# Patient Record
Sex: MALE | Race: BLACK OR AFRICAN AMERICAN | NOT HISPANIC OR LATINO | URBAN - METROPOLITAN AREA
[De-identification: names, ages, dates, MRNs, and addresses within clinical notes are randomized per-mention and may not be internally consistent; named-entity substitution may affect disease eponyms.]

---

## 2023-08-26 ENCOUNTER — EMERGENCY (EMERGENCY)
Facility: HOSPITAL | Age: 73
LOS: 0 days | Discharge: HOME | End: 2023-08-27
Attending: STUDENT IN AN ORGANIZED HEALTH CARE EDUCATION/TRAINING PROGRAM
Payer: MEDICARE

## 2023-08-26 VITALS
WEIGHT: 179.9 LBS | TEMPERATURE: 98 F | HEIGHT: 66 IN | OXYGEN SATURATION: 98 % | DIASTOLIC BLOOD PRESSURE: 89 MMHG | HEART RATE: 84 BPM | RESPIRATION RATE: 18 BRPM | SYSTOLIC BLOOD PRESSURE: 158 MMHG

## 2023-08-26 DIAGNOSIS — H53.8 OTHER VISUAL DISTURBANCES: ICD-10-CM

## 2023-08-26 DIAGNOSIS — R39.198 OTHER DIFFICULTIES WITH MICTURITION: ICD-10-CM

## 2023-08-26 DIAGNOSIS — N39.0 URINARY TRACT INFECTION, SITE NOT SPECIFIED: ICD-10-CM

## 2023-08-26 DIAGNOSIS — R10.9 UNSPECIFIED ABDOMINAL PAIN: ICD-10-CM

## 2023-08-26 LAB
ALBUMIN SERPL ELPH-MCNC: 3.3 G/DL — SIGNIFICANT CHANGE UP (ref 3.3–5)
ALP SERPL-CCNC: 60 U/L — SIGNIFICANT CHANGE UP (ref 40–120)
ALT FLD-CCNC: 34 U/L — SIGNIFICANT CHANGE UP (ref 12–78)
ANION GAP SERPL CALC-SCNC: 6 MMOL/L — SIGNIFICANT CHANGE UP (ref 5–17)
APPEARANCE UR: ABNORMAL
AST SERPL-CCNC: 39 U/L — HIGH (ref 15–37)
BACTERIA # UR AUTO: ABNORMAL
BASOPHILS # BLD AUTO: 0.06 K/UL — SIGNIFICANT CHANGE UP (ref 0–0.2)
BASOPHILS NFR BLD AUTO: 0.8 % — SIGNIFICANT CHANGE UP (ref 0–2)
BILIRUB SERPL-MCNC: 0.4 MG/DL — SIGNIFICANT CHANGE UP (ref 0.2–1.2)
BILIRUB UR-MCNC: NEGATIVE — SIGNIFICANT CHANGE UP
BUN SERPL-MCNC: 38 MG/DL — HIGH (ref 7–23)
CALCIUM SERPL-MCNC: 8.6 MG/DL — SIGNIFICANT CHANGE UP (ref 8.5–10.1)
CHLORIDE SERPL-SCNC: 105 MMOL/L — SIGNIFICANT CHANGE UP (ref 96–108)
CO2 SERPL-SCNC: 26 MMOL/L — SIGNIFICANT CHANGE UP (ref 22–31)
COLOR SPEC: YELLOW — SIGNIFICANT CHANGE UP
CREAT SERPL-MCNC: 1.88 MG/DL — HIGH (ref 0.5–1.3)
DIFF PNL FLD: ABNORMAL
EGFR: 37 ML/MIN/1.73M2 — LOW
EOSINOPHIL # BLD AUTO: 0.14 K/UL — SIGNIFICANT CHANGE UP (ref 0–0.5)
EOSINOPHIL NFR BLD AUTO: 1.8 % — SIGNIFICANT CHANGE UP (ref 0–6)
GLUCOSE SERPL-MCNC: 126 MG/DL — HIGH (ref 70–99)
GLUCOSE UR QL: NEGATIVE MG/DL — SIGNIFICANT CHANGE UP
HCT VFR BLD CALC: 42 % — SIGNIFICANT CHANGE UP (ref 39–50)
HGB BLD-MCNC: 14.5 G/DL — SIGNIFICANT CHANGE UP (ref 13–17)
IMM GRANULOCYTES NFR BLD AUTO: 0.4 % — SIGNIFICANT CHANGE UP (ref 0–0.9)
KETONES UR-MCNC: NEGATIVE — SIGNIFICANT CHANGE UP
LEUKOCYTE ESTERASE UR-ACNC: ABNORMAL
LYMPHOCYTES # BLD AUTO: 2.96 K/UL — SIGNIFICANT CHANGE UP (ref 1–3.3)
LYMPHOCYTES # BLD AUTO: 37.2 % — SIGNIFICANT CHANGE UP (ref 13–44)
MCHC RBC-ENTMCNC: 28.9 PG — SIGNIFICANT CHANGE UP (ref 27–34)
MCHC RBC-ENTMCNC: 34.5 G/DL — SIGNIFICANT CHANGE UP (ref 32–36)
MCV RBC AUTO: 83.7 FL — SIGNIFICANT CHANGE UP (ref 80–100)
MONOCYTES # BLD AUTO: 0.78 K/UL — SIGNIFICANT CHANGE UP (ref 0–0.9)
MONOCYTES NFR BLD AUTO: 9.8 % — SIGNIFICANT CHANGE UP (ref 2–14)
NEUTROPHILS # BLD AUTO: 3.99 K/UL — SIGNIFICANT CHANGE UP (ref 1.8–7.4)
NEUTROPHILS NFR BLD AUTO: 50 % — SIGNIFICANT CHANGE UP (ref 43–77)
NITRITE UR-MCNC: NEGATIVE — SIGNIFICANT CHANGE UP
NRBC # BLD: 0 /100 WBCS — SIGNIFICANT CHANGE UP (ref 0–0)
PH UR: 6 — SIGNIFICANT CHANGE UP (ref 5–8)
PLATELET # BLD AUTO: 292 K/UL — SIGNIFICANT CHANGE UP (ref 150–400)
POTASSIUM SERPL-MCNC: 5 MMOL/L — SIGNIFICANT CHANGE UP (ref 3.5–5.3)
POTASSIUM SERPL-SCNC: 5 MMOL/L — SIGNIFICANT CHANGE UP (ref 3.5–5.3)
PROT SERPL-MCNC: 8 GM/DL — SIGNIFICANT CHANGE UP (ref 6–8.3)
PROT UR-MCNC: 500 MG/DL
RBC # BLD: 5.02 M/UL — SIGNIFICANT CHANGE UP (ref 4.2–5.8)
RBC # FLD: 14.9 % — HIGH (ref 10.3–14.5)
RBC CASTS # UR COMP ASSIST: ABNORMAL /HPF (ref 0–4)
SODIUM SERPL-SCNC: 137 MMOL/L — SIGNIFICANT CHANGE UP (ref 135–145)
SP GR SPEC: 1.01 — SIGNIFICANT CHANGE UP (ref 1.01–1.02)
UROBILINOGEN FLD QL: NEGATIVE MG/DL — SIGNIFICANT CHANGE UP
WBC # BLD: 7.96 K/UL — SIGNIFICANT CHANGE UP (ref 3.8–10.5)
WBC # FLD AUTO: 7.96 K/UL — SIGNIFICANT CHANGE UP (ref 3.8–10.5)
WBC UR QL: ABNORMAL

## 2023-08-26 PROCEDURE — 99285 EMERGENCY DEPT VISIT HI MDM: CPT

## 2023-08-26 PROCEDURE — 99283 EMERGENCY DEPT VISIT LOW MDM: CPT | Mod: FS

## 2023-08-26 PROCEDURE — 74176 CT ABD & PELVIS W/O CONTRAST: CPT | Mod: 26,MA

## 2023-08-26 NOTE — ED ADULT NURSE NOTE - OBJECTIVE STATEMENT
Pt A&Ox4 presents to ED c/o L flank pain urinary frequency and dribbling x 3 months. As per pt he wants to have his prostate checked due to urinary symptoms. Pt denies hematuria, CP, SOB, numbness, tingling, N/V/D, f/c. PMH HTN. NKA.

## 2023-08-26 NOTE — ED PROVIDER NOTE - PROGRESS NOTE DETAILS
Shailesh: Labs and imaging reviewed with patient and family at bedside.  Patient wants to go home.  No recent bladder instrumentation.  Discussed with urology regarding air in bladder, not in bladder wall, patient very well appearing, would recommend abx and follow up outpatient monday in clinic.  Discussed this with patient and answered questions.  Will dc.  [id number]

## 2023-08-26 NOTE — ED PROVIDER NOTE - PATIENT PORTAL LINK FT
You can access the FollowMyHealth Patient Portal offered by [facility] by registering at the following website: [email or web address]. By joining [employer]’s FollowMyHealth portal, you will also be able to view your health information using other applications (apps) compatible with our system.

## 2023-08-26 NOTE — ED PROVIDER NOTE - PHYSICAL EXAMINATION
General appearance: Nontoxic appearing, conversant, afebrile    Eyes: anicteric sclerae, ELISA, EOMI   HENT: Atraumatic; oropharynx clear, MMM and no ulcerations, no pharyngeal erythema or exudate   Neck: Trachea midline; Full range of motion, supple   Pulm: CTA bl, normal respiratory effort and no intercostal retractions, normal work of breathing   CV: RRR, No murmurs, rubs, or gallops   Abdomen: Soft, non-tender, non-distended; no guarding or rebound   Back: No midline ttp, step offs, deformities, no cvat    Extremities: No peripheral edema, no gross deformities, FROM x4   Skin: Dry, normal temperature, turgor and texture; no rash   Psych: Appropriate affect, cooperative

## 2023-08-26 NOTE — ED PROVIDER NOTE - CARE PROVIDER_API CALL
Tom Patterson  Urology  [address], [address]  [place] [address]  Phone: [phone]  Fax: [phone]  Follow Up Time:

## 2023-08-26 NOTE — ED ADULT TRIAGE NOTE - CHIEF COMPLAINT QUOTE
pt presents to the ED c/o Left flank pain w/ dysuria and urinary frequency and dribbling x 3 months. pt states also wants to check prostate due to urinary symptoms

## 2023-08-26 NOTE — ED PROVIDER NOTE - TEMPLATE, MLM
Answers submitted by the patient for this visit:  Review of Systems Questionnaire (Submitted on 5/29/2023)  activity change: No  unexpected weight change: No  neck pain: Yes  hearing loss: No  rhinorrhea: No  trouble swallowing: No  eye discharge: No  visual disturbance: No  chest tightness: No  wheezing: No  chest pain: No  palpitations: No  blood in stool: No  constipation: No  vomiting: No  diarrhea: No  polydipsia: No  polyuria: No  difficulty urinating: No  hematuria: No  menstrual problem: Yes  dysuria: No  joint swelling: No  arthralgias: No  headaches: Yes  weakness: No  confusion: No  dysphoric mood: Yes     General

## 2023-08-26 NOTE — ED PROVIDER NOTE - CLINICAL SUMMARY MEDICAL DECISION MAKING FREE TEXT BOX
74yo male with no pmh presenting with L flank pain and difficulty urinating.  Progressive over months, recently more difficulty to urinate with pain so came to ED.  Concerned about prostate issues.  No fevers.  Has suprapubic pain with urination.  Has not taken anything for pain.  No pshx.  Also endorsing progressive blurry vision over months, no sudden change.  Wears glasses, no visual loss.  Will eval for retention, stone, uti.  Doubt gi, diverticulitis, colitis.  Vision does not seem acute.  Labs, urine, ct, anticipate dc with uro and ophthalmology follow up.

## 2023-08-26 NOTE — ED ADULT NURSE NOTE - NSFALLUNIVINTERV_ED_ALL_ED
Bed/Stretcher in lowest position, wheels locked, appropriate side rails in place/Call bell, personal items and telephone in reach/Instruct patient to call for assistance before getting out of bed/chair/stretcher/Non-slip footwear applied when patient is off stretcher/Cope to call system/Physically safe environment - no spills, clutter or unnecessary equipment/Purposeful proactive rounding/Room/bathroom lighting operational, light cord in reach

## 2023-08-27 VITALS
DIASTOLIC BLOOD PRESSURE: 90 MMHG | HEART RATE: 82 BPM | RESPIRATION RATE: 17 BRPM | TEMPERATURE: 98 F | OXYGEN SATURATION: 98 % | SYSTOLIC BLOOD PRESSURE: 160 MMHG

## 2023-08-27 RX ORDER — CEFPODOXIME PROXETIL 100 MG
1 TABLET ORAL
Qty: 20 | Refills: 0
Start: 2023-08-27 | End: 2023-09-05

## 2023-08-27 RX ORDER — CEFPODOXIME PROXETIL 100 MG
200 TABLET ORAL ONCE
Refills: 0 | Status: COMPLETED | OUTPATIENT
Start: 2023-08-27 | End: 2023-08-27

## 2023-08-27 RX ADMIN — Medication 200 MILLIGRAM(S): at 00:43

## 2023-08-27 NOTE — CONSULT NOTE ADULT - SUBJECTIVE AND OBJECTIVE BOX
Chief Complaint:  Patient is a 73y old  Male who presents with a chief complaint of urinary retention    HPI: Pt denies pertinent pmh is here for worsening urinary retention over the last 2 months as well as flank pain.  Denies any hematuria or dysuria.   Denies fevers, chills      PMH/PSH:PAST MEDICAL & SURGICAL HISTORY:      Allergies:  No Known Allergies      Medications:      REVIEW OF SYSTEMS:  All other review of systems is negative unless indicated above.    Relevant Family History:   FAMILY HISTORY:      Relevant Social History:  Denies ETOH or tobacco history    Physical Exam:    Vital Signs:  Vital Signs Last 24 Hrs  T(C): 36.4 (27 Aug 2023 01:55), Max: 36.9 (26 Aug 2023 18:43)  T(F): 97.5 (27 Aug 2023 01:55), Max: 98.5 (26 Aug 2023 18:43)  HR: 82 (27 Aug 2023 01:55) (82 - 84)  BP: 160/90 (27 Aug 2023 01:55) (158/89 - 172/100)  BP(mean): --  RR: 17 (27 Aug 2023 01:55) (17 - 18)  SpO2: 98% (27 Aug 2023 01:55) (98% - 99%)    Parameters below as of 27 Aug 2023 01:55  Patient On (Oxygen Delivery Method): room air      Daily Height in cm: 167.64 (26 Aug 2023 18:43)    Daily     Constitutional: WDWN resting comfortably in bed; NAD  Respiratory: Normal breathing  Cardiac: RRR  Gastrointestinal: soft, NT/ND; no rebound or guarding;   : mild suprapubic TTP  Extremities: , no clubbing or cyanosis; no peripheral edema  Musculoskeletal:  no joint swelling, tenderness or erythema  Skin: warm, dry and intact;  Neurologic: AAOx3;          Labratory                    14.5   7.96  )-----------( 292      ( 26 Aug 2023 21:12 )             42.0     08-    137  |  105  |  38<H>  ----------------------------<  126<H>  5.0   |  26  |  1.88<H>    Ca    8.6      26 Aug 2023 21:12    TPro  8.0  /  Alb  3.3  /  TBili  0.4  /  DBili  x   /  AST  39<H>  /  ALT  34  /  AlkPhos  60      LIVER FUNCTIONS - ( 26 Aug 2023 21:12 )  Alb: 3.3 g/dL / Pro: 8.0 gm/dL / ALK PHOS: 60 U/L / ALT: 34 U/L / AST: 39 U/L / GGT: x             Urinalysis Basic - ( 26 Aug 2023 21:12 )    Color: Yellow / Appearance: Slightly Turbid / S.010 / pH: x  Gluc: 126 mg/dL / Ketone: Negative  / Bili: Negative / Urobili: Negative mg/dL   Blood: x / Protein: 500 mg/dL / Nitrite: Negative   Leuk Esterase: Moderate / RBC: 3-5 /HPF / WBC 26-50   Sq Epi: x / Non Sq Epi: x / Bacteria: Many          Intake and Output      Imaging:      < from: CT Abdomen and Pelvis No Cont (23 @ 23:18) >    INTERPRETATION:  CLINICAL INFORMATION: Left flank pain. Difficulty   urinating.    COMPARISON: None.    CONTRAST/COMPLICATIONS:  IV Contrast: None  Oral Contrast: None  Complications: None    PROCEDURE:  CT of the Abdomen and Pelvis was performed.  Sagittal and coronal reformats were performed.    FINDINGS:  LOWER CHEST: Right gynecomastia.    LIVER: Within normal limits.  BILE DUCTS:Normal caliber.  GALLBLADDER: Contracted.  SPLEEN: Within normal limits.  PANCREAS: Within normal limits.  ADRENALS: Within normal limits.  KIDNEYS/URETERS: No hydronephrosis. Symmetric-appearing nonspecific   perinephric stranding. Staghorn type calculus lower pole left kidney with   additional nonobstructing left intrarenal calculi. No ureteral calculi.    BLADDER: Mildly distended and slightly thick-walled. Mild perivesicular   fat stranding suggested. Air within the bladder.  REPRODUCTIVE ORGANS: Enlarged prostate measuring up to 6 cm.    BOWEL: No bowel obstruction. Appendix is normal. Extensive colonic   diverticulosis. No evidence of diverticulitis.  PERITONEUM: No ascites. No free air or intraperitoneal collection.  VESSELS: Within normal limits.  RETROPERITONEUM/LYMPH NODES: No lymphadenopathy.  ABDOMINAL WALL: Tiny fat-containing umbilical hernia.  BONES: Mild degenerative changes.    IMPRESSION:  Air within the bladder; please correlate clinically for recent   instrumentation. Mild wall thickening and perivesicular fat stranding,   infectious process not excluded. No hydronephrosis. Nonobstructing left   intrarenal calculi. Please correlate clinically with urinalysis and urine   culture.  Enlarged prostate.    Diverticulosis without evidence of diverticulitis.    --- End of Report ---        < end of copied text >

## 2023-08-27 NOTE — CONSULT NOTE ADULT - ASSESSMENT
Pt is here with progressively worsening urinary retention.  UA shows UTI.  CT shows non obstructing intrarenal calculi, air in bladder.  Mild tenderness suprapubic exam.  VS wnl    recommend abx for UTI  pt should f/u outpatient with Dr Patterson this week  discussed with Dr Patterson

## 2023-08-29 ENCOUNTER — EMERGENCY (EMERGENCY)
Facility: HOSPITAL | Age: 73
LOS: 0 days | Discharge: HOME | End: 2023-08-30
Attending: EMERGENCY MEDICINE
Payer: MEDICARE

## 2023-08-29 VITALS
RESPIRATION RATE: 19 BRPM | SYSTOLIC BLOOD PRESSURE: 171 MMHG | HEIGHT: 66 IN | HEART RATE: 88 BPM | TEMPERATURE: 98 F | WEIGHT: 214.95 LBS | OXYGEN SATURATION: 98 % | DIASTOLIC BLOOD PRESSURE: 112 MMHG

## 2023-08-29 DIAGNOSIS — R79.9 ABNORMAL FINDING OF BLOOD CHEMISTRY, UNSPECIFIED: ICD-10-CM

## 2023-08-29 DIAGNOSIS — I10 ESSENTIAL (PRIMARY) HYPERTENSION: ICD-10-CM

## 2023-08-29 DIAGNOSIS — N39.0 URINARY TRACT INFECTION, SITE NOT SPECIFIED: ICD-10-CM

## 2023-08-29 LAB
-  AMIKACIN: SIGNIFICANT CHANGE UP
-  AMIKACIN: SIGNIFICANT CHANGE UP
-  AMOXICILLIN/CLAVULANIC ACID: SIGNIFICANT CHANGE UP
-  AMOXICILLIN/CLAVULANIC ACID: SIGNIFICANT CHANGE UP
-  AMPICILLIN/SULBACTAM: SIGNIFICANT CHANGE UP
-  AMPICILLIN/SULBACTAM: SIGNIFICANT CHANGE UP
-  AMPICILLIN: SIGNIFICANT CHANGE UP
-  AMPICILLIN: SIGNIFICANT CHANGE UP
-  AZTREONAM: SIGNIFICANT CHANGE UP
-  AZTREONAM: SIGNIFICANT CHANGE UP
-  CEFAZOLIN: SIGNIFICANT CHANGE UP
-  CEFAZOLIN: SIGNIFICANT CHANGE UP
-  CEFEPIME: SIGNIFICANT CHANGE UP
-  CEFEPIME: SIGNIFICANT CHANGE UP
-  CEFOXITIN: SIGNIFICANT CHANGE UP
-  CEFTRIAXONE: SIGNIFICANT CHANGE UP
-  CEFTRIAXONE: SIGNIFICANT CHANGE UP
-  CEFUROXIME: SIGNIFICANT CHANGE UP
-  CIPROFLOXACIN: SIGNIFICANT CHANGE UP
-  CIPROFLOXACIN: SIGNIFICANT CHANGE UP
-  ERTAPENEM: SIGNIFICANT CHANGE UP
-  ERTAPENEM: SIGNIFICANT CHANGE UP
-  GENTAMICIN: SIGNIFICANT CHANGE UP
-  GENTAMICIN: SIGNIFICANT CHANGE UP
-  IMIPENEM: SIGNIFICANT CHANGE UP
-  IMIPENEM: SIGNIFICANT CHANGE UP
-  LEVOFLOXACIN: SIGNIFICANT CHANGE UP
-  LEVOFLOXACIN: SIGNIFICANT CHANGE UP
-  MEROPENEM: SIGNIFICANT CHANGE UP
-  MEROPENEM: SIGNIFICANT CHANGE UP
-  NITROFURANTOIN: SIGNIFICANT CHANGE UP
-  NITROFURANTOIN: SIGNIFICANT CHANGE UP
-  PIPERACILLIN/TAZOBACTAM: SIGNIFICANT CHANGE UP
-  PIPERACILLIN/TAZOBACTAM: SIGNIFICANT CHANGE UP
-  TOBRAMYCIN: SIGNIFICANT CHANGE UP
-  TOBRAMYCIN: SIGNIFICANT CHANGE UP
-  TRIMETHOPRIM/SULFAMETHOXAZOLE: SIGNIFICANT CHANGE UP
-  TRIMETHOPRIM/SULFAMETHOXAZOLE: SIGNIFICANT CHANGE UP
CULTURE RESULTS: SIGNIFICANT CHANGE UP
METHOD TYPE: SIGNIFICANT CHANGE UP
METHOD TYPE: SIGNIFICANT CHANGE UP
ORGANISM # SPEC MICROSCOPIC CNT: SIGNIFICANT CHANGE UP
SPECIMEN SOURCE: SIGNIFICANT CHANGE UP

## 2023-08-29 PROCEDURE — 99283 EMERGENCY DEPT VISIT LOW MDM: CPT

## 2023-08-29 NOTE — ED ADULT TRIAGE NOTE - CHIEF COMPLAINT QUOTE
Called back for "something in urine"  seen at [facility] for urinary retention 8/26

## 2023-08-30 VITALS
RESPIRATION RATE: 18 BRPM | SYSTOLIC BLOOD PRESSURE: 183 MMHG | DIASTOLIC BLOOD PRESSURE: 124 MMHG | HEART RATE: 81 BPM | OXYGEN SATURATION: 98 % | TEMPERATURE: 98 F

## 2023-08-30 RX ADMIN — Medication 1 TABLET(S): at 02:45

## 2023-08-30 NOTE — ED ADULT NURSE REASSESSMENT NOTE - NS ED NURSE REASSESS COMMENT FT1
no s/s of distress noted in WR. Vitals completed. Explained wait time and ER triage. Verbalized understanding. Awaiting to be seen. no acute distress noted. Respirations even and unlabored. pt ambulatory with cane. pt accompanied by family. no s/s of distress noted and no changes in symptoms reported by pt  in WR. Vitals completed. Explained wait time and ER triage. Verbalized understanding. Awaiting to be seen. pt ambulatory with steady gait with use of cane. no acute distress noted. respirations even and unlabored.   pt accompanied by family.

## 2023-08-30 NOTE — ED PROVIDER NOTE - OBJECTIVE STATEMENT
72 yo M presents to ER after callback for positive urine culture.  Pt. denies complaints at this time.  States "something is wrong with the urine."   ROS: negative for fever, cough, headache, chest pain, shortness of breath, abd pain, nausea, vomiting, diarrhea, rash, paresthesia, and weakness--all other systems reviewed are negative.   PMH: negative; Meds: Denies; SH: Denies smoking/drinking/drug use

## 2023-08-30 NOTE — ED ADULT NURSE NOTE - OBJECTIVE STATEMENT
pt seen @ [facility] ED for urinary retention 8/26 and Called back today for "something in his urine." PM UTI

## 2023-08-30 NOTE — ED PROVIDER NOTE - PHYSICAL EXAMINATION
Vitals: HTN at 145/89, otherwise WNL  Gen: AAOx3, NAD, sitting comfortably in stretcher  Head: ncat, perrla, eomi b/l  Neck: supple, no lymphadenopathy, no midline deviation  Heart: rrr, no m/r/g  Lungs: CTA b/l, no rales/ronchi/wheezes  Abd: soft, nontender, non-distended, no rebound or guarding  Ext: no clubbing/cyanosis/edema  Neuro: sensation and muscle strength intact b/l, steady gait

## 2023-08-30 NOTE — ED ADULT NURSE NOTE - NURSING ED SKIN COLOR
Visit Information    Have you changed or started any medications since your last visit including any over-the-counter medicines, vitamins, or herbal medicines? no   Are you having any side effects from any of your medications? -  no  Have you stopped taking any of your medications? Is so, why? -  no    Have you seen any other physician or provider since your last visit? No  Have you had any other diagnostic tests since your last visit? No  Have you been seen in the emergency room and/or had an admission to a hospital since we last saw you? No  Have you had your routine dental cleaning in the past 6 months? no    Have you activated your [employer] account? If not, what are your barriers?  No:      Patient Care Team:  Ever Marina MD as PCP - General (Family Medicine)  Ever Marina MD as [facility] - [facility]  Marixa Bill MD as Consulting Physician (Internal Medicine)    Medical History Review  Past Medical, Family, and Social History reviewed and does contribute to the patient presenting condition    Health Maintenance   Topic Date Due    Hepatitis C screen  1958    HIV screen  05/01/1972    Shingles Vaccine (1 of 2) 05/01/2007    Low dose CT lung screening  05/01/2012    Cervical cancer screen  04/25/2017    Potassium monitoring  11/18/2017    Creatinine monitoring  04/11/2018    Flu vaccine (1) 09/01/2019    Colon Cancer Screen FIT/FOBT  12/04/2019    Lipid screen  01/30/2020    A1C test (Diabetic or Prediabetic)  06/12/2020    Breast cancer screen  12/04/2020    DTaP/Tdap/Td vaccine (2 - Td) 06/12/2029    Pneumococcal 0-64 years Vaccine  Completed     Low Dose CT (LDCT) Lung Screening criteria met   Age 50-69   Pack year smoking >30   Still smoking or less than 15 year since quit   No sign or symptoms of lung cancer   > 11 months since last LDCT     Risks and benefits of lung cancer screening with LDCT scans discussed:    Significance of positive screen - False-positive LDCT results normal for race

## 2023-08-30 NOTE — ED PROVIDER NOTE - CARE PROVIDER_API CALL
Tom Patterson  Urology  [address]  [place] [address]  Phone: [phone]  Fax: [phone]  Follow Up Time: Urgent

## 2023-08-30 NOTE — ED PROVIDER NOTE - PATIENT PORTAL LINK FT
You can access the FollowMyHealth Patient Portal offered by [facility] by registering at the following website: [email or web address]. By joining [employer]’s FollowMyHealth portal, you will also be able to view your health information using other applications (apps) compatible with our system.

## 2023-08-30 NOTE — ED ADULT NURSE NOTE - NSFALLHARMRISKINTERV_ED_ALL_ED

## 2023-09-05 ENCOUNTER — INPATIENT (INPATIENT)
Facility: HOSPITAL | Age: 73
LOS: 5 days | Discharge: HOME | End: 2023-09-11
Attending: INTERNAL MEDICINE | Admitting: INTERNAL MEDICINE
Payer: MEDICARE

## 2023-09-05 VITALS
HEIGHT: 66 IN | DIASTOLIC BLOOD PRESSURE: 102 MMHG | WEIGHT: 240.08 LBS | OXYGEN SATURATION: 97 % | HEART RATE: 85 BPM | TEMPERATURE: 98 F | SYSTOLIC BLOOD PRESSURE: 155 MMHG | RESPIRATION RATE: 18 BRPM

## 2023-09-05 DIAGNOSIS — N39.0 URINARY TRACT INFECTION, SITE NOT SPECIFIED: ICD-10-CM

## 2023-09-05 DIAGNOSIS — I12.9 HYPERTENSIVE CHRONIC KIDNEY DISEASE WITH STAGE 1 THROUGH STAGE 4 CHRONIC KIDNEY DISEASE, OR UNSPECIFIED CHRONIC KIDNEY DISEASE: ICD-10-CM

## 2023-09-05 DIAGNOSIS — B96.20 UNSPECIFIED ESCHERICHIA COLI [E. COLI] AS THE CAUSE OF DISEASES CLASSIFIED ELSEWHERE: ICD-10-CM

## 2023-09-05 DIAGNOSIS — N18.32 CHRONIC KIDNEY DISEASE, STAGE 3B: ICD-10-CM

## 2023-09-05 LAB
ALBUMIN SERPL ELPH-MCNC: 3.1 G/DL — LOW (ref 3.3–5)
ALP SERPL-CCNC: 48 U/L — SIGNIFICANT CHANGE UP (ref 40–120)
ALT FLD-CCNC: 23 U/L — SIGNIFICANT CHANGE UP (ref 12–78)
ANION GAP SERPL CALC-SCNC: 5 MMOL/L — SIGNIFICANT CHANGE UP (ref 5–17)
APPEARANCE UR: CLEAR — SIGNIFICANT CHANGE UP
AST SERPL-CCNC: 16 U/L — SIGNIFICANT CHANGE UP (ref 15–37)
BACTERIA # UR AUTO: ABNORMAL
BASOPHILS # BLD AUTO: 0.05 K/UL — SIGNIFICANT CHANGE UP (ref 0–0.2)
BASOPHILS NFR BLD AUTO: 0.5 % — SIGNIFICANT CHANGE UP (ref 0–2)
BILIRUB SERPL-MCNC: 0.5 MG/DL — SIGNIFICANT CHANGE UP (ref 0.2–1.2)
BILIRUB UR-MCNC: NEGATIVE — SIGNIFICANT CHANGE UP
BUN SERPL-MCNC: 28 MG/DL — HIGH (ref 7–23)
CALCIUM SERPL-MCNC: 8.9 MG/DL — SIGNIFICANT CHANGE UP (ref 8.5–10.1)
CHLORIDE SERPL-SCNC: 108 MMOL/L — SIGNIFICANT CHANGE UP (ref 96–108)
CO2 SERPL-SCNC: 20 MMOL/L — LOW (ref 22–31)
COLOR SPEC: YELLOW — SIGNIFICANT CHANGE UP
CREAT SERPL-MCNC: 2.41 MG/DL — HIGH (ref 0.5–1.3)
DIFF PNL FLD: ABNORMAL
EGFR: 27 ML/MIN/1.73M2 — LOW
EOSINOPHIL # BLD AUTO: 0.04 K/UL — SIGNIFICANT CHANGE UP (ref 0–0.5)
EOSINOPHIL NFR BLD AUTO: 0.4 % — SIGNIFICANT CHANGE UP (ref 0–6)
EPI CELLS # UR: SIGNIFICANT CHANGE UP
GLUCOSE SERPL-MCNC: 132 MG/DL — HIGH (ref 70–99)
GLUCOSE UR QL: NEGATIVE MG/DL — SIGNIFICANT CHANGE UP
HCT VFR BLD CALC: 41.6 % — SIGNIFICANT CHANGE UP (ref 39–50)
HGB BLD-MCNC: 14 G/DL — SIGNIFICANT CHANGE UP (ref 13–17)
IMM GRANULOCYTES NFR BLD AUTO: 0.3 % — SIGNIFICANT CHANGE UP (ref 0–0.9)
KETONES UR-MCNC: NEGATIVE — SIGNIFICANT CHANGE UP
LEUKOCYTE ESTERASE UR-ACNC: ABNORMAL
LYMPHOCYTES # BLD AUTO: 19.2 % — SIGNIFICANT CHANGE UP (ref 13–44)
LYMPHOCYTES # BLD AUTO: 2.12 K/UL — SIGNIFICANT CHANGE UP (ref 1–3.3)
MCHC RBC-ENTMCNC: 28.3 PG — SIGNIFICANT CHANGE UP (ref 27–34)
MCHC RBC-ENTMCNC: 33.7 G/DL — SIGNIFICANT CHANGE UP (ref 32–36)
MCV RBC AUTO: 84 FL — SIGNIFICANT CHANGE UP (ref 80–100)
MONOCYTES # BLD AUTO: 1.22 K/UL — HIGH (ref 0–0.9)
MONOCYTES NFR BLD AUTO: 11.1 % — SIGNIFICANT CHANGE UP (ref 2–14)
NEUTROPHILS # BLD AUTO: 7.57 K/UL — HIGH (ref 1.8–7.4)
NEUTROPHILS NFR BLD AUTO: 68.5 % — SIGNIFICANT CHANGE UP (ref 43–77)
NITRITE UR-MCNC: NEGATIVE — SIGNIFICANT CHANGE UP
NRBC # BLD: 0 /100 WBCS — SIGNIFICANT CHANGE UP (ref 0–0)
PH UR: 6 — SIGNIFICANT CHANGE UP (ref 5–8)
PLATELET # BLD AUTO: 265 K/UL — SIGNIFICANT CHANGE UP (ref 150–400)
POTASSIUM SERPL-MCNC: 4.5 MMOL/L — SIGNIFICANT CHANGE UP (ref 3.5–5.3)
POTASSIUM SERPL-SCNC: 4.5 MMOL/L — SIGNIFICANT CHANGE UP (ref 3.5–5.3)
PROT SERPL-MCNC: 8 GM/DL — SIGNIFICANT CHANGE UP (ref 6–8.3)
PROT UR-MCNC: 500 MG/DL
RBC # BLD: 4.95 M/UL — SIGNIFICANT CHANGE UP (ref 4.2–5.8)
RBC # FLD: 14.6 % — HIGH (ref 10.3–14.5)
RBC CASTS # UR COMP ASSIST: ABNORMAL /HPF (ref 0–4)
SODIUM SERPL-SCNC: 133 MMOL/L — LOW (ref 135–145)
SP GR SPEC: 1.01 — SIGNIFICANT CHANGE UP (ref 1.01–1.02)
TROPONIN I, HIGH SENSITIVITY RESULT: 11.3 NG/L — SIGNIFICANT CHANGE UP
UROBILINOGEN FLD QL: NEGATIVE MG/DL — SIGNIFICANT CHANGE UP
WBC # BLD: 11.03 K/UL — HIGH (ref 3.8–10.5)
WBC # FLD AUTO: 11.03 K/UL — HIGH (ref 3.8–10.5)
WBC UR QL: ABNORMAL

## 2023-09-05 PROCEDURE — 99285 EMERGENCY DEPT VISIT HI MDM: CPT

## 2023-09-05 PROCEDURE — G1004: CPT

## 2023-09-05 PROCEDURE — 99222 1ST HOSP IP/OBS MODERATE 55: CPT

## 2023-09-05 PROCEDURE — 99223 1ST HOSP IP/OBS HIGH 75: CPT

## 2023-09-05 PROCEDURE — 74176 CT ABD & PELVIS W/O CONTRAST: CPT | Mod: 26,ME

## 2023-09-05 PROCEDURE — 71045 X-RAY EXAM CHEST 1 VIEW: CPT | Mod: 26

## 2023-09-05 PROCEDURE — 99232 SBSQ HOSP IP/OBS MODERATE 35: CPT

## 2023-09-05 RX ORDER — HYDRALAZINE HCL 50 MG
5 TABLET ORAL ONCE
Refills: 0 | Status: COMPLETED | OUTPATIENT
Start: 2023-09-05 | End: 2023-09-05

## 2023-09-05 RX ORDER — MORPHINE SULFATE 50 MG/1
2 CAPSULE, EXTENDED RELEASE ORAL EVERY 4 HOURS
Refills: 0 | Status: DISCONTINUED | OUTPATIENT
Start: 2023-09-05 | End: 2023-09-08

## 2023-09-05 RX ORDER — PIPERACILLIN AND TAZOBACTAM 4; .5 G/20ML; G/20ML
3.38 INJECTION, POWDER, LYOPHILIZED, FOR SOLUTION INTRAVENOUS ONCE
Refills: 0 | Status: COMPLETED | OUTPATIENT
Start: 2023-09-05 | End: 2023-09-05

## 2023-09-05 RX ORDER — HEPARIN SODIUM 5000 [USP'U]/ML
5000 INJECTION INTRAVENOUS; SUBCUTANEOUS EVERY 8 HOURS
Refills: 0 | Status: DISCONTINUED | OUTPATIENT
Start: 2023-09-05 | End: 2023-09-06

## 2023-09-05 RX ORDER — ERTAPENEM SODIUM 1 G/1
500 INJECTION, POWDER, LYOPHILIZED, FOR SOLUTION INTRAMUSCULAR; INTRAVENOUS EVERY 24 HOURS
Refills: 0 | Status: DISCONTINUED | OUTPATIENT
Start: 2023-09-06 | End: 2023-09-06

## 2023-09-05 RX ORDER — SODIUM CHLORIDE 9 MG/ML
1000 INJECTION, SOLUTION INTRAVENOUS
Refills: 0 | Status: DISCONTINUED | OUTPATIENT
Start: 2023-09-05 | End: 2023-09-07

## 2023-09-05 RX ORDER — LANOLIN ALCOHOL/MO/W.PET/CERES
3 CREAM (GRAM) TOPICAL AT BEDTIME
Refills: 0 | Status: DISCONTINUED | OUTPATIENT
Start: 2023-09-05 | End: 2023-09-08

## 2023-09-05 RX ORDER — ONDANSETRON 8 MG/1
4 TABLET, FILM COATED ORAL EVERY 8 HOURS
Refills: 0 | Status: DISCONTINUED | OUTPATIENT
Start: 2023-09-05 | End: 2023-09-08

## 2023-09-05 RX ORDER — ERTAPENEM SODIUM 1 G/1
INJECTION, POWDER, LYOPHILIZED, FOR SOLUTION INTRAMUSCULAR; INTRAVENOUS
Refills: 0 | Status: DISCONTINUED | OUTPATIENT
Start: 2023-09-05 | End: 2023-09-06

## 2023-09-05 RX ORDER — ERTAPENEM SODIUM 1 G/1
500 INJECTION, POWDER, LYOPHILIZED, FOR SOLUTION INTRAMUSCULAR; INTRAVENOUS ONCE
Refills: 0 | Status: COMPLETED | OUTPATIENT
Start: 2023-09-05 | End: 2023-09-05

## 2023-09-05 RX ORDER — SODIUM CHLORIDE 9 MG/ML
1000 INJECTION INTRAMUSCULAR; INTRAVENOUS; SUBCUTANEOUS ONCE
Refills: 0 | Status: COMPLETED | OUTPATIENT
Start: 2023-09-05 | End: 2023-09-05

## 2023-09-05 RX ORDER — PIPERACILLIN AND TAZOBACTAM 4; .5 G/20ML; G/20ML
3.38 INJECTION, POWDER, LYOPHILIZED, FOR SOLUTION INTRAVENOUS EVERY 8 HOURS
Refills: 0 | Status: DISCONTINUED | OUTPATIENT
Start: 2023-09-05 | End: 2023-09-05

## 2023-09-05 RX ORDER — PIPERACILLIN AND TAZOBACTAM 4; .5 G/20ML; G/20ML
1500 INJECTION, POWDER, LYOPHILIZED, FOR SOLUTION INTRAVENOUS EVERY 8 HOURS
Refills: 0 | Status: DISCONTINUED | OUTPATIENT
Start: 2023-09-05 | End: 2023-09-05

## 2023-09-05 RX ORDER — INFLUENZA VIRUS VACCINE 15; 15; 15; 15 UG/.5ML; UG/.5ML; UG/.5ML; UG/.5ML
0.7 SUSPENSION INTRAMUSCULAR ONCE
Refills: 0 | Status: DISCONTINUED | OUTPATIENT
Start: 2023-09-05 | End: 2023-09-05

## 2023-09-05 RX ORDER — KETOROLAC TROMETHAMINE 30 MG/ML
30 SYRINGE (ML) INJECTION ONCE
Refills: 0 | Status: DISCONTINUED | OUTPATIENT
Start: 2023-09-05 | End: 2023-09-05

## 2023-09-05 RX ORDER — ACETAMINOPHEN 500 MG
650 TABLET ORAL EVERY 6 HOURS
Refills: 0 | Status: DISCONTINUED | OUTPATIENT
Start: 2023-09-05 | End: 2023-09-08

## 2023-09-05 RX ADMIN — MORPHINE SULFATE 2 MILLIGRAM(S): 50 CAPSULE, EXTENDED RELEASE ORAL at 05:07

## 2023-09-05 RX ADMIN — SODIUM CHLORIDE 125 MILLILITER(S): 9 INJECTION, SOLUTION INTRAVENOUS at 21:49

## 2023-09-05 RX ADMIN — HEPARIN SODIUM 5000 UNIT(S): 5000 INJECTION INTRAVENOUS; SUBCUTANEOUS at 04:44

## 2023-09-05 RX ADMIN — SODIUM CHLORIDE 125 MILLILITER(S): 9 INJECTION, SOLUTION INTRAVENOUS at 04:44

## 2023-09-05 RX ADMIN — Medication 30 MILLIGRAM(S): at 02:02

## 2023-09-05 RX ADMIN — SODIUM CHLORIDE 125 MILLILITER(S): 9 INJECTION, SOLUTION INTRAVENOUS at 17:28

## 2023-09-05 RX ADMIN — ERTAPENEM SODIUM 500 MILLIGRAM(S): 1 INJECTION, POWDER, LYOPHILIZED, FOR SOLUTION INTRAMUSCULAR; INTRAVENOUS at 16:02

## 2023-09-05 RX ADMIN — SODIUM CHLORIDE 1000 MILLILITER(S): 9 INJECTION INTRAMUSCULAR; INTRAVENOUS; SUBCUTANEOUS at 01:31

## 2023-09-05 RX ADMIN — Medication 650 MILLIGRAM(S): at 17:28

## 2023-09-05 RX ADMIN — PIPERACILLIN AND TAZOBACTAM 200 GRAM(S): 4; .5 INJECTION, POWDER, LYOPHILIZED, FOR SOLUTION INTRAVENOUS at 03:51

## 2023-09-05 RX ADMIN — Medication 5 MILLIGRAM(S): at 17:28

## 2023-09-05 RX ADMIN — Medication 30 MILLIGRAM(S): at 01:32

## 2023-09-05 RX ADMIN — HEPARIN SODIUM 5000 UNIT(S): 5000 INJECTION INTRAVENOUS; SUBCUTANEOUS at 15:55

## 2023-09-05 RX ADMIN — Medication 650 MILLIGRAM(S): at 18:20

## 2023-09-05 RX ADMIN — HEPARIN SODIUM 5000 UNIT(S): 5000 INJECTION INTRAVENOUS; SUBCUTANEOUS at 21:49

## 2023-09-05 RX ADMIN — Medication 20 MILLIGRAM(S): at 05:45

## 2023-09-05 RX ADMIN — PIPERACILLIN AND TAZOBACTAM 25 GRAM(S): 4; .5 INJECTION, POWDER, LYOPHILIZED, FOR SOLUTION INTRAVENOUS at 09:55

## 2023-09-05 NOTE — CONSULT NOTE ADULT - NS ATTEND AMEND GEN_ALL_CORE FT
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 was on bactrim out pt w chills, despite being sensitive to it.  Grew Enterobacter and ESBL E Coli a wk before. Now on Zosyn  Will need mid line and ID consult: they changed him to Ertapenem  Has appt w Dr. Patterson on 9/10  Needs PCNL  pt understands  f/u cx    This stay has had no temps, but did have n/v yesterday.  Pt understands importance of prompt follow up for surgical booking.

## 2023-09-05 NOTE — ED PROVIDER NOTE - PHYSICAL EXAMINATION
General: Well appearing male in no acute distress  HEENT: Normocephalic, atraumatic. Moist mucous membranes. Oropharynx clear. No lymphadenopathy.  Eyes: No scleral icterus. EOMI. EMIGDIO.  Neck:. Soft and supple. Full ROM without pain. No midline tenderness  Cardiac: Regular rate and regular rhythm. No murmurs, rubs, gallops. Peripheral pulses 2+ and symmetric. No LE edema.  Resp: Lungs CTAB. Speaking in full sentences. No wheezes, rales or rhonchi.  Abd: Soft, non-tender, non-distended. No guarding or rebound. No scars, masses, or lesions.  Back: Spine midline and non-tender. No CVA tenderness.    Skin: No rashes, abrasions, or lacerations.  Neuro: AO x 3. Moves all extremities symmetrically. Motor strength and sensation grossly intact.

## 2023-09-05 NOTE — CHART NOTE - NSCHARTNOTEFT_GEN_A_CORE
seen and examined  urology c/s placed  cw abx, ivf  Sujit    Vital Signs Last 24 Hrs  T(C): 37.2 (05 Sep 2023 11:), Max: 37.2 (05 Sep 2023 11:)  T(F): 98.9 (05 Sep 2023 11:), Max: 98.9 (05 Sep 2023 11:)  HR: 72 (05 Sep 2023 11:) (69 - 85)  BP: 156/82 (05 Sep 2023 11:22) (152/80 - 180/79)  BP(mean): --  RR: 17 (05 Sep 2023 11:) (16 - 18)  SpO2: 98% (05 Sep 2023 11:22) (97% - 100%)    Parameters below as of 05 Sep 2023 11:22  Patient On (Oxygen Delivery Method): room air                          14.0   11.03 )-----------( 265      ( 05 Sep 2023 01:30 )             41.6     09-05    133<L>  |  108  |  28<H>  ----------------------------<  132<H>  4.5   |  20<L>  |  2.41<H>    Ca    8.9      05 Sep 2023 01:30    TPro  8.0  /  Alb  3.1<L>  /  TBili  0.5  /  DBili  x   /  AST  16  /  ALT  23  /  AlkPhos  48  09-05      Urinalysis Basic - ( 05 Sep 2023 01:30 )    Color: Yellow / Appearance: Clear / S.015 / pH: x  Gluc: 132 mg/dL / Ketone: Negative  / Bili: Negative / Urobili: Negative mg/dL   Blood: x / Protein: 500 mg/dL / Nitrite: Negative   Leuk Esterase: Small / RBC: 3-5 /HPF / WBC 11-25   Sq Epi: x / Non Sq Epi: x / Bacteria: Few

## 2023-09-05 NOTE — ED PROVIDER NOTE - CLINICAL SUMMARY MEDICAL DECISION MAKING FREE TEXT BOX
74 y/o M presents w/ L flank pain for the past few days, worse over the past 1 day. endorsing having chest pain earlier but no chest pain now. states he was unable to sleep due to the sharp pain. has been taking bactrim at home for UTI. states he was not discharged home w/ any pain medications. has not had a chance ot see urologist yet outpatient but was seen by urology during one of his prior ER visits, has appointment on 9/10/23. endorsing BPH, able to urinate, denies any dysuria.   well appearing, resting comfortably in the bed. likely pain from known kidney stone. will check UA/UC to see if UTI is improving. CT, labs , fluids and pain control. wife at bedside who speaks english, creole  was also used - no ID# was provided on the screen. 72 y/o M presents w/ L flank pain for the past few days, worse over the past 1 day. endorsing having chest pain earlier but no chest pain now. states he was unable to sleep due to the sharp pain. has been taking bactrim at home for UTI. states he was not discharged home w/ any pain medications. has not had a chance ot see urologist yet outpatient but was seen by urology during one of his prior ER visits, has appointment on 9/10/23. endorsing BPH, able to urinate, denies any dysuria.   well appearing, resting comfortably in the bed. likely pain from known kidney stone. will check UA/UC to see if UTI is improving. CT, labs , fluids and pain control. wife at bedside who speaks english, creole  was also used - no ID# was provided on the screen.    admit to dr. murphy, patient sleeping w/ pain controlled.

## 2023-09-05 NOTE — CONSULT NOTE ADULT - SUBJECTIVE AND OBJECTIVE BOX
Patient seen and examined at bedside with Dr. Lucas. Creole  [id number] Anita manny. Patient is a 73M presented 1 week ago to hospital with fevers, and back pain, noted to have left staghorn calculus. Completed antibiotic course of Bactrim, however continues to complain of pain.    HPI:  73M w/ no significant PMH p/w L flank pain. Symptoms began one day prior to presentation and has progressively worsened. Of note, patient was recently seen for urinary retention. Pt was seen by urology and scheduled for follow up appointment 9/10/23. Pt was called for MDR E. Coli UTI. Patient was started on bactrim which was completed. Denies fever/chills/sweats, nausea/vomiting, abdominal pain, constipation.    In ED, patient afebrile and HDS. Labs notable for WBC 11, SCr 2.41, UA+, CT w/ non-obstructive L kidney stones (large non-obstructing staghorn). Pt was given IVF and zosyn. Urology was consulted. Pt admitted to hospitalist service for continued management. (05 Sep 2023 04:30)      PMH/PSH:PAST MEDICAL & SURGICAL HISTORY:      Allergies:  No Known Allergies      Medications:  acetaminophen     Tablet .. 650 milliGRAM(s) Oral every 6 hours PRN  aluminum hydroxide/magnesium hydroxide/simethicone Suspension 30 milliLiter(s) Oral every 4 hours PRN  enalapril 20 milliGRAM(s) Oral daily  ertapenem  IVPB      heparin   Injectable 5000 Unit(s) IV Push every 8 hours  lactated ringers. 1000 milliLiter(s) IV Continuous <Continuous>  melatonin 3 milliGRAM(s) Oral at bedtime PRN  morphine  - Injectable 2 milliGRAM(s) IV Push every 4 hours PRN  ondansetron Injectable 4 milliGRAM(s) IV Push every 8 hours PRN      REVIEW OF SYSTEMS:  All other review of systems is negative unless indicated above.    Relevant Family History:   FAMILY HISTORY:      Relevant Social History:  Denies ETOH or tobacco history    Physical Exam:    Vital Signs:  Vital Signs Last 24 Hrs  T(C): 36.3 (05 Sep 2023 16:49), Max: 37.2 (05 Sep 2023 11:22)  T(F): 97.4 (05 Sep 2023 16:49), Max: 98.9 (05 Sep 2023 11:22)  HR: 80 (05 Sep 2023 16:49) (69 - 85)  BP: 179/92 (05 Sep 2023 16:49) (152/80 - 180/79)  RR: 17 (05 Sep 2023 16:49) (16 - 18)  SpO2: 98% (05 Sep 2023 16:49) (97% - 100%)    Parameters below as of 05 Sep 2023 16:49  Patient On (Oxygen Delivery Method): room air      Daily Height in cm: 167.64 (05 Sep 2023 00:39)    Daily     Constitutional: NAD  HEENT: NC/AT, PERRL, EOMI, anicteric sclera, no nasal discharge  Neck: supple; no JVD or thyromegaly  Respiratory: Good inspiratory effort, no respiratory distress  Cardiac: +S1/S2  Gastrointestinal: suprapubic tenderness  Genitourinary: suprapubic tenderness to palpation  Extremities: No clubbing or cyanosis; no peripheral edema  Musculoskeletal:  no joint swelling, tenderness or erythema  Vascular: 2+ radial, femoral, DP/PT pulses B/L  Skin: warm, dry and intact; no rashes, wounds, or scars  Neurologic: AAOx3    Laboratory:                          14.0   11.03 )-----------( 265      ( 05 Sep 2023 01:30 )             41.6     09-05    133<L>  |  108  |  28<H>  ----------------------------<  132<H>  4.5   |  20<L>  |  2.41<H>    Ca    8.9      05 Sep 2023 01:30    TPro  8.0  /  Alb  3.1<L>  /  TBili  0.5  /  DBili  x   /  AST  16  /  ALT  23  /  AlkPhos  48  09-05    LIVER FUNCTIONS - ( 05 Sep 2023 01:30 )  Alb: 3.1 g/dL / Pro: 8.0 gm/dL / ALK PHOS: 48 U/L / ALT: 23 U/L / AST: 16 U/L / GGT: x             Urinalysis Basic - ( 05 Sep 2023 01:30 )    Color: Yellow / Appearance: Clear / S.015 / pH: x  Gluc: 132 mg/dL / Ketone: Negative  / Bili: Negative / Urobili: Negative mg/dL   Blood: x / Protein: 500 mg/dL / Nitrite: Negative   Leuk Esterase: Small / RBC: 3-5 /HPF / WBC 11-25   Sq Epi: x / Non Sq Epi: x / Bacteria: Few          Imaging:    < from: CT Renal Stone Hunt (23 @ 02:16) >    ACC: [id number] EXAM:  CT RENAL STONE HUNT   ORDERED BY: CHANDANA HEARN     PROCEDURE DATE:  2023          INTERPRETATION:  CLINICAL INFORMATION: Abdominal and flank pain.    COMPARISON: 2023.    CONTRAST/COMPLICATIONS:  IV Contrast: None.  Oral Contrast: None.  Complications: None.    PROCEDURE:  CT of the Abdomen and Pelvis was performed.  Sagittal and coronal reformats were performed.    FINDINGS:  LOWER CHEST: Mild bibasilar dependent atelectasis and trace effusions.   Heart is enlarged. Coronary artery atherosclerotic calcifications.   Symmetric bilateral gynecomastia.    LIVER: Within normal limits.  BILE DUCTS: Normal caliber.  GALLBLADDER: Within normal limits.  SPLEEN: Within normal limits.  PANCREAS: Within normal limits.  ADRENALS: Within normal limits.  KIDNEYS/URETERS: No right or left hydroureteronephrosis or obstructing   urinary tract calculus. Large staghorn type nonobstructing left   intrarenal calculi and a few additional punctate nonobstructing calculi.   No asymmetric perinephric stranding.    BLADDER: Air within the urinary bladder which may be from recent   instrumentation.  REPRODUCTIVE ORGANS: Prostate gland is enlarged.    BOWEL: Small hiatus hernia. No bowel obstruction. Pancolonic   diverticulosis without evidence of diverticulitis. Normal appendix.  PERITONEUM: No ascites or pneumoperitoneum. No mesenteric lymphadenopathy.  VESSELS: Atherosclerotic calcifications of the aortoiliac tree. Normal   caliber abdominal aorta.  RETROPERITONEUM/LYMPH NODES: No lymphadenopathy.  ABDOMINAL WALL: Small fat-containing umbilical hernia.  BONES: Mild degenerative changes of the spine.    IMPRESSION:  No right or left hydroureteronephrosis or obstructing urinary tract   calculus. Large staghorn type nonobstructing left intrarenal calculi and   a few additional punctate nonobstructing calculi.    Air within the urinary bladder which may be from recent instrumentation?   Enlarged prostate gland.    Pancolonic diverticulosis without evidence of diverticulitis.

## 2023-09-05 NOTE — CONSULT NOTE ADULT - ASSESSMENT
73M with staghorn type nonobstructing left intrarenal calculi, with ESBL E. Coli, and enterobacter cloace. VSS. afebrile    Continue antibiotics per ID  Plan for outpatient procedure to remove stone  Trend creatinine.  Discussed and seen with Dr. Lucas  
73M  p/w L flank pain. Symptoms began one day prior to presentation and has progressively worsened. Of note, patient was recently seen for urinary retention. Pt was seen by urology and scheduled for follow up appointment 9/10/23.   Pt was called for ESBL E. Coli UTI. Patient was started on bactrim which was completed. Denies fever/chills/sweats, nausea/vomiting, abdominal pain, constipation.  afebrile  vital signs stable  UA positive  urine culture from 8/26 with ESBL ecoli and e.cloacae-no oral antibiotics options available   patient needs urologic procedure as well as has some symptoms inclduing flank pain   also has new ED    plan:  stop Zosyn  start renally dosed Ertapenem 500mg every 24hrs   place midline and arrange for home antibiotics  duration TBD depending on how long it will take to g  pain control  send HIV screen     Discussed with Shayy Jacobs, DO  Infectious Disease Attending  Reachable via Microsoft Teams or ID office: [phone]  After 5pm/weekends please call [phone] for all inquiries and new consults

## 2023-09-05 NOTE — ED PROVIDER NOTE - PROGRESS NOTE DETAILS
urology consulted for kidney stone , ED , improving UTI. stone is non-obstructing. spoke to uro PA, states attending will be made aware in the morning. evaluation pending.

## 2023-09-05 NOTE — ED ADULT NURSE NOTE - NSFALLUNIVINTERV_ED_ALL_ED
Bed/Stretcher in lowest position, wheels locked, appropriate side rails in place/Call bell, personal items and telephone in reach/Instruct patient to call for assistance before getting out of bed/chair/stretcher/Non-slip footwear applied when patient is off stretcher/[place] to call system/Physically safe environment - no spills, clutter or unnecessary equipment/Purposeful proactive rounding/Room/bathroom lighting operational, light cord in reach

## 2023-09-05 NOTE — CONSULT NOTE ADULT - SUBJECTIVE AND OBJECTIVE BOX
[facility]  INFECTIOUS DISEASES   [address] [place] [address]  Tel: [phone]     Fax: [phone]  ======================================================  Bishop MD Jonathan Garellek, DO Mary Porter, RUTHY   ======================================================    PLACIDE, CLAUDE  MRN-[id number]  Male  73y ([birth year])        Patient is a 73y old  Male who presents with a chief complaint of     HPI:  73M w/ no significant PMH p/w L flank pain. Symptoms began one day prior to presentation and has progressively worsened. Of note, patient was recently seen for urinary retention. Pt was seen by urology and scheduled for follow up appointment 9/10/23. Pt was called for ESBL E. Coli UTI. Patient was started on bactrim which was completed. Denies fever/chills/sweats, nausea/vomiting, abdominal pain, constipation.    In ED, patient afebrile and HDS. Labs notable for WBC 11, SCr 2.41, UA+, CT w/ non-obstructive L kidney stones (large non-obstructing staghorn). Pt was given IVF and zosyn. Urology was consulted. Pt admitted to hospitalist service for continued management. (05 Sep 2023 04:30)      ID consulted for workup and antibiotic management     PAST MEDICAL & SURGICAL HISTORY:      Allergies  No Known Allergies        ANTIMICROBIALS:  ertapenem  IVPB        MEDICATIONS  (STANDING):  ertapenem  IVPB   500 milliGRAM(s) IV Intermittent (09-05-23 @ 16:02)    piperacillin/tazobactam IVPB..   25 mL/Hr IV Intermittent (09-05-23 @ 09:55)    piperacillin/tazobactam IVPB...   200 mL/Hr IV Intermittent (09-05-23 @ 03:51)        OTHER MEDS: MEDICATIONS  (STANDING):  acetaminophen     Tablet .. 650 every 6 hours PRN  aluminum hydroxide/magnesium hydroxide/simethicone Suspension 30 every 4 hours PRN  enalapril 20 daily  heparin   Injectable 5000 every 8 hours  melatonin 3 at bedtime PRN  morphine  - Injectable 2 every 4 hours PRN  ondansetron Injectable 4 every 8 hours PRN      SOCIAL HISTORY:     denies toxic habits     FAMILY HISTORY:      REVIEW OF SYSTEMS  [  ] ROS unobtainable because:    [ X ] All other systems negative except as noted below:	    Constitutional:  [ ] fever [ ] chills  [ ] weight loss  [ ] weakness  Skin:  [ ] rash [ ] phlebitis	  Eyes: [ ] icterus [ ] pain  [ ] discharge	  ENMT: [ ] sore throat  [ ] thrush [ ] ulcers [ ] exudates  Respiratory: [ ] dyspnea [ ] hemoptysis [ ] cough [ ] sputum	  Cardiovascular:  [ ] chest pain [ ] palpitations [ ] edema	  Gastrointestinal:  [ ] nausea [ ] vomiting [ ] diarrhea [ ] constipation [ ] pain	  Genitourinary:  [ ] dysuria [ ] frequency [ ] hematuria [ ] discharge [ X] flank pain  [ ] incontinence  Musculoskeletal:  [ ] myalgias [ ] arthralgias [ ] arthritis  [ ] back pain  Neurological:  [ ] headache [ ] seizures  [ ] confusion/altered mental status  Psychiatric:  [ ] anxiety [ ] depression	  Hematology/Lymphatics:  [ ] lymphadenopathy  Endocrine:  [ ] adrenal [ ] thyroid  Allergic/Immunologic:	 [ ] transplant [ ] seasonal    Vital Signs Last 24 Hrs  T(F): 97.4 (09-05-23 @ 16:49), Max: 98.9 (09-05-23 @ 11:22)    Vital Signs Last 24 Hrs  HR: 80 (09-05-23 @ 17:59) (69 - 85)  BP: 148/78 (09-05-23 @ 17:59) (148/78 - 180/79)  RR: 17 (09-05-23 @ 16:49)  SpO2: 98% (09-05-23 @ 16:49) (97% - 100%)  Wt(kg): --    PHYSICAL EXAM:  Constitutional: non-toxic, no distress  HEAD/EYES: anicteric, no conjunctival injection  ENT:  supple, no thrush  Cardiovascular:   normal S1, S2, no murmur, no edema  Respiratory:  clear BS bilaterally, no wheezes, no rales  GI:  soft, tender on left side, normal bowel sounds  :  no ocampo, +CVA tenderness  Musculoskeletal:  no synovitis, normal ROM  Neurologic: awake and alert, normal strength, no focal findings  Skin:  no rash, no erythema, no phlebitis  Heme/Onc: no lymphadenopathy   Psychiatric:  awake, alert, appropriate mood          WBC Count: 11.03 K/uL (09-05 @ 01:30)      Auto Neutrophil %: 68.5 % (09-05-23 @ 01:30)  Auto Neutrophil #: 7.57 K/uL *H* (09-05-23 @ 01:30)                            14.0   11.03 )-----------( 265      ( 05 Sep 2023 01:30 )             41.6       09-05    133<L>  |  108  |  28<H>  ----------------------------<  132<H>  4.5   |  20<L>  |  2.41<H>    Ca    8.9      05 Sep 2023 01:30    TPro  8.0  /  Alb  3.1<L>  /  TBili  0.5  /  DBili  x   /  AST  16  /  ALT  23  /  AlkPhos  48  09-05      Creatinine Trend: 2.41<--, 1.88<--        MICROBIOLOGY:  Clean Catch Clean Catch (Midstream)  08-26-23   >100,000 CFU/ml Escherichia coli ESBL  >100,000 CFU/ml Enterobacter cloacae complex  --  Escherichia coli ESBL  Enterobacter cloacae complex        RADIOLOGY:  < from: CT Renal Stone Hunt (09.05.23 @ 02:16) >  IMPRESSION:  No right or left hydroureteronephrosis or obstructing urinary tract   calculus. Large staghorn type nonobstructing left intrarenal calculi and   a few additional punctate nonobstructing calculi.    Air within the urinary bladder which may be from recent instrumentation?   Enlarged prostate gland.    Pancolonic diverticulosis without evidence of diverticulitis.    < end of copied text >      ACC: [id number] EXAM:  XR CHEST PORTABLE URGENT 1V   ORDERED BY: CHANDANA HEARN     PROCEDURE DATE:  09/05/2023          INTERPRETATION:  CLINICAL STATEMENT: Chest pain.    TECHNIQUE: AP view of the chest.    COMPARISON: None available.    FINDINGS/  IMPRESSION:  Questionable small left pleural effusion versus mild airspace opacity..   No gross consolidation    Heart size cannot be accurately assessed in this projection, but appear   enlarged.    PORSCHE ERWIN MD; Attending Radiologist  This document has been electronically signed. Sep  5 2023  9:35AM      I have personally reviewed the above imaging

## 2023-09-05 NOTE — H&P ADULT - HISTORY OF PRESENT ILLNESS
73M w/ no significant PMH p/w L flank pain. Symptoms began one day prior to presentation and has progressively worsened. Of note, patient was recently seen for urinary retention. Pt was seen by urology and scheduled for follow up appointment 9/10/23. Pt was called for MDR E. Coli UTI. Patient was started on bactrim which was completed. Denies fever/chills/sweats, nausea/vomiting, abdominal pain, constipation.    In ED, patient afebrile and HDS. Labs notable for WBC 11, SCr 2.41, UA+, CT w/ non-obstructive L kidney stones (large non-obstructing staghorn). Pt was given IVF and zosyn. Urology was consulted. Pt admitted to hospitalist service for continued management.

## 2023-09-05 NOTE — ED ADULT NURSE NOTE - OBJECTIVE STATEMENT
Audio  used for Nigerian Creole translation. Patient is AAOx4.  73 year old male presents to ED with complaint of 10/10 left sided flank pain that became worse yesterday and has been having trouble sleeping due to pain. Pt denies chest pain at this time. Pt denies abdominal pain, urethral discharge, pelvic pain, dysuria, hematuria, polyuria, urinary frequency, urinary urgency, cloudy urine, foul-smelling urine, fever, chills, nausea, vomiting, abdominal distension. Respirations equal and unlabored, no acute distress noted at this time. Patient placed on cardiac and SpO2 monitor at bedside. Dr Vance at bedside to evaluate, CVA tenderness noted. Was seen in ED several times over the last few weeks for same issue, had UTI last time and also last CT showed kidney stone. Pt states he finished his antibiotics from UTI he was dx'd with last week. Patient also has history of BPH and states urine is "not a good stream".

## 2023-09-05 NOTE — PATIENT PROFILE ADULT - FALL HARM RISK - HARM RISK INTERVENTIONS

## 2023-09-05 NOTE — ED PROVIDER NOTE - OBJECTIVE STATEMENT
72 y/o M presents w/ L flank pain for the past few days, worse over the past 1 day. endorsing having chest pain earlier but no chest pain now. states he was unable to sleep due to the sharp pain. has been taking bactrim at home for UTI. states he was not discharged home w/ any pain medications. has not had a chance ot see urologist yet outpatient but was seen by urology during one of his prior ER visits, has appointment on 9/10/23. endorsing BPH, able to urinate, denies any dysuria. denies fever/chills. denies shortness of breath. endorsing prior knee surgery in yaneth but no abdominal surgeries.

## 2023-09-05 NOTE — H&P ADULT - ASSESSMENT
73M w/ no significant PMH p/w L flank pain. Found to have L non-obstructing nephrolithiasis, worsening ED, and +UA    #Non-obstructing nephrolithiasis  #UTI  - IV LR  - Pain control prn  - discontinue bactrim; start zosyn  - follow up urology    #ED  - likely 2/2 bactrim  - discontinue bactrim; start zosyn  - IV LR  - renally adjust meds  - avoid nephrotoxic agents    #Hyponatremia  - IV LR  - monitor BMP    #FEN/PPX  - regular diet  - SQH for DVT ppx

## 2023-09-05 NOTE — PATIENT PROFILE ADULT - FOOD INSECURITY - DETAILS
Pt states that he lives in a cramped 1 bedroom apartment with one roommate and he has trouble paying for rent and food, and often skips meals

## 2023-09-05 NOTE — H&P ADULT - NSHPPHYSICALEXAM_GEN_ALL_CORE
Gen: No acute distress  HEENT: PERRL. EOMI. No appreciable lymphadenopathy  CV: Normal s1/s2. No murmurs, rubs, or gallops  Pulm: CTAB  GI: Soft, NT/ND  Ext: No LE edema. WWP  Neuro: AOx3. No gross motor or sensory deficits

## 2023-09-05 NOTE — ED ADULT NURSE REASSESSMENT NOTE - NS ED NURSE REASSESS COMMENT FT1
74 y/o male received in room 44W A&OX4 and in no acute distress. Alert and responsive to all stimuli. Respirations even and unlabored. Denies n/v/d and sick contact. Safety initiated and maintained. Plan of care ongoing. GAIL Cornejo RN

## 2023-09-06 LAB
ANION GAP SERPL CALC-SCNC: 7 MMOL/L — SIGNIFICANT CHANGE UP (ref 5–17)
BUN SERPL-MCNC: 25 MG/DL — HIGH (ref 7–23)
CALCIUM SERPL-MCNC: 8.8 MG/DL — SIGNIFICANT CHANGE UP (ref 8.5–10.1)
CHLORIDE SERPL-SCNC: 108 MMOL/L — SIGNIFICANT CHANGE UP (ref 96–108)
CO2 SERPL-SCNC: 21 MMOL/L — LOW (ref 22–31)
CREAT SERPL-MCNC: 2.06 MG/DL — HIGH (ref 0.5–1.3)
EGFR: 33 ML/MIN/1.73M2 — LOW
GLUCOSE SERPL-MCNC: 70 MG/DL — SIGNIFICANT CHANGE UP (ref 70–99)
HCT VFR BLD CALC: 43.9 % — SIGNIFICANT CHANGE UP (ref 39–50)
HCV AB S/CO SERPL IA: 0.19 S/CO — SIGNIFICANT CHANGE UP (ref 0–0.99)
HCV AB SERPL-IMP: SIGNIFICANT CHANGE UP
HGB BLD-MCNC: 14.5 G/DL — SIGNIFICANT CHANGE UP (ref 13–17)
HIV 1+2 AB+HIV1 P24 AG SERPL QL IA: SIGNIFICANT CHANGE UP
MCHC RBC-ENTMCNC: 27.8 PG — SIGNIFICANT CHANGE UP (ref 27–34)
MCHC RBC-ENTMCNC: 33 G/DL — SIGNIFICANT CHANGE UP (ref 32–36)
MCV RBC AUTO: 84.3 FL — SIGNIFICANT CHANGE UP (ref 80–100)
NRBC # BLD: 0 /100 WBCS — SIGNIFICANT CHANGE UP (ref 0–0)
PLATELET # BLD AUTO: 251 K/UL — SIGNIFICANT CHANGE UP (ref 150–400)
POTASSIUM SERPL-MCNC: 4.4 MMOL/L — SIGNIFICANT CHANGE UP (ref 3.5–5.3)
POTASSIUM SERPL-SCNC: 4.4 MMOL/L — SIGNIFICANT CHANGE UP (ref 3.5–5.3)
RBC # BLD: 5.21 M/UL — SIGNIFICANT CHANGE UP (ref 4.2–5.8)
RBC # FLD: 14.5 % — SIGNIFICANT CHANGE UP (ref 10.3–14.5)
SODIUM SERPL-SCNC: 136 MMOL/L — SIGNIFICANT CHANGE UP (ref 135–145)
WBC # BLD: 5.07 K/UL — SIGNIFICANT CHANGE UP (ref 3.8–10.5)
WBC # FLD AUTO: 5.07 K/UL — SIGNIFICANT CHANGE UP (ref 3.8–10.5)

## 2023-09-06 PROCEDURE — 99232 SBSQ HOSP IP/OBS MODERATE 35: CPT

## 2023-09-06 PROCEDURE — 99232 SBSQ HOSP IP/OBS MODERATE 35: CPT | Mod: FS

## 2023-09-06 PROCEDURE — 99233 SBSQ HOSP IP/OBS HIGH 50: CPT | Mod: FS

## 2023-09-06 PROCEDURE — 93010 ELECTROCARDIOGRAM REPORT: CPT

## 2023-09-06 RX ORDER — ERTAPENEM SODIUM 1 G/1
1000 INJECTION, POWDER, LYOPHILIZED, FOR SOLUTION INTRAMUSCULAR; INTRAVENOUS ONCE
Refills: 0 | Status: COMPLETED | OUTPATIENT
Start: 2023-09-06 | End: 2023-09-06

## 2023-09-06 RX ORDER — ERTAPENEM SODIUM 1 G/1
1000 INJECTION, POWDER, LYOPHILIZED, FOR SOLUTION INTRAMUSCULAR; INTRAVENOUS EVERY 24 HOURS
Refills: 0 | Status: DISCONTINUED | OUTPATIENT
Start: 2023-09-07 | End: 2023-09-08

## 2023-09-06 RX ORDER — ERTAPENEM SODIUM 1 G/1
INJECTION, POWDER, LYOPHILIZED, FOR SOLUTION INTRAMUSCULAR; INTRAVENOUS
Refills: 0 | Status: DISCONTINUED | OUTPATIENT
Start: 2023-09-06 | End: 2023-09-08

## 2023-09-06 RX ORDER — HEPARIN SODIUM 5000 [USP'U]/ML
5000 INJECTION INTRAVENOUS; SUBCUTANEOUS EVERY 8 HOURS
Refills: 0 | Status: DISCONTINUED | OUTPATIENT
Start: 2023-09-06 | End: 2023-09-08

## 2023-09-06 RX ADMIN — HEPARIN SODIUM 5000 UNIT(S): 5000 INJECTION INTRAVENOUS; SUBCUTANEOUS at 05:45

## 2023-09-06 RX ADMIN — HEPARIN SODIUM 5000 UNIT(S): 5000 INJECTION INTRAVENOUS; SUBCUTANEOUS at 22:16

## 2023-09-06 RX ADMIN — Medication 20 MILLIGRAM(S): at 05:45

## 2023-09-06 RX ADMIN — ERTAPENEM SODIUM 1000 MILLIGRAM(S): 1 INJECTION, POWDER, LYOPHILIZED, FOR SOLUTION INTRAMUSCULAR; INTRAVENOUS at 15:45

## 2023-09-06 NOTE — PROGRESS NOTE ADULT - ASSESSMENT
73 year old male admitted with nonobstructing left staghorn calculus and MDR E. coli UTI. Afebrile, vitals stable, leukocytosis resolved was on Zosyn --> now Ertapenem, ED slowly resolving Cr 2.41--> 2.06.     - continue Ertapenem per infectious disease  - patient to follow-up outpatient in office with Dr. Patterson next week to discuss PCNL/stone removal planning  - will place midline catheter for IV antibiotics on discharge   - no urologic procedures planned for this admission  - continue to trend Cr  - continue care per medical team  - will discuss with Dr. Patterson

## 2023-09-06 NOTE — PROGRESS NOTE ADULT - ASSESSMENT
73M w/ no significant PMH p/w L flank pain. Found to have L non-obstructing nephrolithiasis, worsening ED, and +UA    #Non-obstructing nephrolithiasis  #UTI  - IV LR  - Pain control prn  - changed to ertapenem by id, midline in   - follow up urology    #ED  - likely 2/2 bactrim  - IV LR  - renally adjust meds  - avoid nephrotoxic agents    #Hyponatremia  - IV LR  - monitor BMP    #FEN/PPX  - regular diet  - SQH for DVT ppx

## 2023-09-06 NOTE — PROGRESS NOTE ADULT - ASSESSMENT
73M  p/w L flank pain. Symptoms began one day prior to presentation and has progressively worsened. Of note, patient was recently seen for urinary retention. Pt was seen by urology and scheduled for follow up appointment 9/10/23.   Pt was called for ESBL E. Coli UTI. Patient was started on bactrim which was completed. Denies fever/chills/sweats, nausea/vomiting, abdominal pain, constipation.  afebrile  vital signs stable  UA positive  urine culture from 8/26 with ESBL ecoli and e.cloacae-no oral antibiotics options available   patient needs urologic procedure as well as has some symptoms inclduing flank pain   also has new ED    9/6: no fevers, WBC normalized, Cr better, UC with ESBL E. coli, Ertapenem IV continued.     plan:  continue renally dosed Ertapenem 500mg every 24hrs   place midline and arrange for home antibiotics  duration TBD - possible urological procedure  pain control  HIV screen pending result        73M  p/w L flank pain. Symptoms began one day prior to presentation and has progressively worsened. Of note, patient was recently seen for urinary retention. Pt was seen by urology and scheduled for follow up appointment 9/10/23.   Pt was called for ESBL E. Coli UTI. Patient was started on bactrim which was completed. Denies fever/chills/sweats, nausea/vomiting, abdominal pain, constipation.  afebrile  vital signs stable  UA positive  urine culture from 8/26 with ESBL ecoli and e.cloacae-no oral antibiotics options available   patient needs urologic procedure as well as has some symptoms inclduing flank pain   also has new ED    9/6: no fevers, WBC normalized, Cr better, UC with ESBL E. coli, Ertapenem IV continued.     plan:  increase dose of  Ertapenem 1000mg every 24hrs   place midline and arrange for home antibiotics  duration TBD - possible urological procedure  pain control  HIV screen pending result

## 2023-09-06 NOTE — PROCEDURE NOTE - ADDITIONAL PROCEDURE DETAILS
Patient seen at bedside for midline catheter placement for antibiotics therapy upon discharge.   Verbal consent obtained from patient after risks/benefits/alternatives explained.   Upper extremity prepped and draped in usual sterile fashion. 4Fr 1 cm single lumen midline catheter placed using ultrasound guided seldinger technique, Left basilic vein. Flushes well, with good venous return. Patient tolerated procedure well without complication and was left in no acute distress.

## 2023-09-07 ENCOUNTER — TRANSCRIPTION ENCOUNTER (OUTPATIENT)
Age: 73
End: 2023-09-07

## 2023-09-07 VITALS
HEART RATE: 75 BPM | SYSTOLIC BLOOD PRESSURE: 187 MMHG | RESPIRATION RATE: 19 BRPM | TEMPERATURE: 97 F | DIASTOLIC BLOOD PRESSURE: 103 MMHG | OXYGEN SATURATION: 97 %

## 2023-09-07 PROCEDURE — 99232 SBSQ HOSP IP/OBS MODERATE 35: CPT | Mod: FS

## 2023-09-07 PROCEDURE — 99239 HOSP IP/OBS DSCHRG MGMT >30: CPT

## 2023-09-07 RX ORDER — ERTAPENEM SODIUM 1 G/1
1 INJECTION, POWDER, LYOPHILIZED, FOR SOLUTION INTRAMUSCULAR; INTRAVENOUS
Qty: 9 | Refills: 0
Start: 2023-09-07 | End: 2023-09-15

## 2023-09-07 RX ORDER — ERTAPENEM SODIUM 1 G/1
1 INJECTION, POWDER, LYOPHILIZED, FOR SOLUTION INTRAMUSCULAR; INTRAVENOUS
Qty: 0 | Refills: 0 | DISCHARGE
Start: 2023-09-07

## 2023-09-07 RX ADMIN — MORPHINE SULFATE 2 MILLIGRAM(S): 50 CAPSULE, EXTENDED RELEASE ORAL at 12:27

## 2023-09-07 RX ADMIN — ERTAPENEM SODIUM 120 MILLIGRAM(S): 1 INJECTION, POWDER, LYOPHILIZED, FOR SOLUTION INTRAMUSCULAR; INTRAVENOUS at 14:22

## 2023-09-07 RX ADMIN — SODIUM CHLORIDE 125 MILLILITER(S): 9 INJECTION, SOLUTION INTRAVENOUS at 09:22

## 2023-09-07 RX ADMIN — MORPHINE SULFATE 2 MILLIGRAM(S): 50 CAPSULE, EXTENDED RELEASE ORAL at 12:42

## 2023-09-07 RX ADMIN — Medication 20 MILLIGRAM(S): at 05:49

## 2023-09-07 RX ADMIN — HEPARIN SODIUM 5000 UNIT(S): 5000 INJECTION INTRAVENOUS; SUBCUTANEOUS at 05:49

## 2023-09-07 RX ADMIN — HEPARIN SODIUM 5000 UNIT(S): 5000 INJECTION INTRAVENOUS; SUBCUTANEOUS at 14:22

## 2023-09-07 NOTE — PROGRESS NOTE ADULT - SUBJECTIVE AND OBJECTIVE BOX
[facility]  [place] [id number]  Patient seen and examined at bedside, patient without complaints.   Reports he is urinating spontaneously without burning, feeling of incomplete emptying   Remains on ertapenem for MDR E. coli UTI.   Denies nausea, vomiting, fever, chills, abdominal pain, inability to void.       MEDICATIONS  (STANDING):  enalapril 20 milliGRAM(s) Oral daily  ertapenem  IVPB      ertapenem  IVPB 1000 milliGRAM(s) IV Intermittent every 24 hours  heparin   Injectable 5000 Unit(s) SubCutaneous every 8 hours    MEDICATIONS  (PRN):  acetaminophen     Tablet .. 650 milliGRAM(s) Oral every 6 hours PRN Temp greater or equal to 38C (100.4F), Mild Pain (1 - 3)  aluminum hydroxide/magnesium hydroxide/simethicone Suspension 30 milliLiter(s) Oral every 4 hours PRN Dyspepsia  melatonin 3 milliGRAM(s) Oral at bedtime PRN Insomnia  morphine  - Injectable 2 milliGRAM(s) IV Push every 4 hours PRN Severe Pain (7 - 10)  ondansetron Injectable 4 milliGRAM(s) IV Push every 8 hours PRN Nausea and/or Vomiting      Vital Signs Last 24 Hrs  T(C): 36.8 (07 Sep 2023 10:58), Max: 36.9 (06 Sep 2023 23:15)  T(F): 98.2 (07 Sep 2023 10:58), Max: 98.5 (06 Sep 2023 23:15)  HR: 81 (07 Sep 2023 14:51) (73 - 86)  BP: 172/103 (07 Sep 2023 14:51) (160/80 - 178/100)  RR: 19 (07 Sep 2023 10:58) (18 - 19)  SpO2: 98% (07 Sep 2023 10:58) (98% - 99%)    Parameters below as of 07 Sep 2023 10:58  Patient On (Oxygen Delivery Method): room air      PHYSICAL EXAM:  General: NAD, resting comfortably in bed  Neuro:  Alert & oriented x 3  HEENT: NCAT, EOMI, conjunctiva clear  Lung: respirations nonlabored on room air, good inspiratory effort  : bladder soft, nontender in suprapubic area.   Extremities: no pedal edema or calf tenderness noted       LABS:                        14.5   5.07  )-----------( 251      ( 06 Sep 2023 07:08 )             43.9     09-06    136  |  108  |  25<H>  ----------------------------<  70  4.4   |  21<L>  |  2.06<H>    Ca    8.8      06 Sep 2023 07:08        Urinalysis Basic - ( 06 Sep 2023 07:08 )    Color: x / Appearance: x / SG: x / pH: x  Gluc: 70 mg/dL / Ketone: x  / Bili: x / Urobili: x   Blood: x / Protein: x / Nitrite: x   Leuk Esterase: x / RBC: x / WBC x   Sq Epi: x / Non Sq Epi: x / Bacteria: x    
[place]  Germain [id number].  Patient seen and examined at bedside, patient without complaints.   Reports he is urinating spontaneously without burning, feeling of incomplete emptying   Remains on ertapenem for MDR E. coli UTI.   Denies nausea, vomiting, fever, chills, abdominal pain, inability to void.     MEDICATIONS  (STANDING):  enalapril 20 milliGRAM(s) Oral daily  ertapenem  IVPB 500 milliGRAM(s) IV Intermittent every 24 hours  ertapenem  IVPB      heparin   Injectable 5000 Unit(s) IV Push every 8 hours  lactated ringers. 1000 milliLiter(s) (125 mL/Hr) IV Continuous <Continuous>    MEDICATIONS  (PRN):  acetaminophen     Tablet .. 650 milliGRAM(s) Oral every 6 hours PRN Temp greater or equal to 38C (100.4F), Mild Pain (1 - 3)  aluminum hydroxide/magnesium hydroxide/simethicone Suspension 30 milliLiter(s) Oral every 4 hours PRN Dyspepsia  melatonin 3 milliGRAM(s) Oral at bedtime PRN Insomnia  morphine  - Injectable 2 milliGRAM(s) IV Push every 4 hours PRN Severe Pain (7 - 10)  ondansetron Injectable 4 milliGRAM(s) IV Push every 8 hours PRN Nausea and/or Vomiting      Vital Signs Last 24 Hrs  T(C): 36.6 (06 Sep 2023 11:00), Max: 36.6 (06 Sep 2023 00:03)  T(F): 97.8 (06 Sep 2023 11:00), Max: 97.9 (06 Sep 2023 00:03)  HR: 86 (06 Sep 2023 11:00) (79 - 86)  BP: 157/83 (06 Sep 2023 11:00) (148/78 - 180/83)  BP(mean): --  RR: 18 (06 Sep 2023 11:00) (17 - 18)  SpO2: 96% (06 Sep 2023 11:00) (96% - 98%)    Parameters below as of 06 Sep 2023 11:00  Patient On (Oxygen Delivery Method): room air      PHYSICAL EXAM:  General: NAD, resting comfortably in bed  Neuro:  Alert & oriented x 3  HEENT: NCAT, EOMI, conjunctiva clear  Lung: respirations nonlabored on room air, good inspiratory effort  : bladder soft, nontender in suprapubic area.   Extremities: no pedal edema or calf tenderness noted     I&O's Detail    05 Sep 2023 07:01  -  06 Sep 2023 07:00  --------------------------------------------------------  IN:    Lactated Ringers: 1800 mL    Oral Fluid: 540 mL  Total IN: 2340 mL    OUT:    Voided (mL): 1300 mL  Total OUT: 1300 mL    Total NET: 1040 mL          LABS:                        14.5   5.07  )-----------( 251      ( 06 Sep 2023 07:08 )             43.9     09-06    136  |  108  |  25<H>  ----------------------------<  70  4.4   |  21<L>  |  2.06<H>    Ca    8.8      06 Sep 2023 07:08    TPro  8.0  /  Alb  3.1<L>  /  TBili  0.5  /  DBili  x   /  AST  16  /  ALT  23  /  AlkPhos  48  09-05      Urinalysis Basic - ( 06 Sep 2023 07:08 )    Color: x / Appearance: x / SG: x / pH: x  Gluc: 70 mg/dL / Ketone: x  / Bili: x / Urobili: x   Blood: x / Protein: x / Nitrite: x   Leuk Esterase: x / RBC: x / WBC x   Sq Epi: x / Non Sq Epi: x / Bacteria: x        
PLACIDE, CLAUDE  MRN-[id number]    Follow Up:  ESBL E. coli uti     Interval History: the pt was seen and examined earlier, no acute distress, feeling better, [facility] video  used # [id number]. Pt has no fevers, RA, wbc normalized.     PAST MEDICAL & SURGICAL HISTORY:      ROS:    [ ] Unobtainable because:  [x ] All other systems negative    Constitutional: no fever, no chills  Head: no trauma  Eyes: no vision changes, no eye pain  ENT:  no sore throat, no rhinorrhea  Cardiovascular:  no chest pain, no palpitation  Respiratory:  no SOB, no cough  GI:  no abd pain, no vomiting, no diarrhea  urinary: no dysuria, no hematuria, no flank pain  musculoskeletal:  no joint pain, no joint swelling  skin:  no rash  neurology:  no headache, no seizure, no change in mental status  psych: no anxiety, no depression         Allergies  No Known Allergies        ANTIMICROBIALS:  ertapenem  IVPB    ertapenem  IVPB 1000 every 24 hours      OTHER MEDS:  acetaminophen     Tablet .. 650 milliGRAM(s) Oral every 6 hours PRN  aluminum hydroxide/magnesium hydroxide/simethicone Suspension 30 milliLiter(s) Oral every 4 hours PRN  enalapril 20 milliGRAM(s) Oral daily  heparin   Injectable 5000 Unit(s) SubCutaneous every 8 hours  melatonin 3 milliGRAM(s) Oral at bedtime PRN  morphine  - Injectable 2 milliGRAM(s) IV Push every 4 hours PRN  ondansetron Injectable 4 milliGRAM(s) IV Push every 8 hours PRN      Vital Signs Last 24 Hrs  T(C): 36.8 (07 Sep 2023 10:58), Max: 36.9 (06 Sep 2023 23:15)  T(F): 98.2 (07 Sep 2023 10:58), Max: 98.5 (06 Sep 2023 23:15)  HR: 81 (07 Sep 2023 14:51) (73 - 86)  BP: 172/103 (07 Sep 2023 14:51) (160/80 - 178/100)  BP(mean): --  RR: 19 (07 Sep 2023 10:58) (18 - 19)  SpO2: 98% (07 Sep 2023 10:58) (98% - 99%)    Parameters below as of 07 Sep 2023 10:58  Patient On (Oxygen Delivery Method): room air        Physical Exam:  Constitutional: non-toxic, no distress  HEAD/EYES: anicteric, no conjunctival injection  ENT:  supple, no thrush  Cardiovascular:   normal S1, S2, no murmur, no edema  Respiratory:  clear BS bilaterally, no wheezes, no rales  GI:  soft, tender on left side, normal bowel sounds  :  no ocampo, no CVA tenderness on today's exam  Musculoskeletal:  no synovitis, normal ROM  Neurologic: awake and alert, normal strength, no focal findings  Skin:  no rash, no erythema, no phlebitis  Heme/Onc: no lymphadenopathy   Psychiatric:  awake, alert, appropriate mood    WBC Count: 5.07 K/uL (09-06 @ 07:08)  WBC Count: 11.03 K/uL (09-05 @ 01:30)                            14.5   5.07  )-----------( 251      ( 06 Sep 2023 07:08 )             43.9       09-06    136  |  108  |  25<H>  ----------------------------<  70  4.4   |  21<L>  |  2.06<H>    Ca    8.8      06 Sep 2023 07:08        Urinalysis Basic - ( 06 Sep 2023 07:08 )    Color: x / Appearance: x / SG: x / pH: x  Gluc: 70 mg/dL / Ketone: x  / Bili: x / Urobili: x   Blood: x / Protein: x / Nitrite: x   Leuk Esterase: x / RBC: x / WBC x   Sq Epi: x / Non Sq Epi: x / Bacteria: x        Creatinine Trend: 2.06<--, 2.41<--, 1.88<--      MICROBIOLOGY:  v  Clean Catch Clean Catch (Midstream)  09-05-23   >100,000 CFU/ml Escherichia coli  --  --      Clean Catch Clean Catch (Midstream)  08-26-23   >100,000 CFU/ml Escherichia coli ESBL  >100,000 CFU/ml Enterobacter cloacae complex  --  Escherichia coli ESBL  Enterobacter cloacae complex      RADIOLOGY:    
PLACIDE, CLAUDE  MRN-[id number]    Follow Up:  ESBL E. coli uti     Interval History: the pt was seen and examined earlier, no acute distress, having his breakfast, [facility] telephone  used # [id number]. Pt admits that he still has mild left sided flank pain, no fevers, RA, WBC normalized.     PAST MEDICAL & SURGICAL HISTORY:      ROS:    [ ] Unobtainable because:  [x ] All other systems negative    Constitutional: no fever, no chills  Head: no trauma  Eyes: no vision changes, no eye pain  ENT:  no sore throat, no rhinorrhea  Cardiovascular:  no chest pain, no palpitation  Respiratory:  no SOB, no cough  GI:  no abd pain, no vomiting, no diarrhea  urinary: no dysuria, no hematuria, + flank pain  musculoskeletal:  no joint pain, no joint swelling  skin:  no rash  neurology:  no headache, no seizure, no change in mental status  psych: no anxiety, no depression         Allergies  No Known Allergies        ANTIMICROBIALS:  ertapenem  IVPB 500 every 24 hours  ertapenem  IVPB        OTHER MEDS:  acetaminophen     Tablet .. 650 milliGRAM(s) Oral every 6 hours PRN  aluminum hydroxide/magnesium hydroxide/simethicone Suspension 30 milliLiter(s) Oral every 4 hours PRN  enalapril 20 milliGRAM(s) Oral daily  heparin   Injectable 5000 Unit(s) IV Push every 8 hours  lactated ringers. 1000 milliLiter(s) IV Continuous <Continuous>  melatonin 3 milliGRAM(s) Oral at bedtime PRN  morphine  - Injectable 2 milliGRAM(s) IV Push every 4 hours PRN  ondansetron Injectable 4 milliGRAM(s) IV Push every 8 hours PRN      Vital Signs Last 24 Hrs  T(C): 36.6 (06 Sep 2023 11:00), Max: 37.2 (05 Sep 2023 11:22)  T(F): 97.8 (06 Sep 2023 11:00), Max: 98.9 (05 Sep 2023 11:22)  HR: 86 (06 Sep 2023 11:00) (72 - 86)  BP: 157/83 (06 Sep 2023 11:00) (148/78 - 180/83)  BP(mean): --  RR: 18 (06 Sep 2023 11:00) (17 - 18)  SpO2: 96% (06 Sep 2023 11:00) (96% - 98%)    Parameters below as of 06 Sep 2023 11:00  Patient On (Oxygen Delivery Method): room air        Physical Exam:  Constitutional: non-toxic, no distress  HEAD/EYES: anicteric, no conjunctival injection  ENT:  supple, no thrush  Cardiovascular:   normal S1, S2, no murmur, no edema  Respiratory:  clear BS bilaterally, no wheezes, no rales  GI:  soft, tender on left side, normal bowel sounds  :  no ocampo, +CVA tenderness Left-sided   Musculoskeletal:  no synovitis, normal ROM  Neurologic: awake and alert, normal strength, no focal findings  Skin:  no rash, no erythema, no phlebitis  Heme/Onc: no lymphadenopathy   Psychiatric:  awake, alert, appropriate mood    WBC Count: 5.07 K/uL (09-06 @ 07:08)  WBC Count: 11.03 K/uL (09-05 @ 01:30)                            14.5   5.07  )-----------( 251      ( 06 Sep 2023 07:08 )             43.9       09-06    136  |  108  |  25<H>  ----------------------------<  70  4.4   |  21<L>  |  2.06<H>    Ca    8.8      06 Sep 2023 07:08    TPro  8.0  /  Alb  3.1<L>  /  TBili  0.5  /  DBili  x   /  AST  16  /  ALT  23  /  AlkPhos  48  09-05      Urinalysis Basic - ( 06 Sep 2023 07:08 )    Color: x / Appearance: x / SG: x / pH: x  Gluc: 70 mg/dL / Ketone: x  / Bili: x / Urobili: x   Blood: x / Protein: x / Nitrite: x   Leuk Esterase: x / RBC: x / WBC x   Sq Epi: x / Non Sq Epi: x / Bacteria: x        Creatinine Trend: 2.06<--, 2.41<--, 1.88<--      MICROBIOLOGY:  v  Clean Catch Clean Catch (Midstream)  09-05-23   >100,000 CFU/ml Escherichia coli  --  --      Clean Catch Clean Catch (Midstream)  08-26-23   >100,000 CFU/ml Escherichia coli ESBL  >100,000 CFU/ml Enterobacter cloacae complex  --  Escherichia coli ESBL  Enterobacter cloacae complex          RADIOLOGY:    
Patient is a 73y old  Male who presents with a chief complaint of   INTERVAL HPI/OVERNIGHT EVENTS:    MEDICATIONS  (STANDING):  enalapril 20 milliGRAM(s) Oral daily  ertapenem  IVPB 500 milliGRAM(s) IV Intermittent every 24 hours  ertapenem  IVPB      heparin   Injectable 5000 Unit(s) IV Push every 8 hours  lactated ringers. 1000 milliLiter(s) (125 mL/Hr) IV Continuous <Continuous>    MEDICATIONS  (PRN):  acetaminophen     Tablet .. 650 milliGRAM(s) Oral every 6 hours PRN Temp greater or equal to 38C (100.4F), Mild Pain (1 - 3)  aluminum hydroxide/magnesium hydroxide/simethicone Suspension 30 milliLiter(s) Oral every 4 hours PRN Dyspepsia  melatonin 3 milliGRAM(s) Oral at bedtime PRN Insomnia  morphine  - Injectable 2 milliGRAM(s) IV Push every 4 hours PRN Severe Pain (7 - 10)  ondansetron Injectable 4 milliGRAM(s) IV Push every 8 hours PRN Nausea and/or Vomiting    Allergies    No Known Allergies    Intolerances      REVIEW OF SYSTEMS:  All other systems reviewed and are negative    Vital Signs Last 24 Hrs  T(C): 36.6 (06 Sep 2023 11:00), Max: 36.6 (06 Sep 2023 00:03)  T(F): 97.8 (06 Sep 2023 11:00), Max: 97.9 (06 Sep 2023 00:03)  HR: 86 (06 Sep 2023 11:00) (79 - 86)  BP: 157/83 (06 Sep 2023 11:00) (148/78 - 180/83)  BP(mean): --  RR: 18 (06 Sep 2023 11:00) (17 - 18)  SpO2: 96% (06 Sep 2023 11:00) (96% - 98%)    Parameters below as of 06 Sep 2023 11:00  Patient On (Oxygen Delivery Method): room air      Daily     Daily   I&O's Summary    05 Sep 2023 07:01  -  06 Sep 2023 07:00  --------------------------------------------------------  IN: 2340 mL / OUT: 1300 mL / NET: 1040 mL    06 Sep 2023 07:01  -  06 Sep 2023 15:22  --------------------------------------------------------  IN: 780 mL / OUT: 1200 mL / NET: -420 mL      CAPILLARY BLOOD GLUCOSE        PHYSICAL EXAM:  GENERAL: NAD,    HEAD:  Atraumatic, Normocephalic  EYES: EOMI, PERRLA, conjunctiva and sclera clear  ENMT: No tonsillar erythema, exudates, or enlargement; Moist mucous membranes, Good dentition, No lesions  NECK: Supple, No JVD, Normal thyroid  NERVOUS SYSTEM:  Alert & Oriented X3, Good concentration; Motor Strength 5/5 B/L upper and lower extremities; DTRs 2+ intact and symmetric  CHEST/LUNG: Clear to percussion bilaterally; No rales, rhonchi, wheezing, or rubs  HEART: Regular rate and rhythm; No murmurs, rubs, or gallops  ABDOMEN: Soft, Nontender, Nondistended; Bowel sounds present  EXTREMITIES:  2+ Peripheral Pulses, No clubbing, cyanosis, or edema  LYMPH: No lymphadenopathy noted  SKIN: No rashes or lesions    Labs                          14.5   5.07  )-----------( 251      ( 06 Sep 2023 07:08 )             43.9     09-06    136  |  108  |  25<H>  ----------------------------<  70  4.4   |  21<L>  |  2.06<H>    Ca    8.8      06 Sep 2023 07:08    TPro  8.0  /  Alb  3.1<L>  /  TBili  0.5  /  DBili  x   /  AST  16  /  ALT  23  /  AlkPhos  48  09-05          Urinalysis Basic - ( 06 Sep 2023 07:08 )    Color: x / Appearance: x / SG: x / pH: x  Gluc: 70 mg/dL / Ketone: x  / Bili: x / Urobili: x   Blood: x / Protein: x / Nitrite: x   Leuk Esterase: x / RBC: x / WBC x   Sq Epi: x / Non Sq Epi: x / Bacteria: x        Culture - Urine (collected 05 Sep 2023 01:30)  Source: Clean Catch Clean Catch (Midstream)  Preliminary Report (06 Sep 2023 09:56):    >100,000 CFU/ml Escherichia coli                DVT prophylaxis: > Lovenox 40mg SQ daily  > Heparin   > SCD's

## 2023-09-07 NOTE — PROGRESS NOTE ADULT - ASSESSMENT
73M  p/w L flank pain. Symptoms began one day prior to presentation and has progressively worsened. Of note, patient was recently seen for urinary retention. Pt was seen by urology and scheduled for follow up appointment 9/10/23.   Pt was called for ESBL E. Coli UTI. Patient was started on bactrim which was completed. Denies fever/chills/sweats, nausea/vomiting, abdominal pain, constipation.  afebrile  vital signs stable  UA positive  urine culture from 8/26 with ESBL ecoli and e.cloacae-no oral antibiotics options available   patient needs urologic procedure as well as has some symptoms inclduing flank pain   also has new ED    9/6: no fevers, WBC normalized, Cr better, UC with ESBL E. coli, Ertapenem IV continued.   Attending addendum:  I have reviewed all pertinent clinical information and agree with the NP's note.   continue carbapenem for ESBL infection   midline placement  arrange home antibiotics   hydration  trend creatinine     9/7: pt is improving, no longer has flank pain, no fevers, RA, WBC normalized, Cr better 2.06, HIV negative, midline placed c/d/i, Rx for ertapenem IV provided.     plan:  continue Ertapenem 1000mg every 24hrs   arrange for home antibiotics  pt will need 10 days course abx - last dose 9/15  pain control  HIV screen negative  follow up with urology as op    when ready for discharge:  midline placed  continue Ertapenem IV 1000 mg daily - last dose 9/15  one time lab work: cbc with diff, cmp  follow up with ID in the office  remove midline upon completion of the course of abx    discussed with Dr. Thompson  discussed with the pt , all questions answered to the best of my ability

## 2023-09-07 NOTE — DISCHARGE NOTE PROVIDER - NSDCMRMEDTOKEN_GEN_ALL_CORE_FT
enalapril 20 mg oral tablet: 1 tab(s) orally once a day  ertapenem 1 g injection: 1 gram(s) injectable once a day

## 2023-09-07 NOTE — PROGRESS NOTE ADULT - ASSESSMENT
73 year old male admitted with nonobstructing left staghorn calculus and MDR E. coli UTI. Afebrile, vitals stable, leukocytosis resolved was on Zosyn --> now Ertapenem, ED slowly resolving Cr 2.41--> 2.06. Midline placed 9/7    - continue Ertapenem per infectious disease  - patient to follow-up outpatient in office with Dr. Patterson next week to discuss PCNL/stone removal planning  - no urologic procedures planned for this admission  - continue to trend Cr  - continue care per medical team  - will discuss with Dr. Patterson

## 2023-09-07 NOTE — DISCHARGE NOTE PROVIDER - HOSPITAL COURSE
73M w/ no significant PMH p/w L flank pain. Found to have L non-obstructing nephrolithiasis, worsening ED, and +UA    staghorn type nonobstructing left intrarenal calculi, with ESBL E. Coli, and enterobacter cloac  #acute cystitis ESBL    - changed to ertapenem by id, midline in dc planning w urology f/u  - follow up urology    #ED  - to follow up with pcp/urology    #Hyponatremia  - improved       pt seen and examined 45 min spent on dc planning     Lab test review, Radiology Review, Vitals review, Consultant review and discussion, Physical examination, IDR, Assessment and plan; Plan discussion with patient and family

## 2023-09-07 NOTE — DISCHARGE NOTE PROVIDER - NSDCCPCAREPLAN_GEN_ALL_CORE_FT
PRINCIPAL DISCHARGE DIAGNOSIS  Diagnosis: Acute UTI  Assessment and Plan of Treatment: continue iv antibiotics   f/u with urology 9/10 for kidney stone treatment      SECONDARY DISCHARGE DIAGNOSES  Diagnosis: Acute UTI  Assessment and Plan of Treatment:

## 2023-09-07 NOTE — DISCHARGE NOTE NURSING/CASE MANAGEMENT/SOCIAL WORK - PATIENT PORTAL LINK FT
You can access the FollowMyHealth Patient Portal offered by [facility] by registering at the following website: [email or web address]. By joining [employer]’s FollowMyHealth portal, you will also be able to view your health information using other applications (apps) compatible with our system.

## 2023-09-07 NOTE — DISCHARGE NOTE PROVIDER - NSDCFUSCHEDAPPT_GEN_ALL_CORE_FT
oTm Patterson  [facility] Physician [facility]  UROLOGY [address]  Scheduled Appointment: 09/12/2023

## 2023-09-07 NOTE — PROGRESS NOTE ADULT - NS ATTEND AMEND GEN_ALL_CORE FT
I have reviewed all pertinent clinical information and agree with the NP's note.  continue Ertapenem IV 1000 mg daily - last dose 9/15 though may be extended depending on when his procedure is taking place  one time lab work: cbc with diff, cmp  follow up in infectious disease office outpatient     Trace Thompson,   Infectious Disease Attending  Reachable via Microsoft Teams or ID office: [phone]  After 5pm/weekends please call [phone] for all inquiries and new consults
I have reviewed all pertinent clinical information and agree with the NP's note.   continue carbapenem for ESBL infection   midline placement  arrange home antibiotics   hydration  trend creatinine     Trace Thompson, DO  Infectious Disease Attending  Reachable via Microsoft Teams or ID office: [phone]  After 5pm/weekends please call [phone] for all inquiries and new consults

## 2023-09-07 NOTE — DISCHARGE NOTE NURSING/CASE MANAGEMENT/SOCIAL WORK - NSDCPEFALRISK_GEN_ALL_CORE
For information on Fall & Injury Prevention, visit: [email or web address] OR  [email or web address] OR  https://www.cdc.gov/steadi/patient.html

## 2023-09-07 NOTE — DISCHARGE NOTE PROVIDER - CARE PROVIDER_API CALL
Tom Patterson  Urology  [address]  [place] [address]  Phone: [phone]  Fax: [phone]  Follow Up Time:

## 2023-09-08 ENCOUNTER — INPATIENT (INPATIENT)
Facility: HOSPITAL | Age: 73
LOS: 2 days | Discharge: NURSING, REHAB OR LONG-TERM CARE | End: 2023-09-11
Attending: STUDENT IN AN ORGANIZED HEALTH CARE EDUCATION/TRAINING PROGRAM | Admitting: STUDENT IN AN ORGANIZED HEALTH CARE EDUCATION/TRAINING PROGRAM
Payer: MEDICARE

## 2023-09-08 VITALS
TEMPERATURE: 98 F | WEIGHT: 229.94 LBS | HEIGHT: 66 IN | HEART RATE: 87 BPM | DIASTOLIC BLOOD PRESSURE: 91 MMHG | OXYGEN SATURATION: 95 % | RESPIRATION RATE: 18 BRPM | SYSTOLIC BLOOD PRESSURE: 151 MMHG

## 2023-09-08 DIAGNOSIS — N18.30 CHRONIC KIDNEY DISEASE, STAGE 3 UNSPECIFIED: ICD-10-CM

## 2023-09-08 DIAGNOSIS — N39.0 URINARY TRACT INFECTION, SITE NOT SPECIFIED: ICD-10-CM

## 2023-09-08 DIAGNOSIS — I10 ESSENTIAL (PRIMARY) HYPERTENSION: ICD-10-CM

## 2023-09-08 PROBLEM — Z00.00 ENCOUNTER FOR PREVENTIVE HEALTH EXAMINATION: Status: ACTIVE | Noted: 2023-09-08

## 2023-09-08 LAB
ALBUMIN SERPL ELPH-MCNC: 3.1 G/DL — LOW (ref 3.3–5)
ALP SERPL-CCNC: 51 U/L — SIGNIFICANT CHANGE UP (ref 40–120)
ALT FLD-CCNC: 45 U/L — SIGNIFICANT CHANGE UP (ref 12–78)
ANION GAP SERPL CALC-SCNC: 2 MMOL/L — LOW (ref 5–17)
AST SERPL-CCNC: 41 U/L — HIGH (ref 15–37)
BASOPHILS # BLD AUTO: 0.05 K/UL — SIGNIFICANT CHANGE UP (ref 0–0.2)
BASOPHILS NFR BLD AUTO: 0.7 % — SIGNIFICANT CHANGE UP (ref 0–2)
BILIRUB SERPL-MCNC: 0.4 MG/DL — SIGNIFICANT CHANGE UP (ref 0.2–1.2)
BUN SERPL-MCNC: 33 MG/DL — HIGH (ref 7–23)
CALCIUM SERPL-MCNC: 8.7 MG/DL — SIGNIFICANT CHANGE UP (ref 8.5–10.1)
CHLORIDE SERPL-SCNC: 107 MMOL/L — SIGNIFICANT CHANGE UP (ref 96–108)
CO2 SERPL-SCNC: 26 MMOL/L — SIGNIFICANT CHANGE UP (ref 22–31)
CREAT SERPL-MCNC: 2.19 MG/DL — HIGH (ref 0.5–1.3)
EGFR: 31 ML/MIN/1.73M2 — LOW
EOSINOPHIL # BLD AUTO: 0.23 K/UL — SIGNIFICANT CHANGE UP (ref 0–0.5)
EOSINOPHIL NFR BLD AUTO: 3.2 % — SIGNIFICANT CHANGE UP (ref 0–6)
GLUCOSE SERPL-MCNC: 94 MG/DL — SIGNIFICANT CHANGE UP (ref 70–99)
HCT VFR BLD CALC: 43.6 % — SIGNIFICANT CHANGE UP (ref 39–50)
HGB BLD-MCNC: 14.4 G/DL — SIGNIFICANT CHANGE UP (ref 13–17)
IMM GRANULOCYTES NFR BLD AUTO: 0.3 % — SIGNIFICANT CHANGE UP (ref 0–0.9)
LYMPHOCYTES # BLD AUTO: 2.6 K/UL — SIGNIFICANT CHANGE UP (ref 1–3.3)
LYMPHOCYTES # BLD AUTO: 36.5 % — SIGNIFICANT CHANGE UP (ref 13–44)
MCHC RBC-ENTMCNC: 28.2 PG — SIGNIFICANT CHANGE UP (ref 27–34)
MCHC RBC-ENTMCNC: 33 G/DL — SIGNIFICANT CHANGE UP (ref 32–36)
MCV RBC AUTO: 85.5 FL — SIGNIFICANT CHANGE UP (ref 80–100)
MONOCYTES # BLD AUTO: 0.77 K/UL — SIGNIFICANT CHANGE UP (ref 0–0.9)
MONOCYTES NFR BLD AUTO: 10.8 % — SIGNIFICANT CHANGE UP (ref 2–14)
NEUTROPHILS # BLD AUTO: 3.46 K/UL — SIGNIFICANT CHANGE UP (ref 1.8–7.4)
NEUTROPHILS NFR BLD AUTO: 48.5 % — SIGNIFICANT CHANGE UP (ref 43–77)
NRBC # BLD: 0 /100 WBCS — SIGNIFICANT CHANGE UP (ref 0–0)
PLATELET # BLD AUTO: 263 K/UL — SIGNIFICANT CHANGE UP (ref 150–400)
POTASSIUM SERPL-MCNC: 5.3 MMOL/L — SIGNIFICANT CHANGE UP (ref 3.5–5.3)
POTASSIUM SERPL-SCNC: 5.3 MMOL/L — SIGNIFICANT CHANGE UP (ref 3.5–5.3)
PROT SERPL-MCNC: 7.7 GM/DL — SIGNIFICANT CHANGE UP (ref 6–8.3)
RAPID RVP RESULT: SIGNIFICANT CHANGE UP
RBC # BLD: 5.1 M/UL — SIGNIFICANT CHANGE UP (ref 4.2–5.8)
RBC # FLD: 14.5 % — SIGNIFICANT CHANGE UP (ref 10.3–14.5)
SARS-COV-2 RNA SPEC QL NAA+PROBE: SIGNIFICANT CHANGE UP
SODIUM SERPL-SCNC: 135 MMOL/L — SIGNIFICANT CHANGE UP (ref 135–145)
WBC # BLD: 7.13 K/UL — SIGNIFICANT CHANGE UP (ref 3.8–10.5)
WBC # FLD AUTO: 7.13 K/UL — SIGNIFICANT CHANGE UP (ref 3.8–10.5)

## 2023-09-08 PROCEDURE — 99221 1ST HOSP IP/OBS SF/LOW 40: CPT

## 2023-09-08 PROCEDURE — 99285 EMERGENCY DEPT VISIT HI MDM: CPT

## 2023-09-08 RX ORDER — ERTAPENEM SODIUM 1 G/1
1000 INJECTION, POWDER, LYOPHILIZED, FOR SOLUTION INTRAMUSCULAR; INTRAVENOUS ONCE
Refills: 0 | Status: COMPLETED | OUTPATIENT
Start: 2023-09-08 | End: 2023-09-08

## 2023-09-08 RX ORDER — ERTAPENEM SODIUM 1 G/1
1000 INJECTION, POWDER, LYOPHILIZED, FOR SOLUTION INTRAMUSCULAR; INTRAVENOUS EVERY 24 HOURS
Refills: 0 | Status: DISCONTINUED | OUTPATIENT
Start: 2023-09-09 | End: 2023-09-11

## 2023-09-08 RX ORDER — ONDANSETRON 8 MG/1
4 TABLET, FILM COATED ORAL EVERY 8 HOURS
Refills: 0 | Status: DISCONTINUED | OUTPATIENT
Start: 2023-09-08 | End: 2023-09-11

## 2023-09-08 RX ORDER — HEPARIN SODIUM 5000 [USP'U]/ML
5000 INJECTION INTRAVENOUS; SUBCUTANEOUS EVERY 8 HOURS
Refills: 0 | Status: DISCONTINUED | OUTPATIENT
Start: 2023-09-08 | End: 2023-09-11

## 2023-09-08 RX ORDER — LANOLIN ALCOHOL/MO/W.PET/CERES
3 CREAM (GRAM) TOPICAL AT BEDTIME
Refills: 0 | Status: DISCONTINUED | OUTPATIENT
Start: 2023-09-08 | End: 2023-09-11

## 2023-09-08 RX ORDER — ACETAMINOPHEN 500 MG
650 TABLET ORAL EVERY 6 HOURS
Refills: 0 | Status: DISCONTINUED | OUTPATIENT
Start: 2023-09-08 | End: 2023-09-11

## 2023-09-08 RX ADMIN — HEPARIN SODIUM 5000 UNIT(S): 5000 INJECTION INTRAVENOUS; SUBCUTANEOUS at 19:35

## 2023-09-08 RX ADMIN — ERTAPENEM SODIUM 120 MILLIGRAM(S): 1 INJECTION, POWDER, LYOPHILIZED, FOR SOLUTION INTRAMUSCULAR; INTRAVENOUS at 17:23

## 2023-09-08 NOTE — ED PROVIDER NOTE - PHYSICAL EXAMINATION
General: Well appearing male in no acute distress  HEENT: Normocephalic, atraumatic. Moist mucous membranes. Oropharynx clear. No lymphadenopathy.  Eyes: No scleral icterus. EOMI. EMIGDIO.  Neck: Soft and supple. Full ROM without pain. No midline tenderness  Cardiac: Regular rate and regular rhythm. No murmurs, rubs, gallops. Peripheral pulses 2+ and symmetric.  Resp: Lungs CTAB. Speaking in full sentences. No wheezes, rales or rhonchi.  Abd: Soft, non-tender, non-distended. No guarding or rebound.  Back: Spine midline and non-tender. No CVA tenderness.    Skin: No rashes, abrasions, or lacerations. L arm PICC line in place  Neuro: AO x 3. Moves all extremities symmetrically. Motor strength and sensation grossly intact.

## 2023-09-08 NOTE — ED ADULT TRIAGE NOTE - CHIEF COMPLAINT QUOTE
Patient received phone call [facility] to return to hospital for antibiotic IV treatment for UTI. Pt noted with picc line to L upper arm.

## 2023-09-08 NOTE — ED PROVIDER NOTE - CONSIDERATION OF ADMISSION OBSERVATION
Consideration of Admission/Observation unable to admit directly to [facility]. will require admission for IV abx

## 2023-09-08 NOTE — ED PROVIDER NOTE - NS ED ROS FT
CALLED BILLING TO REQUEST REFUND ON HIS COPAY TOWARDS THE MRI THAT COULD NOT BE COMPLETED. S/W SARAH, SHE WILL PLACE THIS REQUEST INTO THE REFUND QUEUE FOR APPROVAL. COULD TAKE 1-2 WEEKS TO PROCESS.   
Caller: Victoriano Rowe    Relationship to patient: Self    Best call back number: [phone]    Patient is needing: PATIENT WAS RETURNING QUYEN'S CALL REGARDING HIS UPCOMING MRI.     PLEASE CALL AND ADVISE PATIENT.   
Returned patient call. He would like to move forward with an open MRI at [facility]. Referral updated. Copay was taken at time of MRI, would like charge to be reversed. Will follow up with billing.   
General: Denies fever, chills  HEENT: Denies sensory changes, sore throat  Neck: Denies neck pain, neck stiffness  Resp: Denies coughing, SOB  Cardiovascular: Denies CP, palpitations, LE edema  GI: Denies nausea, vomiting, abdominal pain, diarrhea, constipation, blood in stool  : Denies dysuria, hematuria, frequency, incontinence  MSK: Denies back pain  Neuro: Denies HA, dizziness, numbness, weakness  Skin: Denies rashes.

## 2023-09-08 NOTE — H&P ADULT - ASSESSMENT
CKD stage IIIb: Cr 2.19 on admission, unclear baseline but lowest documented Cr 1.88 on 8/26/23, avoid nephrotoxins, renally dose meds, encourage PO hydration Claude Placide is a 73 year old male with PMHx of HTN who presented to the ED on 9/8/2023 he states he received a call from the hospital to return for IV antibiotics as it could not be arranged as an outpatient and admitted for ESBL E. coli and Enterobacter UTI.    ESBL E. coli and Enterobacter UTI  In pt with known staghorn calculi  Evaluated by ID during prior hospitalization, midline in place, started on ertapenem, to receive 10-day course as per ID, end date 9/15/23  Ertapenem continued  Advised continued outpatient urology f/u for discussion on PCNL/stone removal planning, has appt with Dr. Patterson next week      Chronic medical conditions:  HTN: PTA enalapril, consider switching to CCB if renal function continues to rise  CKD stage IIIb: Cr 2.19 on admission, unclear baseline but lowest documented Cr 1.88 on 8/26/23, avoid nephrotoxins, renally dose meds, encourage PO hydration

## 2023-09-08 NOTE — ED PROVIDER NOTE - OBJECTIVE STATEMENT
73 m presenting to the ED for admission to subacute rehab for IV antibiotic placement. Patient is currently on IV ertapenem for MDR E.coli UTI. He has PICC line in place and he was scheduled to continue his abx in rehab. Patient has no current complaints and he denies dysuria.

## 2023-09-08 NOTE — H&P ADULT - NSHPPHYSICALEXAM_GEN_ALL_CORE
T(C): 36.7 (09-08-23 @ 15:50), Max: 36.7 (09-08-23 @ 13:06)  HR: 74 (09-08-23 @ 15:50) (74 - 87)  BP: 144/83 (09-08-23 @ 15:50) (144/83 - 151/91)  RR: 17 (09-08-23 @ 15:50) (17 - 18)  SpO2: 98% (09-08-23 @ 15:50) (95% - 98%)    CONSTITUTIONAL: Well groomed, no apparent distress  EYES: PERRLA and symmetric  ENMT: Oral mucosa with moist membranes  RESP: No respiratory distress  CV: RRR  GI: Soft, NT, ND, no suprapubic tenderness appreciated  Skin: LUE midline with dressing C/D/I

## 2023-09-08 NOTE — ED ADULT NURSE NOTE - CHIEF COMPLAINT QUOTE
Patient received phone call [facility] to return to hospital for antibiotic IV treatment for UTI. Pt noted with picc line to L upper arm.

## 2023-09-08 NOTE — ED ADULT NURSE NOTE - ED STAT RN HANDOFF DETAILS
Handoff report given to RN Ana. Pt AOx4, respirations appear equal and unlabored, chest rise and fall noted. IV flushing without difficulty. Safety measures maintained.

## 2023-09-08 NOTE — H&P ADULT - NSHPLABSRESULTS_GEN_ALL_CORE
14.4   7.13  )-----------( 263      ( 08 Sep 2023 17:17 )             43.6   09-08    135  |  107  |  33<H>  ----------------------------<  94  5.3   |  26  |  2.19<H>    Ca    8.7      08 Sep 2023 17:17    TPro  7.7  /  Alb  3.1<L>  /  TBili  0.4  /  DBili  x   /  AST  41<H>  /  ALT  45  /  AlkPhos  51  09-08

## 2023-09-08 NOTE — ED PROVIDER NOTE - CLINICAL SUMMARY MEDICAL DECISION MAKING FREE TEXT BOX
73 M presenting to the ED for IV abx and MILADY placement    MDR E.coli from prior urine culture    labs  Ertapenem IV  medicine admit

## 2023-09-08 NOTE — H&P ADULT - HISTORY OF PRESENT ILLNESS
Claude Placide is a 73 year old male with PMHx of HTN who presented to the ED on 9/8/2023 because he states he received a call from the hospital to return for IV antibiotics.     Logan Creole language line , [employer] ID #[id number] utilized. Recent hospitalization from 9/5/23 - 9/7/23 for non-obstructing left staghorn calculus with concomitant acute cystitis.  CT renal revealed large staghorn type nonobstructing left intrarenal calculi and   a few additional punctate nonobstructing calculi. Found to have ESBL E. coli UTI. Started on ertapenem by ID, end date 9/15/23, and midline was placed. Evaluated by urology and patient to follow up outpatient with Dr. Patterson next week to discuss PCNL/stone removal planning. Patient reports his urinary frequency and malodorous urine has resolved but he still has dysuria.    In the ED, VSS except BP as elevated as 151/91. CBC unremarkable. Cr 2.19.  Claude Placide is a 73 year old male with PMHx of HTN who presented to the ED on 9/8/2023 because he states he received a call from the hospital to return for IV antibiotics as it could not be arranged as an outpatient.    [place] citizen Creole language line Lexis ID #[id number] utilized. Recent hospitalization from 9/5/23 - 9/7/23 for non-obstructing left staghorn calculus with concomitant acute cystitis. CT renal revealed large staghorn type nonobstructing left intrarenal calculi and a few additional punctate non-obstructing calculi. Found to have ESBL E. coli UTI. Started on ertapenem by ID, end date 9/15/23, and midline was placed. Evaluated by urology and patient to follow up outpatient with Dr. Patterson next week to discuss PCNL/stone removal planning. Patient reports his urinary frequency and malodorous urine has resolved but he still has dysuria. Patient does not want to go to rehab.    In the ED, VSS except BP as elevated as 151/91. CBC unremarkable. Cr 2.19. Received ertapenem.

## 2023-09-08 NOTE — ED ADULT NURSE NOTE - OBJECTIVE STATEMENT
Pt AOx4 and ambulatory with a cane. Pt states he was told to come back after being seen yesterday and was told to come back for further IV abx treatment of UTI. Pt denies any discomfort at this time but reports intermittent pain to L waist radiating to L back. Pt also reports intermittent N/V but denies at this time. Pt denies SOB, fever/chills, diarrhea, HA/dizziness, abdominal pain, or dysuria. PMH HTN. DM.

## 2023-09-08 NOTE — ED ADULT NURSE NOTE - NSFALLUNIVINTERV_ED_ALL_ED
Bed/Stretcher in lowest position, wheels locked, appropriate side rails in place/Call bell, personal items and telephone in reach/Instruct patient to call for assistance before getting out of bed/chair/stretcher/Non-slip footwear applied when patient is off stretcher/[place] to call system/Physically safe environment - no spills, clutter or unnecessary equipment/Purposeful proactive rounding/Room/bathroom lighting operational, light cord in reach

## 2023-09-08 NOTE — ED PROVIDER NOTE - CARE PLAN
Principal Discharge DX:	History of MDR Enterobacter cloacae infection  Secondary Diagnosis:	Staghorn calculus  Secondary Diagnosis:	E-coli UTI   1

## 2023-09-09 LAB
ALBUMIN SERPL ELPH-MCNC: 2.9 G/DL — LOW (ref 3.3–5)
ALP SERPL-CCNC: 46 U/L — SIGNIFICANT CHANGE UP (ref 40–120)
ALT FLD-CCNC: 51 U/L — SIGNIFICANT CHANGE UP (ref 12–78)
ANION GAP SERPL CALC-SCNC: 5 MMOL/L — SIGNIFICANT CHANGE UP (ref 5–17)
AST SERPL-CCNC: 42 U/L — HIGH (ref 15–37)
BILIRUB SERPL-MCNC: 0.5 MG/DL — SIGNIFICANT CHANGE UP (ref 0.2–1.2)
BUN SERPL-MCNC: 30 MG/DL — HIGH (ref 7–23)
CALCIUM SERPL-MCNC: 8.9 MG/DL — SIGNIFICANT CHANGE UP (ref 8.5–10.1)
CHLORIDE SERPL-SCNC: 107 MMOL/L — SIGNIFICANT CHANGE UP (ref 96–108)
CO2 SERPL-SCNC: 24 MMOL/L — SIGNIFICANT CHANGE UP (ref 22–31)
CREAT SERPL-MCNC: 1.82 MG/DL — HIGH (ref 0.5–1.3)
EGFR: 38 ML/MIN/1.73M2 — LOW
GLUCOSE SERPL-MCNC: 85 MG/DL — SIGNIFICANT CHANGE UP (ref 70–99)
POTASSIUM SERPL-MCNC: 4.8 MMOL/L — SIGNIFICANT CHANGE UP (ref 3.5–5.3)
POTASSIUM SERPL-SCNC: 4.8 MMOL/L — SIGNIFICANT CHANGE UP (ref 3.5–5.3)
PROT SERPL-MCNC: 7.6 GM/DL — SIGNIFICANT CHANGE UP (ref 6–8.3)
SODIUM SERPL-SCNC: 136 MMOL/L — SIGNIFICANT CHANGE UP (ref 135–145)

## 2023-09-09 PROCEDURE — 99232 SBSQ HOSP IP/OBS MODERATE 35: CPT

## 2023-09-09 RX ORDER — INFLUENZA VIRUS VACCINE 15; 15; 15; 15 UG/.5ML; UG/.5ML; UG/.5ML; UG/.5ML
0.7 SUSPENSION INTRAMUSCULAR ONCE
Refills: 0 | Status: DISCONTINUED | OUTPATIENT
Start: 2023-09-09 | End: 2023-09-11

## 2023-09-09 RX ORDER — METOPROLOL TARTRATE 50 MG
5 TABLET ORAL ONCE
Refills: 0 | Status: COMPLETED | OUTPATIENT
Start: 2023-09-09 | End: 2023-09-09

## 2023-09-09 RX ADMIN — Medication 20 MILLIGRAM(S): at 06:50

## 2023-09-09 RX ADMIN — HEPARIN SODIUM 5000 UNIT(S): 5000 INJECTION INTRAVENOUS; SUBCUTANEOUS at 13:49

## 2023-09-09 RX ADMIN — HEPARIN SODIUM 5000 UNIT(S): 5000 INJECTION INTRAVENOUS; SUBCUTANEOUS at 21:50

## 2023-09-09 RX ADMIN — ERTAPENEM SODIUM 120 MILLIGRAM(S): 1 INJECTION, POWDER, LYOPHILIZED, FOR SOLUTION INTRAMUSCULAR; INTRAVENOUS at 17:17

## 2023-09-09 RX ADMIN — Medication 5 MILLIGRAM(S): at 02:17

## 2023-09-09 RX ADMIN — HEPARIN SODIUM 5000 UNIT(S): 5000 INJECTION INTRAVENOUS; SUBCUTANEOUS at 06:50

## 2023-09-09 NOTE — PATIENT PROFILE ADULT - FUNCTIONAL ASSESSMENT - BASIC MOBILITY 6.
4-calculated by average/Not able to assess (calculate score using [facility] averaging method)

## 2023-09-09 NOTE — PATIENT PROFILE ADULT - FALL HARM RISK - HARM RISK INTERVENTIONS

## 2023-09-10 DIAGNOSIS — Z12.5 ENCOUNTER FOR SCREENING FOR MALIGNANT NEOPLASM OF PROSTATE: ICD-10-CM

## 2023-09-10 PROCEDURE — 99232 SBSQ HOSP IP/OBS MODERATE 35: CPT

## 2023-09-10 RX ORDER — LISINOPRIL 2.5 MG/1
20 TABLET ORAL ONCE
Refills: 0 | Status: DISCONTINUED | OUTPATIENT
Start: 2023-09-10 | End: 2023-09-10

## 2023-09-10 RX ADMIN — Medication 20 MILLIGRAM(S): at 05:20

## 2023-09-10 RX ADMIN — ERTAPENEM SODIUM 120 MILLIGRAM(S): 1 INJECTION, POWDER, LYOPHILIZED, FOR SOLUTION INTRAMUSCULAR; INTRAVENOUS at 17:40

## 2023-09-10 RX ADMIN — HEPARIN SODIUM 5000 UNIT(S): 5000 INJECTION INTRAVENOUS; SUBCUTANEOUS at 21:36

## 2023-09-10 RX ADMIN — HEPARIN SODIUM 5000 UNIT(S): 5000 INJECTION INTRAVENOUS; SUBCUTANEOUS at 13:37

## 2023-09-10 RX ADMIN — Medication 20 MILLIGRAM(S): at 15:04

## 2023-09-10 RX ADMIN — HEPARIN SODIUM 5000 UNIT(S): 5000 INJECTION INTRAVENOUS; SUBCUTANEOUS at 05:21

## 2023-09-10 NOTE — PROGRESS NOTE ADULT - SUBJECTIVE AND OBJECTIVE BOX
Patient is a 73y old  Male who presents with a chief complaint of ESBL UTI (09 Sep 2023 18:22)    Haitain Creole interpretor [id number]  INTERVAL HPI/OVERNIGHT EVENTS: no acute events   SUBJECTIVE: no fever/chills/nausea/vomiting. reports some left sided flank pain    MEDICATIONS  (STANDING):  enalapril 20 milliGRAM(s) Oral daily  ertapenem  IVPB 1000 milliGRAM(s) IV Intermittent every 24 hours  heparin   Injectable 5000 Unit(s) SubCutaneous every 8 hours  influenza  Vaccine (HIGH DOSE) 0.7 milliLiter(s) IntraMuscular once    MEDICATIONS  (PRN):  acetaminophen     Tablet .. 650 milliGRAM(s) Oral every 6 hours PRN Temp greater or equal to 38C (100.4F), Mild Pain (1 - 3)  aluminum hydroxide/magnesium hydroxide/simethicone Suspension 30 milliLiter(s) Oral every 4 hours PRN Dyspepsia  melatonin 3 milliGRAM(s) Oral at bedtime PRN Insomnia  ondansetron Injectable 4 milliGRAM(s) IV Push every 8 hours PRN Nausea and/or Vomiting    Allergies    No Known Allergies    Intolerances      REVIEW OF SYSTEMS:  All other systems reviewed and are negative    Vital Signs Last 24 Hrs  T(C): 36.6 (10 Sep 2023 12:16), Max: 36.7 (10 Sep 2023 05:03)  T(F): 97.9 (10 Sep 2023 12:16), Max: 98 (10 Sep 2023 05:03)  HR: 80 (10 Sep 2023 12:16) (76 - 97)  BP: 178/99 (10 Sep 2023 12:16) (163/96 - 178/99)  BP(mean): 125 (10 Sep 2023 07:40) (125 - 125)  RR: 18 (10 Sep 2023 12:16) (18 - 18)  SpO2: 99% (10 Sep 2023 12:16) (97% - 99%)    Parameters below as of 10 Sep 2023 07:40  Patient On (Oxygen Delivery Method): room air      Daily     Daily Weight in k.3 (10 Sep 2023 05:03)  I&O's Summary    09 Sep 2023 07:01  -  10 Sep 2023 07:00  --------------------------------------------------------  IN: 380 mL / OUT: 1600 mL / NET: -1220 mL      CAPILLARY BLOOD GLUCOSE        PHYSICAL EXAM:  GENERAL: NAD, well-groomed, well-developed  HEAD:  Atraumatic, Normocephalic  NECK: Supple, No JVD, Normal thyroid  NERVOUS SYSTEM:  Alert & Oriented X4  CHEST/LUNG: Clear to percussion bilaterally; No rales, rhonchi, wheezing, or rubs  HEART: Regular rate and rhythm; No murmurs, rubs, or gallops  ABDOMEN: Soft, Nontender, Nondistended; Bowel sounds present  EXTREMITIES:  2+ Peripheral Pulses, No clubbing, cyanosis, or edema  Lines: midline    Labs                          14.4   7.13  )-----------( 263      ( 08 Sep 2023 17:17 )             43.6         136  |  107  |  30<H>  ----------------------------<  85  4.8   |  24  |  1.82<H>    Ca    8.9      09 Sep 2023 06:00    TPro  7.6  /  Alb  2.9<L>  /  TBili  0.5  /  DBili  x   /  AST  42<H>  /  ALT  51  /  AlkPhos  46            Urinalysis Basic - ( 09 Sep 2023 06:00 )    Color: x / Appearance: x / SG: x / pH: x  Gluc: 85 mg/dL / Ketone: x  / Bili: x / Urobili: x   Blood: x / Protein: x / Nitrite: x   Leuk Esterase: x / RBC: x / WBC x   Sq Epi: x / Non Sq Epi: x / Bacteria: x        Culture - Blood (collected 08 Sep 2023 17:17)  Source: .Blood Blood  Preliminary Report (09 Sep 2023 23:02):    No growth at 24 hours    Culture - Blood (collected 08 Sep 2023 17:03)  Source: .Blood Blood-Venous  Preliminary Report (09 Sep 2023 23:02):    No growth at 24 hours            
Patient is a 73y old  Male who presents with a chief complaint of ESBL UTI (08 Sep 2023 20:38)    Derick Ortiz interpretor [id number]  INTERVAL HPI/OVERNIGHT EVENTS: no acute events  SUBJECTIVE: no fever/chills    MEDICATIONS  (STANDING):  enalapril 20 milliGRAM(s) Oral daily  ertapenem  IVPB 1000 milliGRAM(s) IV Intermittent every 24 hours  heparin   Injectable 5000 Unit(s) SubCutaneous every 8 hours  influenza  Vaccine (HIGH DOSE) 0.7 milliLiter(s) IntraMuscular once    MEDICATIONS  (PRN):  acetaminophen     Tablet .. 650 milliGRAM(s) Oral every 6 hours PRN Temp greater or equal to 38C (100.4F), Mild Pain (1 - 3)  aluminum hydroxide/magnesium hydroxide/simethicone Suspension 30 milliLiter(s) Oral every 4 hours PRN Dyspepsia  melatonin 3 milliGRAM(s) Oral at bedtime PRN Insomnia  ondansetron Injectable 4 milliGRAM(s) IV Push every 8 hours PRN Nausea and/or Vomiting    Allergies    No Known Allergies    Intolerances      REVIEW OF SYSTEMS:  All other systems reviewed and are negative    Vital Signs Last 24 Hrs  T(C): 36.6 (09 Sep 2023 17:29), Max: 36.7 (09 Sep 2023 04:49)  T(F): 97.8 (09 Sep 2023 17:29), Max: 98.1 (09 Sep 2023 11:25)  HR: 97 (09 Sep 2023 17:29) (68 - 97)  BP: 163/96 (09 Sep 2023 17:29) (163/96 - 194/119)  BP(mean): --  RR: 18 (09 Sep 2023 17:29) (17 - 18)  SpO2: 97% (09 Sep 2023 17:29) (96% - 98%)    Parameters below as of 09 Sep 2023 06:22  Patient On (Oxygen Delivery Method): room air      Daily     Daily Weight in k.3 (09 Sep 2023 04:49)  I&O's Summary    08 Sep 2023 07:01  -  09 Sep 2023 07:00  --------------------------------------------------------  IN: 0 mL / OUT: 800 mL / NET: -800 mL      CAPILLARY BLOOD GLUCOSE        PHYSICAL EXAM:  GENERAL: NAD, well-groomed, well-developed. comfortable  HEAD:  Atraumatic, Normocephalic  NECK: Supple, No JVD, Normal thyroid  NERVOUS SYSTEM:  Alert & Oriented X4 good concentration, moves all extremities sponatenously   CHEST/LUNG: Clear to percussion bilaterally; No rales, rhonchi, wheezing, or rubs  HEART: Regular rate and rhythm; No murmurs, rubs, or gallops  ABDOMEN: Soft, Nontender, Nondistended; Bowel sounds present  EXTREMITIES:  no cyanosis, no edema  Lines: left sided midline    Labs                          14.4   7.13  )-----------( 263      ( 08 Sep 2023 17:17 )             43.6         136  |  107  |  30<H>  ----------------------------<  85  4.8   |  24  |  1.82<H>    Ca    8.9      09 Sep 2023 06:00    TPro  7.6  /  Alb  2.9<L>  /  TBili  0.5  /  DBili  x   /  AST  42<H>  /  ALT  51  /  AlkPhos  46            Urinalysis Basic - ( 09 Sep 2023 06:00 )    Color: x / Appearance: x / SG: x / pH: x  Gluc: 85 mg/dL / Ketone: x  / Bili: x / Urobili: x   Blood: x / Protein: x / Nitrite: x   Leuk Esterase: x / RBC: x / WBC x   Sq Epi: x / Non Sq Epi: x / Bacteria: x

## 2023-09-10 NOTE — PROGRESS NOTE ADULT - ASSESSMENT
74 yo haitan speaking male w/ pmhx of HTN, nephrolithaisis, previous hx of ESBL ecoli and enterobacter and DC home with midline IV abx readmitted to [facility] for complicated UTI and inability to setup outpt abx?    INFECTIOUS DISEASE  # complicated UTI  # ESBL Ecoli/e enterobacter UTI  - sp midline with last day of abx on 9/15    UROLOGY  # hx of nephrolithiasis  - outpt UROLOGY follow up    CARDIOLOGY  # HTN:   - enalpril    NEPHROLOGY  # CKD stage IIIb  - trend    PLAN  - c/w [facility]  - follow up with INFECTIOUS DISEASE  - consult social work  - contact precautions   - discharge planning 
74 yo haitan speaking male w/ pmhx of HTN, nephrolithaisis, previous hx of ESBL ecoli and enterobacter and DC home with midline IV abx readmitted to [facility] for complicated UTI and inability to setup outpt abx?    INFECTIOUS DISEASE  # complicated UTI  # ESBL Ecoli/e enterobacter UTI  - sp midline with last day of abx on 9/15    UROLOGY  # hx of nephrolithiasis  - outpt UROLOGY follow up    CARDIOLOGY  # HTN:   - enalpril    NEPHROLOGY  # CKD stage IIIb  - trend    PLAN  - c/w F  - increase lisinopril to 40 mg po daily  - follow up w/ INFECTIOUS DISEASE  - consult social work  - contact precautions   - consult UROLOGY for evaluation of stone at pt request

## 2023-09-11 ENCOUNTER — TRANSCRIPTION ENCOUNTER (OUTPATIENT)
Age: 73
End: 2023-09-11

## 2023-09-11 VITALS
OXYGEN SATURATION: 97 % | HEART RATE: 78 BPM | DIASTOLIC BLOOD PRESSURE: 82 MMHG | RESPIRATION RATE: 16 BRPM | SYSTOLIC BLOOD PRESSURE: 154 MMHG | TEMPERATURE: 98 F

## 2023-09-11 LAB
-  AMIKACIN: SIGNIFICANT CHANGE UP
-  AMOXICILLIN/CLAVULANIC ACID: SIGNIFICANT CHANGE UP
-  AMPICILLIN/SULBACTAM: SIGNIFICANT CHANGE UP
-  AMPICILLIN: SIGNIFICANT CHANGE UP
-  AZTREONAM: SIGNIFICANT CHANGE UP
-  CEFAZOLIN: SIGNIFICANT CHANGE UP
-  CEFEPIME: SIGNIFICANT CHANGE UP
-  CEFTRIAXONE: SIGNIFICANT CHANGE UP
-  CEFUROXIME: SIGNIFICANT CHANGE UP
-  CIPROFLOXACIN: SIGNIFICANT CHANGE UP
-  ERTAPENEM: SIGNIFICANT CHANGE UP
-  GENTAMICIN: SIGNIFICANT CHANGE UP
-  IMIPENEM: SIGNIFICANT CHANGE UP
-  LEVOFLOXACIN: SIGNIFICANT CHANGE UP
-  MEROPENEM: SIGNIFICANT CHANGE UP
-  NITROFURANTOIN: SIGNIFICANT CHANGE UP
-  PIPERACILLIN/TAZOBACTAM: SIGNIFICANT CHANGE UP
-  TOBRAMYCIN: SIGNIFICANT CHANGE UP
-  TRIMETHOPRIM/SULFAMETHOXAZOLE: SIGNIFICANT CHANGE UP
CULTURE RESULTS: SIGNIFICANT CHANGE UP
FLUAV AG NPH QL: SIGNIFICANT CHANGE UP
FLUBV AG NPH QL: SIGNIFICANT CHANGE UP
METHOD TYPE: SIGNIFICANT CHANGE UP
ORGANISM # SPEC MICROSCOPIC CNT: SIGNIFICANT CHANGE UP
ORGANISM # SPEC MICROSCOPIC CNT: SIGNIFICANT CHANGE UP
SARS-COV-2 RNA SPEC QL NAA+PROBE: SIGNIFICANT CHANGE UP
SPECIMEN SOURCE: SIGNIFICANT CHANGE UP

## 2023-09-11 PROCEDURE — 99239 HOSP IP/OBS DSCHRG MGMT >30: CPT

## 2023-09-11 RX ORDER — AMLODIPINE BESYLATE 2.5 MG/1
5 TABLET ORAL DAILY
Refills: 0 | Status: DISCONTINUED | OUTPATIENT
Start: 2023-09-11 | End: 2023-09-11

## 2023-09-11 RX ORDER — AMLODIPINE BESYLATE 2.5 MG/1
1 TABLET ORAL
Qty: 0 | Refills: 0 | DISCHARGE
Start: 2023-09-11

## 2023-09-11 RX ADMIN — Medication 40 MILLIGRAM(S): at 05:08

## 2023-09-11 RX ADMIN — HEPARIN SODIUM 5000 UNIT(S): 5000 INJECTION INTRAVENOUS; SUBCUTANEOUS at 05:08

## 2023-09-11 RX ADMIN — HEPARIN SODIUM 5000 UNIT(S): 5000 INJECTION INTRAVENOUS; SUBCUTANEOUS at 14:22

## 2023-09-11 NOTE — DISCHARGE NOTE NURSING/CASE MANAGEMENT/SOCIAL WORK - PATIENT PORTAL LINK FT
You can access the FollowMyHealth Patient Portal offered by [facility] by registering at the following website: [email or web address]. By joining [employer]’s FollowMyHealth portal, you will also be able to view your health information using other applications (apps) compatible with our system.

## 2023-09-11 NOTE — DISCHARGE NOTE NURSING/CASE MANAGEMENT/SOCIAL WORK - NSDCPEFALRISK_GEN_ALL_CORE
For information on Fall & Injury Prevention, visit: [email or web address] OR  [email or web address] OR  https://www.cdc.gov/steadi/patient.html

## 2023-09-11 NOTE — DISCHARGE NOTE PROVIDER - NSDCMRMEDTOKEN_GEN_ALL_CORE_FT
enalapril 20 mg oral tablet: 2 tab(s) orally once a day  ertapenem 1 g injection: 1 gram(s) intravenously once a day ertapenem 1 gram IV q 24 hrs - last dose 9/15/2023  one time lab work: cbc with diff, cmp - fax to Dr. Thompson at [phone]  follow up with Id in the office  remove midline upon completion of the course of abx  flushes   amLODIPine 5 mg oral tablet: 1 tab(s) orally once a day  enalapril 20 mg oral tablet: 2 tab(s) orally once a day  ertapenem 1 g injection: 1 gram(s) intravenously once a day ertapenem 1 gram IV q 24 hrs - last dose 9/15/2023  one time lab work: cbc with diff, cmp - fax to Dr. Thompson at [phone]  follow up with Id in the office  remove midline upon completion of the course of abx  flushes

## 2023-09-11 NOTE — DISCHARGE NOTE PROVIDER - NSDCCPCAREPLAN_GEN_ALL_CORE_FT
PRINCIPAL DISCHARGE DIAGNOSIS  Diagnosis: History of MDR Enterobacter cloacae infection  Assessment and Plan of Treatment:       SECONDARY DISCHARGE DIAGNOSES  Diagnosis: Staghorn calculus  Assessment and Plan of Treatment:     Diagnosis: E-coli UTI  Assessment and Plan of Treatment:

## 2023-09-11 NOTE — DISCHARGE NOTE PROVIDER - HOSPITAL COURSE
72 yo haitan speaking male w/ pmhx of HTN, nephrolithaisis, previous hx of ESBL ecoli and enterobacter and DC home with midline IV abx readmitted to [facility] for complicated UTI and inability to setup outpt abx, now discharge to [facility] for abx completion. Course c/b HTN, Enalapril increased to 40mg QD. Stable for discharge.

## 2023-09-11 NOTE — DISCHARGE NOTE PROVIDER - ATTENDING DISCHARGE PHYSICAL EXAMINATION:
VITALS:   T(C): 36.7 (09-11-23 @ 11:02), Max: 36.7 (09-10-23 @ 16:05)  HR: 84 (09-11-23 @ 11:02) (78 - 86)  BP: 157/99 (09-11-23 @ 11:02) (155/98 - 189/110)  RR: 18 (09-11-23 @ 11:02) (18 - 18)  SpO2: 95% (09-11-23 @ 11:02) (95% - 99%)    GENERAL: NAD, lying in bed comfortably  HEAD:  Atraumatic, Normocephalic  EYES: EOMI, PERRLA, conjunctiva and sclera clear  ENT: Moist mucous membranes  NECK: Supple, No JVD  CHEST/LUNG: Clear to auscultation bilaterally; No rales, rhonchi, wheezing, or rubs. Unlabored respirations  HEART: Regular rate and rhythm; No murmurs, rubs, or gallops  ABDOMEN: BSx4; Soft, nontender, nondistended  EXTREMITIES:  2+ Peripheral Pulses, brisk capillary refill. No clubbing, cyanosis, or edema  NERVOUS SYSTEM:  A&Ox3, no focal deficits   SKIN: No rashes or lesions  PSYCH: Normal affect, euthymic mood     used: [id number]

## 2023-09-11 NOTE — DISCHARGE NOTE PROVIDER - NSDCFUSCHEDAPPT_GEN_ALL_CORE_FT
Tom Patterson  [facility] Physician [facility]  UROLOGY [address]  Scheduled Appointment: 09/12/2023

## 2023-09-11 NOTE — DISCHARGE NOTE PROVIDER - CARE PROVIDER_API CALL
Tom Patterson  Urology  [address]  [place] [id number]  Phone: [phone]  Fax: [phone]  Follow Up Time: 2 weeks

## 2023-09-12 ENCOUNTER — APPOINTMENT (OUTPATIENT)
Dept: UROLOGY | Facility: CLINIC | Age: 73
End: 2023-09-12

## 2023-09-13 DIAGNOSIS — N17.9 ACUTE KIDNEY FAILURE, UNSPECIFIED: ICD-10-CM

## 2023-09-13 DIAGNOSIS — N20.0 CALCULUS OF KIDNEY: ICD-10-CM

## 2023-09-13 DIAGNOSIS — N30.00 ACUTE CYSTITIS WITHOUT HEMATURIA: ICD-10-CM

## 2023-09-13 DIAGNOSIS — E87.1 HYPO-OSMOLALITY AND HYPONATREMIA: ICD-10-CM

## 2023-09-13 DIAGNOSIS — B96.29 OTHER ESCHERICHIA COLI [E. COLI] AS THE CAUSE OF DISEASES CLASSIFIED ELSEWHERE: ICD-10-CM

## 2023-09-13 DIAGNOSIS — R10.9 UNSPECIFIED ABDOMINAL PAIN: ICD-10-CM

## 2023-09-13 DIAGNOSIS — Z16.12 EXTENDED SPECTRUM BETA LACTAMASE (ESBL) RESISTANCE: ICD-10-CM

## 2023-09-13 LAB
CULTURE RESULTS: SIGNIFICANT CHANGE UP
CULTURE RESULTS: SIGNIFICANT CHANGE UP
SPECIMEN SOURCE: SIGNIFICANT CHANGE UP
SPECIMEN SOURCE: SIGNIFICANT CHANGE UP

## 2023-09-18 DIAGNOSIS — B96.20 UNSPECIFIED ESCHERICHIA COLI [E. COLI] AS THE CAUSE OF DISEASES CLASSIFIED ELSEWHERE: ICD-10-CM

## 2023-09-18 DIAGNOSIS — N39.0 URINARY TRACT INFECTION, SITE NOT SPECIFIED: ICD-10-CM

## 2023-09-18 DIAGNOSIS — Z16.12 EXTENDED SPECTRUM BETA LACTAMASE (ESBL) RESISTANCE: ICD-10-CM

## 2023-09-18 DIAGNOSIS — N18.32 CHRONIC KIDNEY DISEASE, STAGE 3B: ICD-10-CM

## 2023-09-18 DIAGNOSIS — N20.0 CALCULUS OF KIDNEY: ICD-10-CM

## 2023-09-18 DIAGNOSIS — I12.9 HYPERTENSIVE CHRONIC KIDNEY DISEASE WITH STAGE 1 THROUGH STAGE 4 CHRONIC KIDNEY DISEASE, OR UNSPECIFIED CHRONIC KIDNEY DISEASE: ICD-10-CM

## 2023-09-20 PROBLEM — I10 ESSENTIAL (PRIMARY) HYPERTENSION: Chronic | Status: ACTIVE | Noted: 2023-09-08

## 2023-10-02 ENCOUNTER — APPOINTMENT (OUTPATIENT)
Dept: UROLOGY | Facility: CLINIC | Age: 73
End: 2023-10-02
Payer: MEDICARE

## 2023-10-02 VITALS
TEMPERATURE: 97.1 F | HEART RATE: 90 BPM | SYSTOLIC BLOOD PRESSURE: 154 MMHG | DIASTOLIC BLOOD PRESSURE: 93 MMHG | OXYGEN SATURATION: 98 % | WEIGHT: 230 LBS

## 2023-10-02 DIAGNOSIS — E11.9 TYPE 2 DIABETES MELLITUS W/OUT COMPLICATIONS: ICD-10-CM

## 2023-10-02 DIAGNOSIS — Z78.9 OTHER SPECIFIED HEALTH STATUS: ICD-10-CM

## 2023-10-02 DIAGNOSIS — I10 ESSENTIAL (PRIMARY) HYPERTENSION: ICD-10-CM

## 2023-10-02 DIAGNOSIS — Z84.1 FAMILY HISTORY OF DISORDERS OF KIDNEY AND URETER: ICD-10-CM

## 2023-10-02 PROCEDURE — 99212 OFFICE O/P EST SF 10 MIN: CPT | Mod: 57

## 2023-10-02 RX ORDER — METFORMIN HYDROCHLORIDE 625 MG/1
TABLET ORAL
Refills: 0 | Status: ACTIVE | COMMUNITY

## 2023-10-05 LAB
ALBUMIN SERPL ELPH-MCNC: 4.1 G/DL
ANION GAP SERPL CALC-SCNC: 15 MMOL/L
APPEARANCE: ABNORMAL
BACTERIA: ABNORMAL /HPF
BILIRUBIN URINE: NEGATIVE
BLOOD URINE: ABNORMAL
BUN SERPL-MCNC: 38 MG/DL
CALCIUM SERPL-MCNC: 8.9 MG/DL
CAST: 1 /LPF
CHLORIDE SERPL-SCNC: 103 MMOL/L
CO2 SERPL-SCNC: 15 MMOL/L
COLOR: YELLOW
CREAT SERPL-MCNC: 2.35 MG/DL
EGFR: 28 ML/MIN/1.73M2
EPITHELIAL CELLS: 1 /HPF
GLUCOSE QUALITATIVE U: NEGATIVE MG/DL
GLUCOSE SERPL-MCNC: 129 MG/DL
KETONES URINE: NEGATIVE MG/DL
LEUKOCYTE ESTERASE URINE: ABNORMAL
MICROSCOPIC-UA: NORMAL
NITRITE URINE: NEGATIVE
PH URINE: 5.5
PHOSPHATE SERPL-MCNC: 3.1 MG/DL
POTASSIUM SERPL-SCNC: 5.2 MMOL/L
PROTEIN URINE: 300 MG/DL
PSA SERPL-MCNC: 11.1 NG/ML
RED BLOOD CELLS URINE: 0 /HPF
REVIEW: NORMAL
SODIUM SERPL-SCNC: 133 MMOL/L
SPECIFIC GRAVITY URINE: 1.01
UROBILINOGEN URINE: 0.2 MG/DL
WBC CLUMPS: PRESENT
WHITE BLOOD CELLS URINE: 272 /HPF

## 2023-10-07 LAB — BACTERIA UR CULT: ABNORMAL

## 2023-10-20 ENCOUNTER — APPOINTMENT (OUTPATIENT)
Dept: CARDIOLOGY | Facility: CLINIC | Age: 73
End: 2023-10-20
Payer: MEDICARE

## 2023-10-20 VITALS — SYSTOLIC BLOOD PRESSURE: 125 MMHG | DIASTOLIC BLOOD PRESSURE: 75 MMHG

## 2023-10-20 VITALS
WEIGHT: 215 LBS | HEART RATE: 86 BPM | SYSTOLIC BLOOD PRESSURE: 161 MMHG | OXYGEN SATURATION: 97 % | DIASTOLIC BLOOD PRESSURE: 80 MMHG

## 2023-10-20 PROCEDURE — 99203 OFFICE O/P NEW LOW 30 MIN: CPT

## 2023-11-10 ENCOUNTER — APPOINTMENT (OUTPATIENT)
Dept: INTERNAL MEDICINE | Facility: CLINIC | Age: 73
End: 2023-11-10
Payer: MEDICARE

## 2023-11-10 VITALS
RESPIRATION RATE: 16 BRPM | HEART RATE: 90 BPM | BODY MASS INDEX: 29.26 KG/M2 | OXYGEN SATURATION: 98 % | DIASTOLIC BLOOD PRESSURE: 80 MMHG | SYSTOLIC BLOOD PRESSURE: 182 MMHG | WEIGHT: 216 LBS | HEIGHT: 72 IN

## 2023-11-10 VITALS — SYSTOLIC BLOOD PRESSURE: 140 MMHG | DIASTOLIC BLOOD PRESSURE: 90 MMHG

## 2023-11-10 DIAGNOSIS — Z01.818 ENCOUNTER FOR OTHER PREPROCEDURAL EXAMINATION: ICD-10-CM

## 2023-11-10 DIAGNOSIS — E66.3 OVERWEIGHT: ICD-10-CM

## 2023-11-10 DIAGNOSIS — R97.20 ELEVATED PROSTATE, SPECIFIC ANTIGEN [PSA]: ICD-10-CM

## 2023-11-10 DIAGNOSIS — Z78.9 OTHER SPECIFIED HEALTH STATUS: ICD-10-CM

## 2023-11-10 DIAGNOSIS — H53.8 OTHER VISUAL DISTURBANCES: ICD-10-CM

## 2023-11-10 PROCEDURE — 99214 OFFICE O/P EST MOD 30 MIN: CPT | Mod: 25

## 2023-11-10 RX ORDER — ENALAPRIL MALEATE 20 MG/1
20 TABLET ORAL
Qty: 60 | Refills: 5 | Status: ACTIVE | COMMUNITY
Start: 2023-11-10 | End: 1900-01-01

## 2023-11-10 RX ORDER — NITROFURANTOIN MACROCRYSTALS 100 MG/1
100 CAPSULE ORAL
Qty: 6 | Refills: 0 | Status: DISCONTINUED | COMMUNITY
Start: 2023-10-07 | End: 2023-11-10

## 2023-11-12 PROBLEM — E66.3 OVERWEIGHT: Status: ACTIVE | Noted: 2023-11-10

## 2023-11-12 PROBLEM — R97.20 ELEVATED PSA: Status: ACTIVE | Noted: 2023-10-05

## 2023-11-12 PROBLEM — H53.8 BLURRY VISION: Status: ACTIVE | Noted: 2023-11-10

## 2023-11-15 ENCOUNTER — OUTPATIENT (OUTPATIENT)
Dept: OUTPATIENT SERVICES | Facility: HOSPITAL | Age: 73
LOS: 1 days | End: 2023-11-15
Payer: MEDICARE

## 2023-11-15 VITALS
SYSTOLIC BLOOD PRESSURE: 148 MMHG | OXYGEN SATURATION: 98 % | TEMPERATURE: 99 F | WEIGHT: 216.05 LBS | DIASTOLIC BLOOD PRESSURE: 94 MMHG | HEART RATE: 76 BPM | HEIGHT: 65.75 IN | RESPIRATION RATE: 18 BRPM

## 2023-11-15 DIAGNOSIS — N20.0 CALCULUS OF KIDNEY: ICD-10-CM

## 2023-11-15 DIAGNOSIS — Z98.890 OTHER SPECIFIED POSTPROCEDURAL STATES: Chronic | ICD-10-CM

## 2023-11-15 DIAGNOSIS — Z01.818 ENCOUNTER FOR OTHER PREPROCEDURAL EXAMINATION: ICD-10-CM

## 2023-11-15 LAB
A1C WITH ESTIMATED AVERAGE GLUCOSE RESULT: 6.2 % — HIGH (ref 4–5.6)
A1C WITH ESTIMATED AVERAGE GLUCOSE RESULT: 6.2 % — HIGH (ref 4–5.6)
ANION GAP SERPL CALC-SCNC: 13 MMOL/L — SIGNIFICANT CHANGE UP (ref 5–17)
ANION GAP SERPL CALC-SCNC: 13 MMOL/L — SIGNIFICANT CHANGE UP (ref 5–17)
BLD GP AB SCN SERPL QL: NEGATIVE — SIGNIFICANT CHANGE UP
BLD GP AB SCN SERPL QL: NEGATIVE — SIGNIFICANT CHANGE UP
BUN SERPL-MCNC: 31 MG/DL — HIGH (ref 7–23)
BUN SERPL-MCNC: 31 MG/DL — HIGH (ref 7–23)
CALCIUM SERPL-MCNC: 9.5 MG/DL — SIGNIFICANT CHANGE UP (ref 8.4–10.5)
CALCIUM SERPL-MCNC: 9.5 MG/DL — SIGNIFICANT CHANGE UP (ref 8.4–10.5)
CHLORIDE SERPL-SCNC: 100 MMOL/L — SIGNIFICANT CHANGE UP (ref 96–108)
CHLORIDE SERPL-SCNC: 100 MMOL/L — SIGNIFICANT CHANGE UP (ref 96–108)
CO2 SERPL-SCNC: 23 MMOL/L — SIGNIFICANT CHANGE UP (ref 22–31)
CO2 SERPL-SCNC: 23 MMOL/L — SIGNIFICANT CHANGE UP (ref 22–31)
CREAT SERPL-MCNC: 1.93 MG/DL — HIGH (ref 0.5–1.3)
CREAT SERPL-MCNC: 1.93 MG/DL — HIGH (ref 0.5–1.3)
EGFR: 36 ML/MIN/1.73M2 — LOW
EGFR: 36 ML/MIN/1.73M2 — LOW
ESTIMATED AVERAGE GLUCOSE: 131 MG/DL — HIGH (ref 68–114)
ESTIMATED AVERAGE GLUCOSE: 131 MG/DL — HIGH (ref 68–114)
GLUCOSE SERPL-MCNC: 142 MG/DL — HIGH (ref 70–99)
GLUCOSE SERPL-MCNC: 142 MG/DL — HIGH (ref 70–99)
HCT VFR BLD CALC: 44.3 % — SIGNIFICANT CHANGE UP (ref 39–50)
HCT VFR BLD CALC: 44.3 % — SIGNIFICANT CHANGE UP (ref 39–50)
HGB BLD-MCNC: 14.6 G/DL — SIGNIFICANT CHANGE UP (ref 13–17)
HGB BLD-MCNC: 14.6 G/DL — SIGNIFICANT CHANGE UP (ref 13–17)
MCHC RBC-ENTMCNC: 27.9 PG — SIGNIFICANT CHANGE UP (ref 27–34)
MCHC RBC-ENTMCNC: 27.9 PG — SIGNIFICANT CHANGE UP (ref 27–34)
MCHC RBC-ENTMCNC: 33 GM/DL — SIGNIFICANT CHANGE UP (ref 32–36)
MCHC RBC-ENTMCNC: 33 GM/DL — SIGNIFICANT CHANGE UP (ref 32–36)
MCV RBC AUTO: 84.7 FL — SIGNIFICANT CHANGE UP (ref 80–100)
MCV RBC AUTO: 84.7 FL — SIGNIFICANT CHANGE UP (ref 80–100)
NRBC # BLD: 0 /100 WBCS — SIGNIFICANT CHANGE UP (ref 0–0)
NRBC # BLD: 0 /100 WBCS — SIGNIFICANT CHANGE UP (ref 0–0)
PLATELET # BLD AUTO: 302 K/UL — SIGNIFICANT CHANGE UP (ref 150–400)
PLATELET # BLD AUTO: 302 K/UL — SIGNIFICANT CHANGE UP (ref 150–400)
POTASSIUM SERPL-MCNC: 4.8 MMOL/L — SIGNIFICANT CHANGE UP (ref 3.5–5.3)
POTASSIUM SERPL-MCNC: 4.8 MMOL/L — SIGNIFICANT CHANGE UP (ref 3.5–5.3)
POTASSIUM SERPL-SCNC: 4.8 MMOL/L — SIGNIFICANT CHANGE UP (ref 3.5–5.3)
POTASSIUM SERPL-SCNC: 4.8 MMOL/L — SIGNIFICANT CHANGE UP (ref 3.5–5.3)
RBC # BLD: 5.23 M/UL — SIGNIFICANT CHANGE UP (ref 4.2–5.8)
RBC # BLD: 5.23 M/UL — SIGNIFICANT CHANGE UP (ref 4.2–5.8)
RBC # FLD: 14.1 % — SIGNIFICANT CHANGE UP (ref 10.3–14.5)
RBC # FLD: 14.1 % — SIGNIFICANT CHANGE UP (ref 10.3–14.5)
RH IG SCN BLD-IMP: POSITIVE — SIGNIFICANT CHANGE UP
RH IG SCN BLD-IMP: POSITIVE — SIGNIFICANT CHANGE UP
SODIUM SERPL-SCNC: 136 MMOL/L — SIGNIFICANT CHANGE UP (ref 135–145)
SODIUM SERPL-SCNC: 136 MMOL/L — SIGNIFICANT CHANGE UP (ref 135–145)
WBC # BLD: 7.54 K/UL — SIGNIFICANT CHANGE UP (ref 3.8–10.5)
WBC # BLD: 7.54 K/UL — SIGNIFICANT CHANGE UP (ref 3.8–10.5)
WBC # FLD AUTO: 7.54 K/UL — SIGNIFICANT CHANGE UP (ref 3.8–10.5)
WBC # FLD AUTO: 7.54 K/UL — SIGNIFICANT CHANGE UP (ref 3.8–10.5)

## 2023-11-15 PROCEDURE — 86850 RBC ANTIBODY SCREEN: CPT

## 2023-11-15 PROCEDURE — 83036 HEMOGLOBIN GLYCOSYLATED A1C: CPT

## 2023-11-15 PROCEDURE — 86900 BLOOD TYPING SEROLOGIC ABO: CPT

## 2023-11-15 PROCEDURE — G0463: CPT

## 2023-11-15 PROCEDURE — 87186 SC STD MICRODIL/AGAR DIL: CPT

## 2023-11-15 PROCEDURE — 87086 URINE CULTURE/COLONY COUNT: CPT

## 2023-11-15 PROCEDURE — 80048 BASIC METABOLIC PNL TOTAL CA: CPT

## 2023-11-15 PROCEDURE — 86901 BLOOD TYPING SEROLOGIC RH(D): CPT

## 2023-11-15 PROCEDURE — 85027 COMPLETE CBC AUTOMATED: CPT

## 2023-11-15 NOTE — H&P PST ADULT - OTHER CARE PROVIDERS
Dr. Li [phone] last seen: 10/20/23 Dr. Li [phone] last seen: 10/20/23 Dr. Li [phone] last seen: 10/20/23

## 2023-11-15 NOTE — H&P PST ADULT - ASSESSMENT
DASI Score: 5.72  DASI Activity: Pt states he goes for 30 min walks with cane 2-3 times per week, able to shower and dress self, able to go up one flight of stairs or walk 1-2 blocks with out difficulty  Loose or removable teeth: denies

## 2023-11-15 NOTE — H&P PST ADULT - PROBLEM SELECTOR PLAN 2
The patient had a positive culture on 11/18 from the [facility] which has not been notified and therefore not treated. No recent negative cultures. Patient to be considered as high risk, no indication to perform an urgent procedure TAVARES.  The patient is rescheduled in order to allow proper testing and treatment before the procedure. The patient had a positive culture on 11/18 from the [facility] which has not been notified and therefore not treated. No recent negative cultures. Patient to be considered as high risk, no indication to perform an urgent procedure TAVARES.  The patient is rescheduled in order to allow proper testing and treatment before the procedure. The patient had a positive culture on 11/18 from the [facility] which has not been notified and therefore not treated. No recent negative cultures. Patient to be considered as high risk, no indication to perform an urgent procedure TAVARES.  The patient is rescheduled in order to allow proper testing and treatment before the procedure.

## 2023-11-15 NOTE — H&P PST ADULT - HISTORY OF PRESENT ILLNESS
73 year old creole speaking male presents to PST with daughter prior to scheduled Left Percutaneous Nephrolithotripsy with Dr. Patterson on 12/6/23. PMhx OA, HTN, staghorn renal calculus, elevated PSA. PShx Left TKR. C/o left renal stone and left flank pain. Pt recently admitted at [facility] for ESBL ecoli and enterobacter bacteremia secondary to UTI from L staghorn calculi. Pt states he is feeling well, denies any hematuria or dysuria. Denies any chest pain, palpitations, SOB, N/V, fever or chills.      Of note: Pt travels to [facility] frequently does not have an established PCP in the [place] at this moment.         73 year old creole speaking male presents to PST with daughter prior to scheduled Left Percutaneous Nephrolithotripsy with Dr. Patterson on 12/6/23. PMhx OA, HTN, staghorn renal calculus, elevated PSA. PShx Left TKR. C/o left renal stone and left flank pain. Pt recently admitted at [facility] for ESBL ecoli and enterobacter bacteremia secondary to UTI from L staghorn calculi. Pt states he is feeling well, denies any hematuria or dysuria. Denies any chest pain, palpitations, SOB, N/V, fever or chills.      Of note: Pt travels to [facility] frequently does not have an established PCP in the [place] at this moment.         73 year old creole speaking male presents to PST with daughter prior to scheduled Left Percutaneous Nephrolithotripsy with Dr. Patterson on 12/6/23. PMhx OA, HTN, staghorn renal calculus, elevated PSA. PShx Left TKR. C/o left renal stone and left flank pain. Pt recently admitted at [facility] for ESBL ecoli and enterobacter bacteremia secondary to UTI from L staghorn calculi. Pt states he is feeling well, denies any hematuria or dysuria. Denies any chest pain, palpitations, SOB, N/V, fever or chills.      Of note: Pt travels to [facility] frequently does not have an established PCP in the [place] at this moment.

## 2023-11-15 NOTE — H&P PST ADULT - NSANTHGENDERRD_ENT_A_CORE
Kamila Brown   is seen today for a follow-up visit.     G gastro/NexGen/referral data is reviewed prior to office visit/consultation. Past medical history, medications, surgical history family history and social history are reviewed on this visit. We discussed recent colonoscopy that showed an adenoma. EGD H. pylori negative. Her Bravo study off meds was positive.
Yes

## 2023-11-15 NOTE — H&P PST ADULT - PRIMARY CARE PROVIDER
Dr. Lindsey [phone] last seen: 11/10/23 Dr. Lindsey [phone] last seen: 11/10/23 Dr. Lindsey [phone] last seen: 11/10/23

## 2023-11-15 NOTE — H&P PST ADULT - PROBLEM SELECTOR PLAN 1
Pt scheduled for Left Percutaneous Nephrolithotripsy with Dr. Patterson on 12/6/23.  Pre-op instructions given, all questions answered.  Surgical Soap given.  Labs: CBC, BMP, T&S, UC,    A1c ordered: pt denies diabetic hx, however past medical records indicate metformin prescription.

## 2023-11-15 NOTE — H&P PST ADULT - NSICDXPROCEDURE_GEN_ALL_CORE_FT
PROCEDURES:  Nephrolithotripsy, percutaneous, using holmium laser 15-Nov-2023 11:30:48  Essence Gonsales

## 2023-11-15 NOTE — H&P PST ADULT - NS SC CAGE ALCOHOL CUT DOWN
T(C): 36.4 (03-27-22 @ 21:02), Max: 37.2 (03-27-22 @ 12:05)  HR: 60 (03-27-22 @ 21:02) (60 - 142)  BP: 144/87 (03-27-22 @ 21:02) (115/76 - 150/79)  RR: 18 (03-27-22 @ 21:02) (18 - 20)  SpO2: 96% (03-27-22 @ 21:02) (96% - 100%)    General: NAD, comfortable  Eyes: no conjunctival erythema  ENT: MMM  Neck: Neck supple, No JVD  Respiratory: CTA bibasilar crackles, No wheezing, rales, rhonchi  CV: Tachycardia, no murmurs  Abdominal: Soft, NT, ND +BS  MSK: no focal weakness  Extremities: No edema, 2+ peripheral pulses  Neurology: A&Ox2, nonfocal, LOPEZ x 4  Skin: No Rashes, Hematoma, Ecchymosis  Psych: Calm and appropriate no

## 2023-11-16 ENCOUNTER — APPOINTMENT (OUTPATIENT)
Dept: UROLOGY | Facility: HOSPITAL | Age: 73
End: 2023-11-16

## 2023-11-18 LAB
-  AMOXICILLIN/CLAVULANIC ACID: SIGNIFICANT CHANGE UP
-  AMOXICILLIN/CLAVULANIC ACID: SIGNIFICANT CHANGE UP
-  AMPICILLIN/SULBACTAM: SIGNIFICANT CHANGE UP
-  AMPICILLIN/SULBACTAM: SIGNIFICANT CHANGE UP
-  AMPICILLIN: SIGNIFICANT CHANGE UP
-  AMPICILLIN: SIGNIFICANT CHANGE UP
-  AZTREONAM: SIGNIFICANT CHANGE UP
-  AZTREONAM: SIGNIFICANT CHANGE UP
-  CEFAZOLIN: SIGNIFICANT CHANGE UP
-  CEFAZOLIN: SIGNIFICANT CHANGE UP
-  CEFEPIME: SIGNIFICANT CHANGE UP
-  CEFEPIME: SIGNIFICANT CHANGE UP
-  CEFTRIAXONE: SIGNIFICANT CHANGE UP
-  CEFTRIAXONE: SIGNIFICANT CHANGE UP
-  CEFUROXIME: SIGNIFICANT CHANGE UP
-  CEFUROXIME: SIGNIFICANT CHANGE UP
-  CIPROFLOXACIN: SIGNIFICANT CHANGE UP
-  CIPROFLOXACIN: SIGNIFICANT CHANGE UP
-  ERTAPENEM: SIGNIFICANT CHANGE UP
-  ERTAPENEM: SIGNIFICANT CHANGE UP
-  GENTAMICIN: SIGNIFICANT CHANGE UP
-  GENTAMICIN: SIGNIFICANT CHANGE UP
-  IMIPENEM: SIGNIFICANT CHANGE UP
-  IMIPENEM: SIGNIFICANT CHANGE UP
-  LEVOFLOXACIN: SIGNIFICANT CHANGE UP
-  LEVOFLOXACIN: SIGNIFICANT CHANGE UP
-  MEROPENEM: SIGNIFICANT CHANGE UP
-  MEROPENEM: SIGNIFICANT CHANGE UP
-  NITROFURANTOIN: SIGNIFICANT CHANGE UP
-  NITROFURANTOIN: SIGNIFICANT CHANGE UP
-  PIPERACILLIN/TAZOBACTAM: SIGNIFICANT CHANGE UP
-  PIPERACILLIN/TAZOBACTAM: SIGNIFICANT CHANGE UP
-  TOBRAMYCIN: SIGNIFICANT CHANGE UP
-  TOBRAMYCIN: SIGNIFICANT CHANGE UP
-  TRIMETHOPRIM/SULFAMETHOXAZOLE: SIGNIFICANT CHANGE UP
-  TRIMETHOPRIM/SULFAMETHOXAZOLE: SIGNIFICANT CHANGE UP
CULTURE RESULTS: ABNORMAL
CULTURE RESULTS: ABNORMAL
METHOD TYPE: SIGNIFICANT CHANGE UP
METHOD TYPE: SIGNIFICANT CHANGE UP
ORGANISM # SPEC MICROSCOPIC CNT: ABNORMAL
SPECIMEN SOURCE: SIGNIFICANT CHANGE UP
SPECIMEN SOURCE: SIGNIFICANT CHANGE UP

## 2023-12-06 ENCOUNTER — LABORATORY RESULT (OUTPATIENT)
Age: 73
End: 2023-12-06

## 2023-12-07 LAB
APPEARANCE: ABNORMAL
BILIRUBIN URINE: NEGATIVE
BLOOD URINE: ABNORMAL
COLOR: YELLOW
GLUCOSE QUALITATIVE U: NEGATIVE MG/DL
KETONES URINE: NEGATIVE MG/DL
LEUKOCYTE ESTERASE URINE: ABNORMAL
NITRITE URINE: NEGATIVE
PH URINE: 5.5
PROTEIN URINE: 300 MG/DL
SPECIFIC GRAVITY URINE: 1.01
UROBILINOGEN URINE: 1 MG/DL

## 2023-12-10 LAB — BACTERIA UR CULT: ABNORMAL

## 2023-12-10 RX ORDER — SULFAMETHOXAZOLE AND TRIMETHOPRIM 800; 160 MG/1; MG/1
800-160 TABLET ORAL TWICE DAILY
Qty: 14 | Refills: 0 | Status: ACTIVE | COMMUNITY
Start: 2023-12-10 | End: 1900-01-01

## 2023-12-19 PROBLEM — M19.90 UNSPECIFIED OSTEOARTHRITIS, UNSPECIFIED SITE: Chronic | Status: ACTIVE | Noted: 2023-11-15

## 2023-12-19 PROBLEM — N20.0 CALCULUS OF KIDNEY: Chronic | Status: ACTIVE | Noted: 2023-11-15

## 2024-02-16 ENCOUNTER — OUTPATIENT (OUTPATIENT)
Dept: OUTPATIENT SERVICES | Facility: HOSPITAL | Age: 74
LOS: 1 days | End: 2024-02-16
Payer: COMMERCIAL

## 2024-02-16 VITALS
HEIGHT: 70.47 IN | SYSTOLIC BLOOD PRESSURE: 145 MMHG | RESPIRATION RATE: 17 BRPM | TEMPERATURE: 98 F | DIASTOLIC BLOOD PRESSURE: 96 MMHG | OXYGEN SATURATION: 99 % | HEART RATE: 84 BPM | WEIGHT: 227.08 LBS

## 2024-02-16 DIAGNOSIS — I10 ESSENTIAL (PRIMARY) HYPERTENSION: ICD-10-CM

## 2024-02-16 DIAGNOSIS — E11.9 TYPE 2 DIABETES MELLITUS WITHOUT COMPLICATIONS: ICD-10-CM

## 2024-02-16 DIAGNOSIS — N20.0 CALCULUS OF KIDNEY: ICD-10-CM

## 2024-02-16 DIAGNOSIS — Z01.818 ENCOUNTER FOR OTHER PREPROCEDURAL EXAMINATION: ICD-10-CM

## 2024-02-16 DIAGNOSIS — Z98.890 OTHER SPECIFIED POSTPROCEDURAL STATES: Chronic | ICD-10-CM

## 2024-02-16 PROCEDURE — 86900 BLOOD TYPING SEROLOGIC ABO: CPT

## 2024-02-16 PROCEDURE — 83036 HEMOGLOBIN GLYCOSYLATED A1C: CPT

## 2024-02-16 PROCEDURE — 87086 URINE CULTURE/COLONY COUNT: CPT

## 2024-02-16 PROCEDURE — 80048 BASIC METABOLIC PNL TOTAL CA: CPT

## 2024-02-16 PROCEDURE — G0463: CPT

## 2024-02-16 PROCEDURE — 86850 RBC ANTIBODY SCREEN: CPT

## 2024-02-16 PROCEDURE — T1013: CPT

## 2024-02-16 PROCEDURE — 87186 SC STD MICRODIL/AGAR DIL: CPT

## 2024-02-16 PROCEDURE — 85027 COMPLETE CBC AUTOMATED: CPT

## 2024-02-16 PROCEDURE — 86901 BLOOD TYPING SEROLOGIC RH(D): CPT

## 2024-02-16 RX ORDER — ASPIRIN/CALCIUM CARB/MAGNESIUM 324 MG
1 TABLET ORAL
Refills: 0 | DISCHARGE

## 2024-02-16 NOTE — H&P PST ADULT - ASSESSMENT
DASI: walks limited due to back pain Mets 6  Symptoms : Denies SOB, ADLER, palpitations  Airway : no airway abnormalities , denies prior anesthesia complications   Mallampati : 3  Denies loose teeth     Corneal abrasion risk : Denies     CAPRINI SCORE [CLOT]    AGE RELATED RISK FACTORS                                                       MOBILITY RELATED FACTORS  [ ] Age 41-60 years                                            (1 Point)                  [ ] Bed rest                                                        (1 Point)  [x ] Age: 61-74 years                                           (2 Points)                 [ ] Plaster cast                                                   (2 Points)  [ ] Age= 75 years                                              (3 Points)                 [ ] Bed bound for more than 72 hours                 (2 Points)    DISEASE RELATED RISK FACTORS                                               GENDER SPECIFIC FACTORS  [ ] Edema in the lower extremities                       (1 Point)                  [ ] Pregnancy                                                     (1 Point)  [ ] Varicose veins                                               (1 Point)                  [ ] Post-partum < 6 weeks                                   (1 Point)             [ x] BMI > 25 Kg/m2                                            (1 Point)                  [ ] Hormonal therapy  or oral contraception          (1 Point)                 [ ] Sepsis (in the previous month)                        (1 Point)                  [ ] History of pregnancy complications                 (1 point)  [ ] Pneumonia or serious lung disease                                               [ ] Unexplained or recurrent                     (1 Point)           (in the previous month)                               (1 Point)  [ ] Abnormal pulmonary function test                     (1 Point)                 SURGERY RELATED RISK FACTORS  [ ] Acute myocardial infarction                              (1 Point)                 [ ]  Section                                             (1 Point)  [ ] Congestive heart failure (in the previous month)  (1 Point)               [ ] Minor surgery                                                  (1 Point)   [ ] Inflammatory bowel disease                             (1 Point)                 [ ] Arthroscopic surgery                                        (2 Points)  [ ] Central venous access                                      (2 Points)                [x ] General surgery lasting more than 45 minutes   (2 Points)       [ ] Stroke (in the previous month)                          (5 Points)               [ ] Elective arthroplasty                                         (5 Points)                                                                                                                                               HEMATOLOGY RELATED FACTORS                                                 TRAUMA RELATED RISK FACTORS  [ ] Prior episodes of VTE                                     (3 Points)                [ ] Fracture of the hip, pelvis, or leg                       (5 Points)  [ ] Positive family history for VTE                         (3 Points)                 [ ] Acute spinal cord injury (in the previous month)  (5 Points)  [ ] Prothrombin 88937 A                                     (3 Points)                 [ ] Paralysis  (less than 1 month)                             (5 Points)  [ ] Factor V Leiden                                             (3 Points)                  [ ] Multiple Trauma within 1 month                        (5 Points)  [ ] Lupus anticoagulants                                     (3 Points)                                                           [ ] Anticardiolipin antibodies                               (3 Points)                                                       [ ] High homocysteine in the blood                      (3 Points)                                             [ ] Other congenital or acquired thrombophilia      (3 Points)                                                [ ] Heparin induced thrombocytopenia                  (3 Points)                                          Total Score [        5  ]    Caprini Score 0 - 2:  Low Risk, No VTE Prophylaxis required for most patients, encourage ambulation  Caprini Score 3 - 6:  At Risk, pharmacologic VTE prophylaxis is indicated for most patients (in the absence of a contraindication)  Caprini Score Greater than or = 7:  High Risk, pharmacologic VTE prophylaxis is indicated for most patients (in the absence of a contraindication)

## 2024-02-16 NOTE — H&P PST ADULT - HISTORY OF PRESENT ILLNESS
Logan Ortiz used [employer] I Pad    73 yr old male who speaks Afghan Creole (used [employer] I Pad  ) hx of Benign Hypertension, Diabetes Mellitus Type2 went to ER with left flank pain dx with stone for procedure.    HTN : on Enalapril pt unclear as to dose stated 1tab daily then stated 2 tabs daily ,Instructed to see cardiologist Dr. Wong Li  [phone]) for evaluation pre op obtain note  Diabetes Mellitus type2 per pt takes Metformin when "he feels like it" does not check blood sugar  instructed last dose 3/5

## 2024-02-16 NOTE — H&P PST ADULT - NSICDXPASTMEDICALHX_GEN_ALL_CORE_FT
PAST MEDICAL HISTORY:  Arthrosis     Botswanan Creole  needed     HTN (hypertension)     Language barrier to communication     Poor historian     Renal stone     Type 2 diabetes mellitus      PAST MEDICAL HISTORY:  Arthrosis     North Korean Creole  needed     HTN (hypertension)     Language barrier to communication     Poor compliance with medication     Poor historian     Renal stone     Type 2 diabetes mellitus

## 2024-02-17 PROBLEM — Z91.148 PATIENT'S OTHER NONCOMPLIANCE WITH MEDICATION REGIMEN FOR OTHER REASON: Chronic | Status: ACTIVE | Noted: 2024-02-16

## 2024-02-17 PROBLEM — Z78.9 OTHER SPECIFIED HEALTH STATUS: Chronic | Status: ACTIVE | Noted: 2024-02-16

## 2024-02-17 PROBLEM — E11.9 TYPE 2 DIABETES MELLITUS WITHOUT COMPLICATIONS: Chronic | Status: ACTIVE | Noted: 2024-02-16

## 2024-02-17 PROBLEM — Z75.8 OTHER PROBLEMS RELATED TO MEDICAL FACILITIES AND OTHER HEALTH CARE: Chronic | Status: ACTIVE | Noted: 2024-02-16

## 2024-02-19 ENCOUNTER — APPOINTMENT (OUTPATIENT)
Dept: CARDIOLOGY | Facility: CLINIC | Age: 74
End: 2024-02-19
Payer: MEDICARE

## 2024-02-19 ENCOUNTER — NON-APPOINTMENT (OUTPATIENT)
Age: 74
End: 2024-02-19

## 2024-02-19 VITALS
SYSTOLIC BLOOD PRESSURE: 163 MMHG | OXYGEN SATURATION: 98 % | DIASTOLIC BLOOD PRESSURE: 110 MMHG | HEART RATE: 82 BPM | BODY MASS INDEX: 30.48 KG/M2 | WEIGHT: 225 LBS | HEIGHT: 72 IN

## 2024-02-19 VITALS — SYSTOLIC BLOOD PRESSURE: 132 MMHG | DIASTOLIC BLOOD PRESSURE: 80 MMHG

## 2024-02-19 DIAGNOSIS — I10 ESSENTIAL (PRIMARY) HYPERTENSION: ICD-10-CM

## 2024-02-19 DIAGNOSIS — Z01.810 ENCOUNTER FOR PREPROCEDURAL CARDIOVASCULAR EXAMINATION: ICD-10-CM

## 2024-02-19 PROCEDURE — 99213 OFFICE O/P EST LOW 20 MIN: CPT | Mod: 25

## 2024-02-19 PROCEDURE — 93000 ELECTROCARDIOGRAM COMPLETE: CPT

## 2024-02-19 NOTE — HISTORY OF PRESENT ILLNESS
[FreeTextEntry1] : 73M with HTN who presents for cardiac eval prior to  surgery  Last seen in Oct 2023 prior to percutaneous nephrolithotomy  Surgery not yet done, now pending for early March Patient denies chest pain, shortness of breath, palpitations, dizziness, lightheadedness, syncope. Able to climb stairs without difficulty  Last crt 2.3  HISTORY:  Oct 2023- Pt recently admitted to [facility] with E-Coli bacteremia secondary to UTI from L staghorn calculi Pending percutaneous nephrolithotomy  Recently came from [facility], he does not have a PCP While in hospital he was started on an antihypertensive, there is a question whether that medicine is enalapril or amlodipine  Nonsmoker  ECG: SR, no ST-T wave changes (from hospital)  Enalapril 40mg Metformin

## 2024-02-19 NOTE — DISCUSSION/SUMMARY
[FreeTextEntry1] : Pending low risk  surgery for staghorn calculi BP improved on recheck- cont enalapril, watch crt closely No active cardiac symptoms   This patient is able to perform >4 METS of activity without limitation. No evidence of ongoing ischemia, arrhythmia, valvular heart disease or decompensated heart failure.  This patient is optimized from a cardiac standpoint for this low risk surgery.  No further cardiac testing is necessary prior to surgery.  Needs to see PCP. Would stop metformin in the setting of CKD [EKG obtained to assist in diagnosis and management of assessed problem(s)] : EKG obtained to assist in diagnosis and management of assessed problem(s)

## 2024-02-26 ENCOUNTER — APPOINTMENT (OUTPATIENT)
Dept: UROLOGY | Facility: CLINIC | Age: 74
End: 2024-02-26
Payer: MEDICARE

## 2024-02-26 VITALS
SYSTOLIC BLOOD PRESSURE: 165 MMHG | HEART RATE: 86 BPM | DIASTOLIC BLOOD PRESSURE: 94 MMHG | OXYGEN SATURATION: 96 % | TEMPERATURE: 94.6 F

## 2024-02-26 DIAGNOSIS — N39.0 URINARY TRACT INFECTION, SITE NOT SPECIFIED: ICD-10-CM

## 2024-02-26 PROCEDURE — 99213 OFFICE O/P EST LOW 20 MIN: CPT

## 2024-02-26 RX ORDER — AMOXICILLIN AND CLAVULANATE POTASSIUM 875; 125 MG/1; MG/1
875-125 TABLET, COATED ORAL
Qty: 12 | Refills: 0 | Status: ACTIVE | COMMUNITY
Start: 2024-02-26 | End: 1900-01-01

## 2024-02-26 NOTE — REVIEW OF SYSTEMS
[Urine Infection (bladder/kidney)] : bladder/kidney infection [History of kidney stones] : history of kidney stones [Wake up at night to urinate  How many times?  ___] : wakes up to urinate [unfilled] times during the night [Negative] : Heme/Lymph

## 2024-02-29 LAB
APPEARANCE: ABNORMAL
BACTERIA UR CULT: ABNORMAL
BACTERIA: ABNORMAL /HPF
BILIRUBIN URINE: NEGATIVE
BLOOD URINE: ABNORMAL
CAST: 0 /LPF
COLOR: YELLOW
EPITHELIAL CELLS: 0 /HPF
GLUCOSE QUALITATIVE U: NEGATIVE MG/DL
KETONES URINE: NEGATIVE MG/DL
LEUKOCYTE ESTERASE URINE: ABNORMAL
MICROSCOPIC-UA: NORMAL
NITRITE URINE: NEGATIVE
PH URINE: 6.5
PROTEIN URINE: 300 MG/DL
RED BLOOD CELLS URINE: 2 /HPF
SPECIFIC GRAVITY URINE: 1.01
UROBILINOGEN URINE: 0.2 MG/DL
WHITE BLOOD CELLS URINE: 107 /HPF

## 2024-02-29 RX ORDER — ERTAPENEM SODIUM 1 G/1
1 INJECTION, POWDER, LYOPHILIZED, FOR SOLUTION INTRAMUSCULAR; INTRAVENOUS DAILY
Qty: 0.5 | Refills: 0 | Status: ACTIVE | COMMUNITY
Start: 2024-02-29 | End: 1900-01-01

## 2024-02-29 NOTE — ADDENDUM
[FreeTextEntry1] : Last UCX showed >100,000 CFU/ml Escherichia coli ESBL.  Patient will start IV ABX at home for 5 days, to begin on 3/01/2024, until 3/05/2024. Patient made aware.

## 2024-02-29 NOTE — HISTORY OF PRESENT ILLNESS
[FreeTextEntry1] : 72 y/o male with left staghorn kidney stone, involving the lower calices. The patient still refers left flank pain. Was already booked for surgery but the procedure was postponed twice due non treated to positive culture. Denies fever TAVARES. Comes to office for UCX.

## 2024-02-29 NOTE — REASON FOR VISIT
[[employer] ] : provided by [employer]   [Interpreters_IDNumber] : [id number] [Interpreters_FullName] : marnie [TWNoteComboBox1] : Diana

## 2024-02-29 NOTE — PHYSICAL EXAM
[General Appearance - Well Developed] : well developed [General Appearance - Well Nourished] : well nourished [Normal Appearance] : normal appearance [Well Groomed] : well groomed [General Appearance - In No Acute Distress] : no acute distress [Respiration, Rhythm And Depth] : normal respiratory rhythm and effort [Edema] : no peripheral edema [Abdomen Soft] : soft [Exaggerated Use Of Accessory Muscles For Inspiration] : no accessory muscle use [Costovertebral Angle Tenderness] : no ~M costovertebral angle tenderness [Abdomen Tenderness] : non-tender [Urethral Meatus] : meatus normal [Urinary Bladder Findings] : the bladder was normal on palpation [Scrotum] : the scrotum was normal [Testes Mass (___cm)] : there were no testicular masses [Normal Station and Gait] : the gait and station were normal for the patient's age [No Prostate Nodules] : no prostate nodules [No Focal Deficits] : no focal deficits [] : no rash [Oriented To Time, Place, And Person] : oriented to person, place, and time [Affect] : the affect was normal [Not Anxious] : not anxious [Mood] : the mood was normal [No Palpable Adenopathy] : no palpable adenopathy [FreeTextEntry1] : Kidney percussion test positive on the left, no pain reported on bi-manual palpation bilaterally, no pain on superficial and deep palpation on the iliac fossa bilaterally and on the ureteral points

## 2024-02-29 NOTE — ASSESSMENT
[FreeTextEntry1] : 72 y/o male with left staghorn kidney stone, involving the lower calices. The patient still refers left flank pain. Was already booked for surgery but the procedure was postponed twice due non treated to positive culture. Denies fever TAVARES. Comes to office for UCX.  Kidney percussion test positive on the left, no pain reported on bi-manual palpation bilaterally, no pain on superficial and deep palpation on the iliac fossa bilaterally and on the ureteral points   PCNL planned for March 6th. Will start ABX on wednesday 2/28. ABX will be updated based on the UCX.  Spoke with patient and advised him he will have ABX IV infusion in case new UCX is confirming the past results.

## 2024-03-04 RX ORDER — DIPHENHYDRAMINE HYDROCHLORIDE 50 MG/ML
50 INJECTION, SOLUTION INTRAMUSCULAR; INTRAVENOUS
Qty: 1 | Refills: 0 | Status: ACTIVE | COMMUNITY
Start: 2024-03-04 | End: 1900-01-01

## 2024-03-04 RX ORDER — DIPHENHYDRAMINE HYDROCHLORIDE 50 MG/ML
50 INJECTION, SOLUTION INTRAMUSCULAR; INTRAVENOUS
Qty: 1 | Refills: 0 | Status: DISCONTINUED | COMMUNITY
Start: 2024-03-04 | End: 2024-03-04

## 2024-03-06 ENCOUNTER — INPATIENT (INPATIENT)
Facility: HOSPITAL | Age: 74
LOS: 4 days | Discharge: HOME HEALTH | DRG: 660 | End: 2024-03-11
Attending: UROLOGY | Admitting: UROLOGY
Payer: COMMERCIAL

## 2024-03-06 ENCOUNTER — APPOINTMENT (OUTPATIENT)
Dept: UROLOGY | Facility: HOSPITAL | Age: 74
End: 2024-03-06

## 2024-03-06 ENCOUNTER — TRANSCRIPTION ENCOUNTER (OUTPATIENT)
Age: 74
End: 2024-03-06

## 2024-03-06 ENCOUNTER — RESULT REVIEW (OUTPATIENT)
Age: 74
End: 2024-03-06

## 2024-03-06 VITALS
DIASTOLIC BLOOD PRESSURE: 99 MMHG | TEMPERATURE: 98 F | HEART RATE: 98 BPM | HEIGHT: 70.47 IN | OXYGEN SATURATION: 99 % | WEIGHT: 227.08 LBS | SYSTOLIC BLOOD PRESSURE: 168 MMHG | RESPIRATION RATE: 18 BRPM

## 2024-03-06 DIAGNOSIS — Z79.2 LONG TERM (CURRENT) USE OF ANTIBIOTICS: ICD-10-CM

## 2024-03-06 DIAGNOSIS — Z98.890 OTHER SPECIFIED POSTPROCEDURAL STATES: Chronic | ICD-10-CM

## 2024-03-06 DIAGNOSIS — R82.71 BACTERIURIA: ICD-10-CM

## 2024-03-06 DIAGNOSIS — N20.0 CALCULUS OF KIDNEY: ICD-10-CM

## 2024-03-06 DIAGNOSIS — A49.8 OTHER BACTERIAL INFECTIONS OF UNSPECIFIED SITE: ICD-10-CM

## 2024-03-06 LAB
ANION GAP SERPL CALC-SCNC: 9 MMOL/L — SIGNIFICANT CHANGE UP (ref 5–17)
BASOPHILS # BLD AUTO: 0.02 K/UL — SIGNIFICANT CHANGE UP (ref 0–0.2)
BASOPHILS NFR BLD AUTO: 0.3 % — SIGNIFICANT CHANGE UP (ref 0–2)
BUN SERPL-MCNC: 25 MG/DL — HIGH (ref 7–23)
CALCIUM SERPL-MCNC: 8.5 MG/DL — SIGNIFICANT CHANGE UP (ref 8.4–10.5)
CHLORIDE SERPL-SCNC: 105 MMOL/L — SIGNIFICANT CHANGE UP (ref 96–108)
CO2 SERPL-SCNC: 23 MMOL/L — SIGNIFICANT CHANGE UP (ref 22–31)
CREAT SERPL-MCNC: 2.18 MG/DL — HIGH (ref 0.5–1.3)
EGFR: 31 ML/MIN/1.73M2 — LOW
EOSINOPHIL # BLD AUTO: 0.01 K/UL — SIGNIFICANT CHANGE UP (ref 0–0.5)
EOSINOPHIL NFR BLD AUTO: 0.1 % — SIGNIFICANT CHANGE UP (ref 0–6)
GLUCOSE BLDC GLUCOMTR-MCNC: 138 MG/DL — HIGH (ref 70–99)
GLUCOSE BLDC GLUCOMTR-MCNC: 175 MG/DL — HIGH (ref 70–99)
GLUCOSE BLDC GLUCOMTR-MCNC: 183 MG/DL — HIGH (ref 70–99)
GLUCOSE BLDC GLUCOMTR-MCNC: 204 MG/DL — HIGH (ref 70–99)
GLUCOSE SERPL-MCNC: 202 MG/DL — HIGH (ref 70–99)
HCT VFR BLD CALC: 41.2 % — SIGNIFICANT CHANGE UP (ref 39–50)
HGB BLD-MCNC: 13.9 G/DL — SIGNIFICANT CHANGE UP (ref 13–17)
IMM GRANULOCYTES NFR BLD AUTO: 0.5 % — SIGNIFICANT CHANGE UP (ref 0–0.9)
LYMPHOCYTES # BLD AUTO: 1.29 K/UL — SIGNIFICANT CHANGE UP (ref 1–3.3)
LYMPHOCYTES # BLD AUTO: 16.5 % — SIGNIFICANT CHANGE UP (ref 13–44)
MCHC RBC-ENTMCNC: 28.5 PG — SIGNIFICANT CHANGE UP (ref 27–34)
MCHC RBC-ENTMCNC: 33.7 GM/DL — SIGNIFICANT CHANGE UP (ref 32–36)
MCV RBC AUTO: 84.4 FL — SIGNIFICANT CHANGE UP (ref 80–100)
MONOCYTES # BLD AUTO: 0.27 K/UL — SIGNIFICANT CHANGE UP (ref 0–0.9)
MONOCYTES NFR BLD AUTO: 3.5 % — SIGNIFICANT CHANGE UP (ref 2–14)
NEUTROPHILS # BLD AUTO: 6.19 K/UL — SIGNIFICANT CHANGE UP (ref 1.8–7.4)
NEUTROPHILS NFR BLD AUTO: 79.1 % — HIGH (ref 43–77)
NRBC # BLD: 0 /100 WBCS — SIGNIFICANT CHANGE UP (ref 0–0)
PLATELET # BLD AUTO: 248 K/UL — SIGNIFICANT CHANGE UP (ref 150–400)
POTASSIUM SERPL-MCNC: 4.7 MMOL/L — SIGNIFICANT CHANGE UP (ref 3.5–5.3)
POTASSIUM SERPL-SCNC: 4.7 MMOL/L — SIGNIFICANT CHANGE UP (ref 3.5–5.3)
RBC # BLD: 4.88 M/UL — SIGNIFICANT CHANGE UP (ref 4.2–5.8)
RBC # FLD: 13.7 % — SIGNIFICANT CHANGE UP (ref 10.3–14.5)
SODIUM SERPL-SCNC: 137 MMOL/L — SIGNIFICANT CHANGE UP (ref 135–145)
WBC # BLD: 7.82 K/UL — SIGNIFICANT CHANGE UP (ref 3.8–10.5)
WBC # FLD AUTO: 7.82 K/UL — SIGNIFICANT CHANGE UP (ref 3.8–10.5)

## 2024-03-06 PROCEDURE — 50081 PERQ NL/PL LITHOTRP CPLX>2CM: CPT | Mod: LT

## 2024-03-06 PROCEDURE — 88300 SURGICAL PATH GROSS: CPT | Mod: 26

## 2024-03-06 PROCEDURE — 99222 1ST HOSP IP/OBS MODERATE 55: CPT

## 2024-03-06 PROCEDURE — 71045 X-RAY EXAM CHEST 1 VIEW: CPT | Mod: 26

## 2024-03-06 DEVICE — URETERAL CATH OPEN END 5FR 70CM: Type: IMPLANTABLE DEVICE | Site: LEFT | Status: FUNCTIONAL

## 2024-03-06 DEVICE — STENT URET PERCFLX 8X24 DBL J: Type: IMPLANTABLE DEVICE | Site: LEFT | Status: FUNCTIONAL

## 2024-03-06 DEVICE — GUIDEWIRE SENSOR DUAL-FLEX NITINOL STRAIGHT .035" X 150CM: Type: IMPLANTABLE DEVICE | Site: LEFT | Status: FUNCTIONAL

## 2024-03-06 DEVICE — GUIDEWIRE AMPLATZ SUPER-STIFF STRAIGHT .035" X 145CM: Type: IMPLANTABLE DEVICE | Site: LEFT | Status: FUNCTIONAL

## 2024-03-06 DEVICE — TRILOGY PROBE KIT 3.9MM X 440MM (BLUE): Type: IMPLANTABLE DEVICE | Site: LEFT | Status: FUNCTIONAL

## 2024-03-06 DEVICE — LASER FIBER SOLTIVE 550: Type: IMPLANTABLE DEVICE | Site: LEFT | Status: FUNCTIONAL

## 2024-03-06 DEVICE — STONE BASKET ZEROTIP NITINOL 4-WIRE 1.9FR 120CM X 12MM: Type: IMPLANTABLE DEVICE | Site: LEFT | Status: FUNCTIONAL

## 2024-03-06 RX ORDER — LIDOCAINE 4 G/100G
1 CREAM TOPICAL DAILY
Refills: 0 | Status: DISCONTINUED | OUTPATIENT
Start: 2024-03-06 | End: 2024-03-11

## 2024-03-06 RX ORDER — ONDANSETRON 8 MG/1
4 TABLET, FILM COATED ORAL ONCE
Refills: 0 | Status: DISCONTINUED | OUTPATIENT
Start: 2024-03-06 | End: 2024-03-06

## 2024-03-06 RX ORDER — SODIUM CHLORIDE 9 MG/ML
3 INJECTION INTRAMUSCULAR; INTRAVENOUS; SUBCUTANEOUS EVERY 8 HOURS
Refills: 0 | Status: DISCONTINUED | OUTPATIENT
Start: 2024-03-06 | End: 2024-03-06

## 2024-03-06 RX ORDER — HYDROMORPHONE HYDROCHLORIDE 2 MG/ML
0.25 INJECTION INTRAMUSCULAR; INTRAVENOUS; SUBCUTANEOUS
Refills: 0 | Status: DISCONTINUED | OUTPATIENT
Start: 2024-03-06 | End: 2024-03-06

## 2024-03-06 RX ORDER — MEROPENEM 1 G/30ML
500 INJECTION INTRAVENOUS EVERY 12 HOURS
Refills: 0 | Status: DISCONTINUED | OUTPATIENT
Start: 2024-03-07 | End: 2024-03-11

## 2024-03-06 RX ORDER — OXYCODONE HYDROCHLORIDE 5 MG/1
5 TABLET ORAL EVERY 6 HOURS
Refills: 0 | Status: DISCONTINUED | OUTPATIENT
Start: 2024-03-06 | End: 2024-03-11

## 2024-03-06 RX ORDER — LABETALOL HCL 100 MG
10 TABLET ORAL ONCE
Refills: 0 | Status: COMPLETED | OUTPATIENT
Start: 2024-03-06 | End: 2024-03-06

## 2024-03-06 RX ORDER — ACETAMINOPHEN 500 MG
975 TABLET ORAL EVERY 6 HOURS
Refills: 0 | Status: DISCONTINUED | OUTPATIENT
Start: 2024-03-06 | End: 2024-03-11

## 2024-03-06 RX ORDER — CEFAZOLIN SODIUM 1 G
2000 VIAL (EA) INJECTION ONCE
Refills: 0 | Status: COMPLETED | OUTPATIENT
Start: 2024-03-06 | End: 2024-03-06

## 2024-03-06 RX ORDER — KETOROLAC TROMETHAMINE 30 MG/ML
15 SYRINGE (ML) INJECTION EVERY 6 HOURS
Refills: 0 | Status: DISCONTINUED | OUTPATIENT
Start: 2024-03-06 | End: 2024-03-11

## 2024-03-06 RX ORDER — ONDANSETRON 8 MG/1
4 TABLET, FILM COATED ORAL EVERY 6 HOURS
Refills: 0 | Status: DISCONTINUED | OUTPATIENT
Start: 2024-03-06 | End: 2024-03-11

## 2024-03-06 RX ORDER — DEXTROSE 50 % IN WATER 50 %
12.5 SYRINGE (ML) INTRAVENOUS ONCE
Refills: 0 | Status: DISCONTINUED | OUTPATIENT
Start: 2024-03-06 | End: 2024-03-11

## 2024-03-06 RX ORDER — GLUCAGON INJECTION, SOLUTION 0.5 MG/.1ML
1 INJECTION, SOLUTION SUBCUTANEOUS ONCE
Refills: 0 | Status: DISCONTINUED | OUTPATIENT
Start: 2024-03-06 | End: 2024-03-11

## 2024-03-06 RX ORDER — CHLORHEXIDINE GLUCONATE 213 G/1000ML
1 SOLUTION TOPICAL ONCE
Refills: 0 | Status: DISCONTINUED | OUTPATIENT
Start: 2024-03-06 | End: 2024-03-06

## 2024-03-06 RX ORDER — SODIUM CHLORIDE 9 MG/ML
1000 INJECTION, SOLUTION INTRAVENOUS
Refills: 0 | Status: DISCONTINUED | OUTPATIENT
Start: 2024-03-06 | End: 2024-03-11

## 2024-03-06 RX ORDER — DEXTROSE 50 % IN WATER 50 %
15 SYRINGE (ML) INTRAVENOUS ONCE
Refills: 0 | Status: DISCONTINUED | OUTPATIENT
Start: 2024-03-06 | End: 2024-03-11

## 2024-03-06 RX ORDER — AMLODIPINE BESYLATE 2.5 MG/1
10 TABLET ORAL ONCE
Refills: 0 | Status: COMPLETED | OUTPATIENT
Start: 2024-03-06 | End: 2024-03-06

## 2024-03-06 RX ORDER — DEXTROSE 50 % IN WATER 50 %
25 SYRINGE (ML) INTRAVENOUS ONCE
Refills: 0 | Status: DISCONTINUED | OUTPATIENT
Start: 2024-03-06 | End: 2024-03-11

## 2024-03-06 RX ORDER — HYDRALAZINE HCL 50 MG
5 TABLET ORAL ONCE
Refills: 0 | Status: COMPLETED | OUTPATIENT
Start: 2024-03-06 | End: 2024-03-06

## 2024-03-06 RX ORDER — LIDOCAINE HCL 20 MG/ML
0.2 VIAL (ML) INJECTION ONCE
Refills: 0 | Status: DISCONTINUED | OUTPATIENT
Start: 2024-03-06 | End: 2024-03-06

## 2024-03-06 RX ORDER — SODIUM CHLORIDE 9 MG/ML
1000 INJECTION, SOLUTION INTRAVENOUS
Refills: 0 | Status: DISCONTINUED | OUTPATIENT
Start: 2024-03-06 | End: 2024-03-07

## 2024-03-06 RX ORDER — SENNA PLUS 8.6 MG/1
2 TABLET ORAL AT BEDTIME
Refills: 0 | Status: DISCONTINUED | OUTPATIENT
Start: 2024-03-06 | End: 2024-03-11

## 2024-03-06 RX ORDER — HEPARIN SODIUM 5000 [USP'U]/ML
5000 INJECTION INTRAVENOUS; SUBCUTANEOUS EVERY 8 HOURS
Refills: 0 | Status: DISCONTINUED | OUTPATIENT
Start: 2024-03-06 | End: 2024-03-11

## 2024-03-06 RX ORDER — INSULIN LISPRO 100/ML
VIAL (ML) SUBCUTANEOUS
Refills: 0 | Status: DISCONTINUED | OUTPATIENT
Start: 2024-03-06 | End: 2024-03-10

## 2024-03-06 RX ADMIN — AMLODIPINE BESYLATE 10 MILLIGRAM(S): 2.5 TABLET ORAL at 14:36

## 2024-03-06 RX ADMIN — Medication 5 MILLIGRAM(S): at 13:20

## 2024-03-06 RX ADMIN — Medication 975 MILLIGRAM(S): at 17:36

## 2024-03-06 RX ADMIN — Medication 10 MILLIGRAM(S): at 13:47

## 2024-03-06 RX ADMIN — Medication 1: at 12:42

## 2024-03-06 RX ADMIN — Medication 5 MILLIGRAM(S): at 12:58

## 2024-03-06 RX ADMIN — LIDOCAINE 1 PATCH: 4 CREAM TOPICAL at 16:58

## 2024-03-06 RX ADMIN — Medication 1: at 17:25

## 2024-03-06 RX ADMIN — Medication 10 MILLIGRAM(S): at 14:15

## 2024-03-06 RX ADMIN — LIDOCAINE 1 PATCH: 4 CREAM TOPICAL at 20:00

## 2024-03-06 RX ADMIN — HEPARIN SODIUM 5000 UNIT(S): 5000 INJECTION INTRAVENOUS; SUBCUTANEOUS at 21:23

## 2024-03-06 RX ADMIN — SODIUM CHLORIDE 125 MILLILITER(S): 9 INJECTION, SOLUTION INTRAVENOUS at 13:10

## 2024-03-06 RX ADMIN — Medication 975 MILLIGRAM(S): at 18:36

## 2024-03-06 NOTE — PROGRESS NOTE ADULT - SUBJECTIVE AND OBJECTIVE BOX
Post op Check    Pt seen and examined without complaints. Pain is controlled. Denies SOB/CP/N/V.     Vital Signs Last 24 Hrs  T(C): 36.4 (06 Mar 2024 18:03), Max: 36.7 (06 Mar 2024 06:44)  T(F): 97.6 (06 Mar 2024 18:03), Max: 98.1 (06 Mar 2024 06:44)  HR: 83 (06 Mar 2024 18:03) (62 - 98)  BP: 148/82 (06 Mar 2024 18:03) (128/83 - 199/113)  BP(mean): 137 (06 Mar 2024 15:00) (100 - 148)  RR: 18 (06 Mar 2024 18:03) (15 - 18)  SpO2: 94% (06 Mar 2024 18:03) (92% - 100%)    Parameters below as of 06 Mar 2024 18:03  Patient On (Oxygen Delivery Method): room air    I&O's Summary    06 Mar 2024 07:01  -  06 Mar 2024 19:05  --------------------------------------------------------  IN: 500 mL / OUT: 2000 mL / NET: -1500 mL    Physical Exam  Gen: NAD, A&Ox3  Pulm: No respiratory distress, no subcostal retractions  CV: RRR, no JVD  Abd: Soft, NT, ND  Back: L NT draining clear punch, no ecchymosis, dressing C/D/I  : Carbajal draining clear pink                           13.9   7.82  )-----------( 248      ( 06 Mar 2024 12:00 )             41.2       03-06    137  |  105  |  25<H>  ----------------------------<  202<H>  4.7   |  23  |  2.18<H>    Ca    8.5      06 Mar 2024 12:00

## 2024-03-06 NOTE — PROGRESS NOTE ADULT - ASSESSMENT
A/P: 73y Male s/p L PCNL   DVT prophylaxis/OOB  Incentive spirometry  Strict I&O's  Analgesia and antiemetics as needed  Diet-regular   CXR-no PNx  AM labs/TOV/clamp PCNU and CT   Follow up Medicine and ID   Will go home with NT

## 2024-03-06 NOTE — CONSULT NOTE ADULT - SUBJECTIVE AND OBJECTIVE BOX
Patient seen and examined. Chart with pertinent labs and imaging reviewed. Full note to follow  Hx via language line #[id number]  POD#0 percutaneous nephrolithotomy  Was on ertapenem 5 days PTA at home for ESBL E. coli asymptomatic bacteriuria in the setting of impending urologic surgery  Feels chilled now, has some pain at the surgical site, otherwise feels ok.  Hope to limit antibiotic use here to the periprocedural period, with overall plan TBD based on clinical course.  Thank you for the courtesy of this referral.  Edgar Arroyo MD  Attending Physician  [facility]  Division of Infectious Diseases  [phone]  ====================  [facility] of Infectious Diseases  [phone]    PLACIDE, CLAUDE  73y, Male  [id number]    HPI--      PMH/PSH--  HTN (hypertension)    Arthrosis    Renal stone    Type 2 diabetes mellitus    Language barrier to communication    Vietnamese Creole  needed    Poor historian    Medication management    Poor compliance with medication    S/P left knee surgery        Allergies--  No Known Allergies      Medications--  Antibiotics:   Immunologic:   Other: acetaminophen     Tablet .. PRN  dextrose 5%.  dextrose 5%.  dextrose 50% Injectable  dextrose 50% Injectable  dextrose 50% Injectable  dextrose Oral Gel PRN  enalapril  glucagon  Injectable  heparin   Injectable  insulin lispro (ADMELOG) corrective regimen sliding scale  ketorolac   Injectable PRN  lactated ringers.  lidocaine   4% Patch  ondansetron Injectable PRN  oxyCODONE    IR PRN  senna PRN    Antimicrobials last 90 days per EMR: MEDICATIONS  (STANDING):        Social History--  EtOH: denies   Tobacco: denies   Drug Use: denies     Family/Marital History--        Travel/Environmental/Occupational History:      Review of Systems:  A >=10-point review of systems was obtained.     Pertinent positives and negatives--  Constitutional: No fevers. No Chills. No Rigors.   Eyes:  ENMT:  Cardiovascular: No chest pain. No palpitations.  Respiratory: No shortness of breath. No cough.  Gastrointestinal: No nausea or vomiting. No diarrhea or constipation.   Genitourinary:  Musculoskeletal:  Skin:  Neurologic:  Psychiatric: Pleasant. Appropriate affect.  Endocrine:  Heme/Lymphatic:  Allergy/Immunologic:    Review of systems otherwise negative except as previously noted.    Physical Exam--  Vital Signs: T(F): 97.5 (03-06-24 @ 16:28), Max: 98.1 (03-06-24 @ 06:44)  HR: 79 (03-06-24 @ 16:28)  BP: 177/96 (03-06-24 @ 16:28)  RR: 18 (03-06-24 @ 16:28)  SpO2: 96% (03-06-24 @ 16:28)  Wt(kg): --  General: Nontoxic-appearing Male in no acute distress.  HEENT: AT/NC. PERRL. EOMI. Anicteric. Conjunctiva pink and moist. Oropharynx clear. Dentition fair.  Neck: Not rigid. No sense of mass.  Nodes: None palpable.  Lungs: Clear bilaterally without rales, wheezing or rhonchi  Heart: Regular rate and rhythm. No Murmur. No rub. No gallop. No palpable thrill.  Abdomen: Bowel sounds present and normoactive. Soft. Nondistended. Nontender. No sense of mass. No organomegaly.  Back: No spinal tenderness. No costovertebral angle tenderness.   Extremities: No cyanosis or clubbing. No edema.   Skin: Warm. Dry. Good turgor. No rash. No vasculitic stigmata.  Psychiatric: Appropriate affect and mood for situation.         Laboratory & Imaging Data--  CBC                        13.9   7.82  )-----------( 248      ( 06 Mar 2024 12:00 )             41.2       Chemistries  03-06    137  |  105  |  25<H>  ----------------------------<  202<H>  4.7   |  23  |  2.18<H>    Ca    8.5      06 Mar 2024 12:00        Culture Data       Patient seen and examined. Chart with pertinent labs and imaging reviewed. Full note to follow  Hx via language line #[id number]  POD#0 percutaneous nephrolithotomy  Was on ertapenem 5 days PTA at home for ESBL E. coli asymptomatic bacteriuria in the setting of impending urologic surgery  Feels chilled now, has some pain at the surgical site, otherwise feels ok.  Hope to limit antibiotic use here to the periprocedural period, with overall plan TBD based on clinical course.  Thank you for the courtesy of this referral.  Edgar Arroyo MD  Attending Physician  [facility]  Division of Infectious Diseases  [phone]  ====================  [facility] of Infectious Diseases  [phone]    PLACIDE, CLAUDE  73y, Male  [id number]    HPI--  Hx via language line #[id number]. 73M hx HTN, DM2, nephrolithiasis, recurrent UTI, previously known to our group al [id number] now POD#0 percutaneous stone extraction. Patient had been given ertapenem at home for 5 days due to asymptomatic bacteriuria in the face of impending urologic procedure.   Patient now feels ok except for some pain at the surgical site and thirst. Felt chilled earlier, ok now.       PMH/PSH--  HTN (hypertension)    Arthrosis    Renal stone    Type 2 diabetes mellitus    Language barrier to communication    Portuguese Creole  needed    Poor historian    Medication management    Poor compliance with medication    S/P left knee surgery        Allergies--  No Known Allergies      Medications--  Antibiotics:   Immunologic:   Other: acetaminophen     Tablet .. PRN  dextrose 5%.  dextrose 5%.  dextrose 50% Injectable  dextrose 50% Injectable  dextrose 50% Injectable  dextrose Oral Gel PRN  enalapril  glucagon  Injectable  heparin   Injectable  insulin lispro (ADMELOG) corrective regimen sliding scale  ketorolac   Injectable PRN  lactated ringers.  lidocaine   4% Patch  ondansetron Injectable PRN  oxyCODONE    IR PRN  senna PRN    Antimicrobials last 90 days per EMR: MEDICATIONS  (STANDING):        Social History--  No toxic habits    Family/Marital History--  No sick contacts  Not relevant to clinical concern    Review of Systems:  A >=10-point review of systems was obtained.   Review of systems otherwise negative except as previously noted.    Physical Exam--  Vital Signs: T(F): 97.5 (03-06-24 @ 16:28), Max: 98.1 (03-06-24 @ 06:44)  HR: 79 (03-06-24 @ 16:28)  BP: 177/96 (03-06-24 @ 16:28)  RR: 18 (03-06-24 @ 16:28)  SpO2: 96% (03-06-24 @ 16:28)  Wt(kg): --  General: Nontoxic-appearing Male in no acute distress.  HEENT: AT/NC. Keeps his eyes closed (volitional) for exam. Anicteric. Conjunctiva pink and moist.   Neck: Not rigid. No sense of mass.  Nodes: None palpable.  Lungs: Poor effort grossly clear  Heart: Regular rate and rhythm.   Abdomen: Bowel sounds present and normoactive. Soft. Mildly distended. Nontender.   : Urinary catheter- 'red fruit punch'  Extremities: No cyanosis or clubbing. No edema.   Skin: Warm. Dry. Good turgor. No rash. No vasculitic stigmata.  Psychiatric: Odd demeanor (e.g. keeps eyes shut, answers in monosyllables).       Laboratory & Imaging Data--  CBC                        13.9   7.82  )-----------( 248      ( 06 Mar 2024 12:00 )             41.2       Chemistries  03-06    137  |  105  |  25<H>  ----------------------------<  202<H>  4.7   |  23  |  2.18<H>    Ca    8.5      06 Mar 2024 12:00        Culture Data

## 2024-03-06 NOTE — PRE-ANESTHESIA EVALUATION ADULT - NSANTHPMHFT_GEN_ALL_CORE
73 yr old male who speaks Dominican Creole (used [employer]  ) hx of Benign Hypertension, Diabetes Mellitus Type2 went to ER with left flank pain dx with stone for procedure.

## 2024-03-06 NOTE — CONSULT NOTE ADULT - ASSESSMENT
POD#0 percutaneous nephrolithotomy  Was on ertapenem 5 days PTA at home for ESBL E. coli asymptomatic bacteriuria in the setting of impending urologic surgery  Feels chilled now, has some pain at the surgical site, otherwise feels ok.  Hope to limit antibiotic use here to the periprocedural period, with overall plan TBD based on clinical course.  Thank you for the courtesy of this referral.  Edgar Arroyo MD  Attending Physician  [facility]  Division of Infectious Diseases  [phone]
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
## 2024-03-07 LAB
ANION GAP SERPL CALC-SCNC: 12 MMOL/L — SIGNIFICANT CHANGE UP (ref 5–17)
BUN SERPL-MCNC: 24 MG/DL — HIGH (ref 7–23)
CALCIUM SERPL-MCNC: 9 MG/DL — SIGNIFICANT CHANGE UP (ref 8.4–10.5)
CHLORIDE SERPL-SCNC: 101 MMOL/L — SIGNIFICANT CHANGE UP (ref 96–108)
CO2 SERPL-SCNC: 22 MMOL/L — SIGNIFICANT CHANGE UP (ref 22–31)
CREAT SERPL-MCNC: 1.98 MG/DL — HIGH (ref 0.5–1.3)
EGFR: 35 ML/MIN/1.73M2 — LOW
GLUCOSE BLDC GLUCOMTR-MCNC: 108 MG/DL — HIGH (ref 70–99)
GLUCOSE BLDC GLUCOMTR-MCNC: 140 MG/DL — HIGH (ref 70–99)
GLUCOSE BLDC GLUCOMTR-MCNC: 141 MG/DL — HIGH (ref 70–99)
GLUCOSE BLDC GLUCOMTR-MCNC: 147 MG/DL — HIGH (ref 70–99)
GLUCOSE SERPL-MCNC: 158 MG/DL — HIGH (ref 70–99)
HCT VFR BLD CALC: 45.4 % — SIGNIFICANT CHANGE UP (ref 39–50)
HGB BLD-MCNC: 14.9 G/DL — SIGNIFICANT CHANGE UP (ref 13–17)
MCHC RBC-ENTMCNC: 27.9 PG — SIGNIFICANT CHANGE UP (ref 27–34)
MCHC RBC-ENTMCNC: 32.8 GM/DL — SIGNIFICANT CHANGE UP (ref 32–36)
MCV RBC AUTO: 84.9 FL — SIGNIFICANT CHANGE UP (ref 80–100)
NRBC # BLD: 0 /100 WBCS — SIGNIFICANT CHANGE UP (ref 0–0)
PLATELET # BLD AUTO: 270 K/UL — SIGNIFICANT CHANGE UP (ref 150–400)
POTASSIUM SERPL-MCNC: 4.7 MMOL/L — SIGNIFICANT CHANGE UP (ref 3.5–5.3)
POTASSIUM SERPL-SCNC: 4.7 MMOL/L — SIGNIFICANT CHANGE UP (ref 3.5–5.3)
RBC # BLD: 5.35 M/UL — SIGNIFICANT CHANGE UP (ref 4.2–5.8)
RBC # FLD: 14 % — SIGNIFICANT CHANGE UP (ref 10.3–14.5)
SODIUM SERPL-SCNC: 135 MMOL/L — SIGNIFICANT CHANGE UP (ref 135–145)
WBC # BLD: 19.23 K/UL — HIGH (ref 3.8–10.5)
WBC # FLD AUTO: 19.23 K/UL — HIGH (ref 3.8–10.5)

## 2024-03-07 PROCEDURE — 74176 CT ABD & PELVIS W/O CONTRAST: CPT | Mod: 26

## 2024-03-07 PROCEDURE — 99232 SBSQ HOSP IP/OBS MODERATE 35: CPT

## 2024-03-07 RX ORDER — SODIUM CHLORIDE 9 MG/ML
1000 INJECTION, SOLUTION INTRAVENOUS
Refills: 0 | Status: DISCONTINUED | OUTPATIENT
Start: 2024-03-07 | End: 2024-03-11

## 2024-03-07 RX ORDER — HYDRALAZINE HCL 50 MG
10 TABLET ORAL ONCE
Refills: 0 | Status: COMPLETED | OUTPATIENT
Start: 2024-03-07 | End: 2024-03-07

## 2024-03-07 RX ADMIN — LIDOCAINE 1 PATCH: 4 CREAM TOPICAL at 04:58

## 2024-03-07 RX ADMIN — OXYCODONE HYDROCHLORIDE 5 MILLIGRAM(S): 5 TABLET ORAL at 21:34

## 2024-03-07 RX ADMIN — Medication 10 MILLIGRAM(S): at 16:17

## 2024-03-07 RX ADMIN — HEPARIN SODIUM 5000 UNIT(S): 5000 INJECTION INTRAVENOUS; SUBCUTANEOUS at 05:50

## 2024-03-07 RX ADMIN — LIDOCAINE 1 PATCH: 4 CREAM TOPICAL at 19:29

## 2024-03-07 RX ADMIN — OXYCODONE HYDROCHLORIDE 5 MILLIGRAM(S): 5 TABLET ORAL at 22:34

## 2024-03-07 RX ADMIN — HEPARIN SODIUM 5000 UNIT(S): 5000 INJECTION INTRAVENOUS; SUBCUTANEOUS at 13:37

## 2024-03-07 RX ADMIN — Medication 20 MILLIGRAM(S): at 05:50

## 2024-03-07 RX ADMIN — MEROPENEM 100 MILLIGRAM(S): 1 INJECTION INTRAVENOUS at 21:34

## 2024-03-07 RX ADMIN — Medication 975 MILLIGRAM(S): at 08:40

## 2024-03-07 RX ADMIN — MEROPENEM 100 MILLIGRAM(S): 1 INJECTION INTRAVENOUS at 09:57

## 2024-03-07 RX ADMIN — HEPARIN SODIUM 5000 UNIT(S): 5000 INJECTION INTRAVENOUS; SUBCUTANEOUS at 21:34

## 2024-03-07 RX ADMIN — LIDOCAINE 1 PATCH: 4 CREAM TOPICAL at 11:38

## 2024-03-07 RX ADMIN — LIDOCAINE 1 PATCH: 4 CREAM TOPICAL at 23:38

## 2024-03-07 RX ADMIN — Medication 975 MILLIGRAM(S): at 09:10

## 2024-03-07 RX ADMIN — SODIUM CHLORIDE 75 MILLILITER(S): 9 INJECTION, SOLUTION INTRAVENOUS at 15:44

## 2024-03-07 NOTE — ADVANCED PRACTICE NURSE CONSULT - RECOMMEDATIONS
Impression:    left lateral thigh healed pressure injury present on admission    Recommendations:    1) continue turning and positioning q2 and PRN utilizing offloading assistive devices  2) continue with routine pericare daily and PRN soiling  3) encourage optimal nutrition  4) waffle cushion when oob to chair  5) B/L LE complete cair air fluidized boots OR jose lock pillow to offload heels/feet  6) ange protective barrier cream to B/L buttocks/sacrum daily and PRN soiling (for prophylactic measures)  7) incontinence management - consider external urinary catheter to divert urine from skin if incontinent  8) sween 24 moisturizer to dry skin daily     Plan discussed with RAEGAN Ornelas at bedside     For questions or comments regarding this consult please call Nafisa at [phone]. Thank you.

## 2024-03-07 NOTE — PROGRESS NOTE ADULT - ASSESSMENT
A/P: 73y Male s/p L PCNL   DVT prophylaxis/OOB  Incentive spirometry  Strict I&O's  Analgesia and antiemetics as needed  Diet-regular   CXR-no PNx  AM labs  f/u TOV  clamp trial of PCNU  f/u CT   f/u culture  meropenem  Follow up Medicine and ID  A/P: 73y Male s/p L PCNL   DVT prophylaxis/OOB  Incentive spirometry  Strict I&O's  Analgesia and antiemetics as needed  Diet-regular   CXR-no PNx  AM labs  f/u TOV  cap NT  f/u CT   f/u culture  meropenem  Follow up Medicine and ID

## 2024-03-07 NOTE — PROGRESS NOTE ADULT - ASSESSMENT
POD#0 percutaneous nephrolithotomy  Was on ertapenem 5 days PTA at home for ESBL E. coli asymptomatic bacteriuria in the setting of impending urologic surgery  Feels chilled now, has some pain at the surgical site, otherwise feels ok.    3/7: Overall feels the same to improved.  White blood cell count elevated–not surprising in the setting.  No fevers.  Culture data from the OR urine pending.  Remains on meropenem.      Suggestions  While white blood cell count elevation likely reactive secondary to surgery, for now would continue meropenem  Monitor temperature curve  Follow-up culture data    Edgar Arroyo MD  Attending Physician  [facility]  Division of Infectious Diseases  [phone]

## 2024-03-07 NOTE — ADVANCED PRACTICE NURSE CONSULT - ASSESSMENT
Patient encountered on 9 Monti. When wound care RN arrived on unit, patient was found lying in a low air loss pressure redistribution support surface style bed while on the telephone. Patient was alert and oriented and gave consent to skin consult. Mr Sunshine is able to position self independently for consult. On left lateral thigh, there are two areas of contracted skin noted, indicative of a prior skin injury and/or drain placement that has since healed. Medial to this site, there are two areas of hypopigmentation noted in a square shape, consistent with a skin graft donor site. Patient denies history of skin graft/surgery/drain placement.

## 2024-03-07 NOTE — CONSULT NOTE ADULT - SUBJECTIVE AND OBJECTIVE BOX
HPI:  73 yr old male who speaks Moroc[place] Creole (used [employer]  ) hx of Benign Hypertension, Diabetes Mellitus Type2 went to ER with left flank pain dx with stone for procedure.    HTN : on Enalapril pt unclear as to dose stated 1tab daily then stated 2 tabs daily ,Instructed to see cardiologist Dr. Wong Li  [phone]) for evaluation pre op obtain note  Diabetes Mellitus type2 per pt takes Metformin when "he feels like it" does not check blood sugar  instructed last dose 3/5    (16 Feb 2024 14:14)      PAST MEDICAL & SURGICAL HISTORY:  HTN (hypertension)      Arthrosis      Renal stone      Type 2 diabetes mellitus      Language barrier to communication      Malay Creole  needed      Poor historian      Poor compliance with medication      S/P left knee surgery          Review of Systems:   CONSTITUTIONAL: No fever, weight loss, or fatigue  EYES: No eye pain, visual disturbances, or discharge  ENMT:  No difficulty hearing, tinnitus, vertigo; No sinus or throat pain  NECK: No pain or stiffness  BREASTS: No pain, masses, or nipple discharge  RESPIRATORY: No cough, wheezing, chills or hemoptysis; No shortness of breath  CARDIOVASCULAR: No chest pain, palpitations, dizziness, or leg swelling  GASTROINTESTINAL: No abdominal or epigastric pain. No nausea, vomiting, or hematemesis; No diarrhea or constipation. No melena or hematochezia.  GENITOURINARY: No dysuria, frequency, hematuria, or incontinence  NEUROLOGICAL: No headaches, memory loss, loss of strength, numbness, or tremors  SKIN: No itching, burning, rashes, or lesions   LYMPH NODES: No enlarged glands  ENDOCRINE: No heat or cold intolerance; No hair loss  MUSCULOSKELETAL: No joint pain or swelling; No muscle, back, or extremity pain  PSYCHIATRIC: No depression, anxiety, mood swings, or difficulty sleeping  HEME/LYMPH: No easy bruising, or bleeding gums  ALLERY AND IMMUNOLOGIC: No hives or eczema    Allergies    No Known Allergies    Intolerances        Social History:     FAMILY HISTORY:      MEDICATIONS  (STANDING):  dextrose 5% + sodium chloride 0.45%. 1000 milliLiter(s) (75 mL/Hr) IV Continuous <Continuous>  dextrose 5%. 1000 milliLiter(s) (50 mL/Hr) IV Continuous <Continuous>  dextrose 5%. 1000 milliLiter(s) (100 mL/Hr) IV Continuous <Continuous>  dextrose 50% Injectable 25 Gram(s) IV Push once  dextrose 50% Injectable 12.5 Gram(s) IV Push once  dextrose 50% Injectable 25 Gram(s) IV Push once  enalapril 20 milliGRAM(s) Oral daily  glucagon  Injectable 1 milliGRAM(s) IntraMuscular once  heparin   Injectable 5000 Unit(s) SubCutaneous every 8 hours  insulin lispro (ADMELOG) corrective regimen sliding scale   SubCutaneous three times a day before meals  lidocaine   4% Patch 1 Patch Transdermal daily  meropenem  IVPB 500 milliGRAM(s) IV Intermittent every 12 hours    MEDICATIONS  (PRN):  acetaminophen     Tablet .. 975 milliGRAM(s) Oral every 6 hours PRN Temp greater or equal to 38C (100.4F), Mild Pain (1 - 3)  dextrose Oral Gel 15 Gram(s) Oral once PRN Blood Glucose LESS THAN 70 milliGRAM(s)/deciliter  ketorolac   Injectable 15 milliGRAM(s) IV Push every 6 hours PRN Moderate Pain (4 - 6)  ondansetron Injectable 4 milliGRAM(s) IV Push every 6 hours PRN Nausea and/or Vomiting  oxyCODONE    IR 5 milliGRAM(s) Oral every 6 hours PRN Severe Pain (7 - 10)  senna 2 Tablet(s) Oral at bedtime PRN Constipation        CAPILLARY BLOOD GLUCOSE      POCT Blood Glucose.: 141 mg/dL (07 Mar 2024 21:11)  POCT Blood Glucose.: 147 mg/dL (07 Mar 2024 16:59)  POCT Blood Glucose.: 108 mg/dL (07 Mar 2024 13:30)  POCT Blood Glucose.: 140 mg/dL (07 Mar 2024 09:11)    I&O's Summary    06 Mar 2024 07:01  -  07 Mar 2024 07:00  --------------------------------------------------------  IN: 2315 mL / OUT: 3800 mL / NET: -1485 mL    07 Mar 2024 07:01  -  07 Mar 2024 22:25  --------------------------------------------------------  IN: 480 mL / OUT: 1700 mL / NET: -1220 mL        PHYSICAL EXAM:  GENERAL: NAD, well-developed  HEAD:  Atraumatic, Normocephalic  EYES: EOMI, PERRLA, conjunctiva and sclera clear  NECK: Supple, No JVD  CHEST/LUNG: Clear to auscultation bilaterally; No wheeze  HEART: Regular rate and rhythm; No murmurs, rubs, or gallops  ABDOMEN: Soft, Nontender, Nondistended; Bowel sounds present  EXTREMITIES:  2+ Peripheral Pulses, No clubbing, cyanosis, or edema  PSYCH: AAOx3  NEUROLOGY: non-focal  SKIN: No rashes or lesions    LABS:                        14.9   19.23 )-----------( 270      ( 07 Mar 2024 08:36 )             45.4     03-07    135  |  101  |  24<H>  ----------------------------<  158<H>  4.7   |  22  |  1.98<H>    Ca    9.0      07 Mar 2024 08:35            Urinalysis Basic - ( 07 Mar 2024 08:35 )    Color: x / Appearance: x / SG: x / pH: x  Gluc: 158 mg/dL / Ketone: x  / Bili: x / Urobili: x   Blood: x / Protein: x / Nitrite: x   Leuk Esterase: x / RBC: x / WBC x   Sq Epi: x / Non Sq Epi: x / Bacteria: x        RADIOLOGY & ADDITIONAL TESTS:    Imaging Personally Reviewed:    Consultant(s) Notes Reviewed:      Care Discussed with Consultants/Other Providers:

## 2024-03-07 NOTE — ADVANCED PRACTICE NURSE CONSULT - REASON FOR CONSULT
Wound care consult initiated by RN to assess patient's skin for a possible healed left lateral thigh skin injury, present on admission     History of Present Illness	  73 yr old male who speaks Ecuadorean Creole (used [employer]  ) hx of Benign Hypertension, Diabetes Mellitus Type2 went to ER with left flank pain dx with stone for procedure.    HTN : on Enalapril pt unclear as to dose stated 1tab daily then stated 2 tabs daily ,Instructed to see cardiologist Dr. Wong Li  [phone]) for evaluation pre op obtain note  Diabetes Mellitus type2 per pt takes Metformin when "he feels like it" does not check blood sugar  instructed last dose 3/5

## 2024-03-07 NOTE — PROGRESS NOTE ADULT - SUBJECTIVE AND OBJECTIVE BOX
[facility]  Division of Infectious Diseases  [phone]    Name: PLACIDE, CLAUDE  Age: 73y  Gender: Male  MRN: [id number]    Interval History--  Notes reviewed.     Past Medical History--  HTN (hypertension)    Arthrosis    Renal stone    Type 2 diabetes mellitus    Language barrier to communication    Belarusian Creole  needed    Poor historian    Medication management    Poor compliance with medication    S/P left knee surgery        For details regarding the patient's social history, family history, and other miscellaneous elements, please refer the initial infectious diseases consultation and/or the admitting history and physical examination for this admission.    Allergies    No Known Allergies    Intolerances        Medications--  Antibiotics:  meropenem  IVPB 500 milliGRAM(s) IV Intermittent every 12 hours    Immunologic:    Other:  acetaminophen     Tablet .. PRN  dextrose 5%.  dextrose 5%.  dextrose 50% Injectable  dextrose 50% Injectable  dextrose 50% Injectable  dextrose Oral Gel PRN  enalapril  glucagon  Injectable  heparin   Injectable  insulin lispro (ADMELOG) corrective regimen sliding scale  ketorolac   Injectable PRN  lactated ringers.  lidocaine   4% Patch  ondansetron Injectable PRN  oxyCODONE    IR PRN  senna PRN      Review of Systems--  A 10-point review of systems was obtained.     Pertinent positives and negatives--  Constitutional: No fevers. No Chills. No Rigors.   Cardiovascular: No chest pain. No palpitations.  Respiratory: No shortness of breath. No cough.  Gastrointestinal: No nausea or vomiting. No diarrhea or constipation.   Psychiatric: Pleasant. Appropriate affect.    Review of systems otherwise negative except as previously noted.    Physical Examination--  Vital Signs: T(F): 98.2 (03-07-24 @ 05:03), Max: 98.6 (03-07-24 @ 01:47)  HR: 93 (03-07-24 @ 05:03)  BP: 149/78 (03-07-24 @ 05:03)  RR: 18 (03-07-24 @ 05:03)  SpO2: 97% (03-07-24 @ 05:03)  Wt(kg): --  General: Nontoxic-appearing Male in no acute distress.  HEENT: AT/NC. PERRL. EOMI. Anicteric. Conjunctiva pink and moist. Oropharynx clear. Dentition fair.  Neck: Not rigid. No sense of mass.  Nodes: None palpable.  Lungs: Clear bilaterally without rales, wheezing or rhonchi  Heart: Regular rate and rhythm. No Murmur. No rub. No gallop. No palpable thrill.  Abdomen: Bowel sounds present and normoactive. Soft. Nondistended. Nontender. No sense of mass. No organomegaly.  Back: No spinal tenderness. No costovertebral angle tenderness.   Extremities: No cyanosis or clubbing. No edema.   Skin: Warm. Dry. Good turgor. No rash. No vasculitic stigmata.  Psychiatric: Appropriate affect and mood for situation.         Laboratory Studies--  CBC                        14.9   19.23 )-----------( 270      ( 07 Mar 2024 08:36 )             45.4       Chemistries  03-07    135  |  101  |  24<H>  ----------------------------<  158<H>  4.7   |  22  |  1.98<H>    Ca    9.0      07 Mar 2024 08:35        Culture Data           [facility]  Division of Infectious Diseases  [phone]    Name: PLACIDE, CLAUDE  Age: 73y  Gender: Male  MRN: [id number]    Interval History--  Notes reviewed. Seen earlier today. Hx via language line #[id number].  Feeling okay.  Pain not an issue.  Denies fevers chills or rigors.    Past Medical History--  HTN (hypertension)    Arthrosis    Renal stone    Type 2 diabetes mellitus    Language barrier to communication    Gambian Creole  needed    Poor historian    Medication management    Poor compliance with medication    S/P left knee surgery        For details regarding the patient's social history, family history, and other miscellaneous elements, please refer the initial infectious diseases consultation and/or the admitting history and physical examination for this admission.    Allergies    No Known Allergies    Intolerances        Medications--  Antibiotics:  meropenem  IVPB 500 milliGRAM(s) IV Intermittent every 12 hours    Immunologic:    Other:  acetaminophen     Tablet .. PRN  dextrose 5%.  dextrose 5%.  dextrose 50% Injectable  dextrose 50% Injectable  dextrose 50% Injectable  dextrose Oral Gel PRN  enalapril  glucagon  Injectable  heparin   Injectable  insulin lispro (ADMELOG) corrective regimen sliding scale  ketorolac   Injectable PRN  lactated ringers.  lidocaine   4% Patch  ondansetron Injectable PRN  oxyCODONE    IR PRN  senna PRN      Review of Systems--  A 10-point review of systems was obtained.   Review of systems otherwise negative except as previously noted.    Physical Examination--  Vital Signs: T(F): 98.2 (03-07-24 @ 05:03), Max: 98.6 (03-07-24 @ 01:47)  HR: 93 (03-07-24 @ 05:03)  BP: 149/78 (03-07-24 @ 05:03)  RR: 18 (03-07-24 @ 05:03)  SpO2: 97% (03-07-24 @ 05:03)  Wt(kg): --  General: Nontoxic-appearing Male in no acute distress.  HEENT: AT/NC. Anicteric. Conjunctiva pink and moist. Rosita grossly clear  Neck: Not rigid. No sense of mass.  Nodes: None palpable.  Lungs: Poor effort grossly clear  Heart: Regular rate and rhythm.   Abdomen: Bowel sounds present and normoactive. Soft. Mildly distended. Nontender.   Extremities: No cyanosis or clubbing. No edema.   Skin: Warm. Dry. Good turgor. No rash. No vasculitic stigmata.  Psychiatric: More appropriate      Laboratory Studies--  CBC                        14.9   19.23 )-----------( 270      ( 07 Mar 2024 08:36 )             45.4     WBC Count: 7.82 K/uL (03.06.24 @ 12:00)          Chemistries  03-07    135  |  101  |  24<H>  ----------------------------<  158<H>  4.7   |  22  |  1.98<H>    Ca    9.0      07 Mar 2024 08:35        Culture Data  None

## 2024-03-07 NOTE — CONSULT NOTE ADULT - ASSESSMENT
73M hx HTN, DM2, nephrolithiasis, recurrent UTI, previously known to our group christina HARRIS now POD#1, s/p PCNL    Patient had been given ertapenem at home for 5 days due to asymptomatic bacteriuria in the face of impending urologic procedure.     s/p PCNL   Meropenem as per ID     Hypertensive Urgency   Patient was started on Vasotec   AVoid ACE/ARB in the setting of CKD   Start Labetalol 100 mg TID   Will re evaluate in AM    DM HISS   ED on CKD resolving

## 2024-03-07 NOTE — PROGRESS NOTE ADULT - SUBJECTIVE AND OBJECTIVE BOX
UROLOGY DAILY PROGRESS NOTE:     Subjective:  Patient seen and examined at bedside. No overnight events.      Objective:  Vital signs  T(F): , Max: 98.6 (03-07-24 @ 01:47)  HR: 93 (03-07-24 @ 05:03)  BP: 149/78 (03-07-24 @ 05:03)  SpO2: 97% (03-07-24 @ 05:03)  Wt(kg): --    I&O's Summary    06 Mar 2024 07:01  -  07 Mar 2024 07:00  --------------------------------------------------------  IN: 1625 mL / OUT: 3800 mL / NET: -2175 mL        Gen: NAD  Pulm: No respiratory distress, no subcostal retractions  CV: RRR, no JVD  Abd: Soft, NT, ND  : ocampo urine strawberry, removed at bedside  Back: L NT strawberry    Labs:  03-06  7.82  / 41.2  /2.18                           13.9   7.82  )-----------( 248      ( 06 Mar 2024 12:00 )             41.2     03-06    137  |  105  |  25<H>  ----------------------------<  202<H>  4.7   |  23  |  2.18<H>    Ca    8.5      06 Mar 2024 12:00          Urine Cx:  pending  UROLOGY DAILY PROGRESS NOTE:     Subjective:  Patient seen and examined at bedside. No overnight events.       Objective:  Vital signs  T(F): , Max: 98.6 (03-07-24 @ 01:47)  HR: 93 (03-07-24 @ 05:03)  BP: 149/78 (03-07-24 @ 05:03)  SpO2: 97% (03-07-24 @ 05:03)  Wt(kg): --    I&O's Summary    06 Mar 2024 07:01  -  07 Mar 2024 07:00  --------------------------------------------------------  IN: 1625 mL / OUT: 3800 mL / NET: -2175 mL        Gen: NAD  Pulm: No respiratory distress, no subcostal retractions  CV: RRR, no JVD  Abd: Soft, NT, ND  : ocampo urine strawberry, removed at bedside  Back: L NT strawberry    Labs:  03-06  7.82  / 41.2  /2.18                           13.9   7.82  )-----------( 248      ( 06 Mar 2024 12:00 )             41.2     03-06    137  |  105  |  25<H>  ----------------------------<  202<H>  4.7   |  23  |  2.18<H>    Ca    8.5      06 Mar 2024 12:00

## 2024-03-08 ENCOUNTER — TRANSCRIPTION ENCOUNTER (OUTPATIENT)
Age: 74
End: 2024-03-08

## 2024-03-08 LAB
-  AMOXICILLIN/CLAVULANIC ACID: SIGNIFICANT CHANGE UP
-  AMOXICILLIN/CLAVULANIC ACID: SIGNIFICANT CHANGE UP
-  AMPICILLIN/SULBACTAM: SIGNIFICANT CHANGE UP
-  AMPICILLIN/SULBACTAM: SIGNIFICANT CHANGE UP
-  AMPICILLIN: SIGNIFICANT CHANGE UP
-  AMPICILLIN: SIGNIFICANT CHANGE UP
-  AZTREONAM: SIGNIFICANT CHANGE UP
-  AZTREONAM: SIGNIFICANT CHANGE UP
-  CEFAZOLIN: SIGNIFICANT CHANGE UP
-  CEFAZOLIN: SIGNIFICANT CHANGE UP
-  CEFEPIME: SIGNIFICANT CHANGE UP
-  CEFEPIME: SIGNIFICANT CHANGE UP
-  CEFOXITIN: SIGNIFICANT CHANGE UP
-  CEFTRIAXONE: SIGNIFICANT CHANGE UP
-  CEFTRIAXONE: SIGNIFICANT CHANGE UP
-  CEFUROXIME: SIGNIFICANT CHANGE UP
-  CIPROFLOXACIN: SIGNIFICANT CHANGE UP
-  CIPROFLOXACIN: SIGNIFICANT CHANGE UP
-  ERTAPENEM: SIGNIFICANT CHANGE UP
-  ERTAPENEM: SIGNIFICANT CHANGE UP
-  GENTAMICIN: SIGNIFICANT CHANGE UP
-  GENTAMICIN: SIGNIFICANT CHANGE UP
-  IMIPENEM: SIGNIFICANT CHANGE UP
-  IMIPENEM: SIGNIFICANT CHANGE UP
-  LEVOFLOXACIN: SIGNIFICANT CHANGE UP
-  LEVOFLOXACIN: SIGNIFICANT CHANGE UP
-  MEROPENEM: SIGNIFICANT CHANGE UP
-  MEROPENEM: SIGNIFICANT CHANGE UP
-  PIPERACILLIN/TAZOBACTAM: SIGNIFICANT CHANGE UP
-  PIPERACILLIN/TAZOBACTAM: SIGNIFICANT CHANGE UP
-  TOBRAMYCIN: SIGNIFICANT CHANGE UP
-  TOBRAMYCIN: SIGNIFICANT CHANGE UP
-  TRIMETHOPRIM/SULFAMETHOXAZOLE: SIGNIFICANT CHANGE UP
-  TRIMETHOPRIM/SULFAMETHOXAZOLE: SIGNIFICANT CHANGE UP
ANION GAP SERPL CALC-SCNC: 12 MMOL/L — SIGNIFICANT CHANGE UP (ref 5–17)
ANION GAP SERPL CALC-SCNC: 9 MMOL/L — SIGNIFICANT CHANGE UP (ref 5–17)
BUN SERPL-MCNC: 24 MG/DL — HIGH (ref 7–23)
BUN SERPL-MCNC: 29 MG/DL — HIGH (ref 7–23)
CALCIUM SERPL-MCNC: 8.4 MG/DL — SIGNIFICANT CHANGE UP (ref 8.4–10.5)
CALCIUM SERPL-MCNC: 8.7 MG/DL — SIGNIFICANT CHANGE UP (ref 8.4–10.5)
CHLORIDE SERPL-SCNC: 100 MMOL/L — SIGNIFICANT CHANGE UP (ref 96–108)
CHLORIDE SERPL-SCNC: 103 MMOL/L — SIGNIFICANT CHANGE UP (ref 96–108)
CO2 SERPL-SCNC: 21 MMOL/L — LOW (ref 22–31)
CO2 SERPL-SCNC: 25 MMOL/L — SIGNIFICANT CHANGE UP (ref 22–31)
CREAT SERPL-MCNC: 2.31 MG/DL — HIGH (ref 0.5–1.3)
CREAT SERPL-MCNC: 2.44 MG/DL — HIGH (ref 0.5–1.3)
CULTURE RESULTS: ABNORMAL
EGFR: 27 ML/MIN/1.73M2 — LOW
EGFR: 29 ML/MIN/1.73M2 — LOW
GLUCOSE BLDC GLUCOMTR-MCNC: 145 MG/DL — HIGH (ref 70–99)
GLUCOSE BLDC GLUCOMTR-MCNC: 165 MG/DL — HIGH (ref 70–99)
GLUCOSE BLDC GLUCOMTR-MCNC: 184 MG/DL — HIGH (ref 70–99)
GLUCOSE BLDC GLUCOMTR-MCNC: 185 MG/DL — HIGH (ref 70–99)
GLUCOSE SERPL-MCNC: 133 MG/DL — HIGH (ref 70–99)
GLUCOSE SERPL-MCNC: 201 MG/DL — HIGH (ref 70–99)
HCT VFR BLD CALC: 38.1 % — LOW (ref 39–50)
HCT VFR BLD CALC: 46.2 % — SIGNIFICANT CHANGE UP (ref 39–50)
HGB BLD-MCNC: 13 G/DL — SIGNIFICANT CHANGE UP (ref 13–17)
HGB BLD-MCNC: 14.8 G/DL — SIGNIFICANT CHANGE UP (ref 13–17)
MCHC RBC-ENTMCNC: 28.1 PG — SIGNIFICANT CHANGE UP (ref 27–34)
MCHC RBC-ENTMCNC: 28.5 PG — SIGNIFICANT CHANGE UP (ref 27–34)
MCHC RBC-ENTMCNC: 32 GM/DL — SIGNIFICANT CHANGE UP (ref 32–36)
MCHC RBC-ENTMCNC: 34.1 GM/DL — SIGNIFICANT CHANGE UP (ref 32–36)
MCV RBC AUTO: 83.6 FL — SIGNIFICANT CHANGE UP (ref 80–100)
MCV RBC AUTO: 87.7 FL — SIGNIFICANT CHANGE UP (ref 80–100)
METHOD TYPE: SIGNIFICANT CHANGE UP
METHOD TYPE: SIGNIFICANT CHANGE UP
NRBC # BLD: 0 /100 WBCS — SIGNIFICANT CHANGE UP (ref 0–0)
NRBC # BLD: 0 /100 WBCS — SIGNIFICANT CHANGE UP (ref 0–0)
ORGANISM # SPEC MICROSCOPIC CNT: ABNORMAL
PLATELET # BLD AUTO: 164 K/UL — SIGNIFICANT CHANGE UP (ref 150–400)
PLATELET # BLD AUTO: 238 K/UL — SIGNIFICANT CHANGE UP (ref 150–400)
POTASSIUM SERPL-MCNC: 4.3 MMOL/L — SIGNIFICANT CHANGE UP (ref 3.5–5.3)
POTASSIUM SERPL-MCNC: 4.4 MMOL/L — SIGNIFICANT CHANGE UP (ref 3.5–5.3)
POTASSIUM SERPL-SCNC: 4.3 MMOL/L — SIGNIFICANT CHANGE UP (ref 3.5–5.3)
POTASSIUM SERPL-SCNC: 4.4 MMOL/L — SIGNIFICANT CHANGE UP (ref 3.5–5.3)
RBC # BLD: 4.56 M/UL — SIGNIFICANT CHANGE UP (ref 4.2–5.8)
RBC # BLD: 5.27 M/UL — SIGNIFICANT CHANGE UP (ref 4.2–5.8)
RBC # FLD: 13.8 % — SIGNIFICANT CHANGE UP (ref 10.3–14.5)
RBC # FLD: 14.1 % — SIGNIFICANT CHANGE UP (ref 10.3–14.5)
SODIUM SERPL-SCNC: 134 MMOL/L — LOW (ref 135–145)
SODIUM SERPL-SCNC: 136 MMOL/L — SIGNIFICANT CHANGE UP (ref 135–145)
SPECIMEN SOURCE: SIGNIFICANT CHANGE UP
WBC # BLD: 12.92 K/UL — HIGH (ref 3.8–10.5)
WBC # BLD: 14.42 K/UL — HIGH (ref 3.8–10.5)
WBC # FLD AUTO: 12.92 K/UL — HIGH (ref 3.8–10.5)
WBC # FLD AUTO: 14.42 K/UL — HIGH (ref 3.8–10.5)

## 2024-03-08 PROCEDURE — 93010 ELECTROCARDIOGRAM REPORT: CPT

## 2024-03-08 PROCEDURE — 99232 SBSQ HOSP IP/OBS MODERATE 35: CPT

## 2024-03-08 PROCEDURE — 99231 SBSQ HOSP IP/OBS SF/LOW 25: CPT

## 2024-03-08 RX ORDER — LABETALOL HCL 100 MG
1 TABLET ORAL
Qty: 90 | Refills: 0
Start: 2024-03-08 | End: 2024-04-06

## 2024-03-08 RX ORDER — LIDOCAINE 4 G/100G
1 CREAM TOPICAL
Qty: 0 | Refills: 0 | DISCHARGE
Start: 2024-03-08

## 2024-03-08 RX ORDER — OXYCODONE AND ACETAMINOPHEN 5; 325 MG/1; MG/1
1 TABLET ORAL
Qty: 10 | Refills: 0
Start: 2024-03-08

## 2024-03-08 RX ORDER — SODIUM CHLORIDE 9 MG/ML
1000 INJECTION INTRAMUSCULAR; INTRAVENOUS; SUBCUTANEOUS ONCE
Refills: 0 | Status: COMPLETED | OUTPATIENT
Start: 2024-03-08 | End: 2024-03-08

## 2024-03-08 RX ORDER — SENNA PLUS 8.6 MG/1
2 TABLET ORAL
Qty: 0 | Refills: 0 | DISCHARGE
Start: 2024-03-08

## 2024-03-08 RX ORDER — LIDOCAINE 4 G/100G
1 CREAM TOPICAL
Refills: 0 | Status: DISCONTINUED | OUTPATIENT
Start: 2024-03-08 | End: 2024-03-11

## 2024-03-08 RX ORDER — LABETALOL HCL 100 MG
100 TABLET ORAL THREE TIMES A DAY
Refills: 0 | Status: DISCONTINUED | OUTPATIENT
Start: 2024-03-08 | End: 2024-03-09

## 2024-03-08 RX ADMIN — Medication 100 MILLIGRAM(S): at 09:00

## 2024-03-08 RX ADMIN — Medication 20 MILLIGRAM(S): at 05:42

## 2024-03-08 RX ADMIN — Medication 975 MILLIGRAM(S): at 04:41

## 2024-03-08 RX ADMIN — SODIUM CHLORIDE 1000 MILLILITER(S): 9 INJECTION INTRAMUSCULAR; INTRAVENOUS; SUBCUTANEOUS at 17:47

## 2024-03-08 RX ADMIN — SODIUM CHLORIDE 75 MILLILITER(S): 9 INJECTION, SOLUTION INTRAVENOUS at 21:48

## 2024-03-08 RX ADMIN — Medication 100 MILLIGRAM(S): at 21:46

## 2024-03-08 RX ADMIN — MEROPENEM 100 MILLIGRAM(S): 1 INJECTION INTRAVENOUS at 21:45

## 2024-03-08 RX ADMIN — Medication 975 MILLIGRAM(S): at 05:41

## 2024-03-08 RX ADMIN — LIDOCAINE 1 PATCH: 4 CREAM TOPICAL at 19:00

## 2024-03-08 RX ADMIN — Medication 1: at 18:04

## 2024-03-08 RX ADMIN — MEROPENEM 100 MILLIGRAM(S): 1 INJECTION INTRAVENOUS at 08:59

## 2024-03-08 RX ADMIN — HEPARIN SODIUM 5000 UNIT(S): 5000 INJECTION INTRAVENOUS; SUBCUTANEOUS at 21:46

## 2024-03-08 RX ADMIN — HEPARIN SODIUM 5000 UNIT(S): 5000 INJECTION INTRAVENOUS; SUBCUTANEOUS at 13:06

## 2024-03-08 RX ADMIN — HEPARIN SODIUM 5000 UNIT(S): 5000 INJECTION INTRAVENOUS; SUBCUTANEOUS at 05:42

## 2024-03-08 RX ADMIN — Medication 100 MILLIGRAM(S): at 14:04

## 2024-03-08 RX ADMIN — Medication 1: at 09:05

## 2024-03-08 RX ADMIN — LIDOCAINE 1 PATCH: 4 CREAM TOPICAL at 14:26

## 2024-03-08 NOTE — DISCHARGE NOTE NURSING/CASE MANAGEMENT/SOCIAL WORK - NSSCTYPOFSERV_GEN_ALL_CORE
Home RN services to assist with management of nephrostomy tube. RN will contact you to schedule a visit time. Anticipated start of care 3/12.

## 2024-03-08 NOTE — PROGRESS NOTE ADULT - SUBJECTIVE AND OBJECTIVE BOX
[facility]  Division of Infectious Diseases  [phone]    Name: PLACIDE, CLAUDE  Age: 73y  Gender: Male  MRN: [id number]    Interval History--  Notes reviewed. Hx via language hemalatha [id number].   Ha some dysuria, otherwise no complaints.  +Hematuria  Again keeps his eyes closed during bulk of the encounter.       Past Medical History--  HTN (hypertension)    Arthrosis    Renal stone    Type 2 diabetes mellitus    Language barrier to communication    Micronesian Creole  needed    Poor historian    Medication management    Poor compliance with medication    S/P left knee surgery        For details regarding the patient's social history, family history, and other miscellaneous elements, please refer the initial infectious diseases consultation and/or the admitting history and physical examination for this admission.    Allergies    No Known Allergies    Intolerances        Medications--  Antibiotics:  meropenem  IVPB 500 milliGRAM(s) IV Intermittent every 12 hours    Immunologic:    Other:  acetaminophen     Tablet .. PRN  dextrose 5% + sodium chloride 0.45%.  dextrose 5%.  dextrose 5%.  dextrose 50% Injectable  dextrose 50% Injectable  dextrose 50% Injectable  dextrose Oral Gel PRN  glucagon  Injectable  heparin   Injectable  insulin lispro (ADMELOG) corrective regimen sliding scale  ketorolac   Injectable PRN  labetalol  lidocaine   4% Patch  lidocaine 2% Gel PRN  ondansetron Injectable PRN  oxyCODONE    IR PRN  senna PRN      Review of Systems--  A 10-point review of systems was obtained.   Review of systems otherwise negative except as previously noted.    Physical Examination--  Vital Signs: T(F): 98.6 (03-08-24 @ 20:07), Max: 99.7 (03-08-24 @ 13:27)  HR: 84 (03-08-24 @ 20:07)  BP: 155/87 (03-08-24 @ 20:07)  RR: 18 (03-08-24 @ 20:07)  SpO2: 96% (03-08-24 @ 20:07)  Wt(kg): --  General: Nontoxic-appearing Male in no acute distress.  HEENT: AT/NC. Anicteric. Conjunctiva pink and moist. Rosita grossly clear  Neck: Not rigid. No sense of mass.  Nodes: None palpable.  Lungs: Poor effort grossly clear  Heart: Regular rate and rhythm.   Abdomen: Bowel sounds present and normoactive. Soft. Mildly distended. Nontender.   Extremities: No cyanosis or clubbing. No edema.   Skin: Warm. Dry. Good turgor. No rash. No vasculitic stigmata.  Psychiatric: More appropriate      Laboratory Studies--  CBC                        13.0   12.92 )-----------( 238      ( 08 Mar 2024 17:52 )             38.1       Chemistries  03-08    134<L>  |  100  |  29<H>  ----------------------------<  201<H>  4.4   |  25  |  2.44<H>    Ca    8.4      08 Mar 2024 17:52        Culture Data  Culture - Urine (03.06.24 @ 13:04)    -  Trimethoprim/Sulfamethoxazole: R >2/38   -  Trimethoprim/Sulfamethoxazole: S 1/19   -  Amoxicillin/Clavulanic Acid: R >16/8   -  Amoxicillin/Clavulanic Acid: I 16/8   -  Ampicillin: R >16 These ampicillin results predict results for amoxicillin   -  Ampicillin: R >16 These ampicillin results predict results for amoxicillin   -  Ampicillin/Sulbactam: R >16/8   -  Ampicillin/Sulbactam: R >16/8   -  Aztreonam: R >16   -  Aztreonam: R >16   -  Cefazolin: R >16   -  Cefazolin: R >16 For uncomplicated UTI with K. pneumoniae, E. coli, or P. mirablis: JESICA <=16 is sensitive and JESICA >=32 is resistant. This also predicts results for oral agents cefaclor, cefdinir, cefpodoxime, cefprozil, cefuroxime axetil, cephalexin and locarbef for uncomplicated UTI. Note that some isolates may be susceptible to these agents while testing resistant to cefazolin.   -  Cefepime: R >16   -  Cefepime: R >16   -  Cefoxitin: R >16   -  Ceftriaxone: R >32 Enterobacter cloacae, Klebsiella aerogenes, and Citrobacter freundii may develop resistance during prolonged therapy.   -  Ceftriaxone: R >32   -  Cefuroxime: R >16   -  Ciprofloxacin: R >2   -  Ciprofloxacin: R >2   -  Ertapenem: S <=0.5   -  Ertapenem: S <=0.5   -  Gentamicin: R >8   -  Gentamicin: R >8   -  Imipenem: S <=1   -  Imipenem: S <=1   -  Levofloxacin: R >4   -  Levofloxacin: R >4   -  Meropenem: S <=1   -  Meropenem: S <=1   -  Piperacillin/Tazobactam: SDD 16   -  Piperacillin/Tazobactam: R >64   -  Tobramycin: R >8   -  Tobramycin: R >8   Specimen Source: Kidney   Culture Results:   >100,000 CFU/ml Escherichia coli ESBL  50,000 - 99,000 CFU/mL Enterobacter cloacae complex   Organism Identification: Escherichia coli ESBL  Enterobacter cloacae complex   Organism: Escherichia coli ESBL   Organism: Enterobacter cloacae complex   Method Type: JESICA   Method Type: JESICA

## 2024-03-08 NOTE — DISCHARGE NOTE PROVIDER - NSDCCPTREATMENT_GEN_ALL_CORE_FT
PRINCIPAL PROCEDURE  Procedure: Percutaneous nephrolithotripsy using holmium laser  Findings and Treatment:      PRINCIPAL PROCEDURE  Procedure: Percutaneous nephrolithotripsy using holmium laser  Findings and Treatment: left side

## 2024-03-08 NOTE — CHART NOTE - NSCHARTNOTEFT_GEN_A_CORE
UROLOGY NOTE: Delayed entry event at 5:30pm     RN called to report pt c/o lightheadedness. Vitals done showed BP72/42, manual BP 80/50, HR 98, afebrile. Pt laying in bed comfortably. One hour before was c/o left flank pain, NT was opened and left to drainage. Pt reported relief of pain. He denies SOB, CP, palpitations, nausea or vomiting     Objective:  Vital signs  T(F): , Max: 99.7 (03-08-24 @ 13:27)  HR: 84 (03-08-24 @ 20:07)  BP: 155/87 (03-08-24 @ 20:07)  SpO2: 96% (03-08-24 @ 20:07)  Wt(kg): --    I&O's Summary    07 Mar 2024 07:01  -  08 Mar 2024 07:00  --------------------------------------------------------  IN: 1305 mL / OUT: 2700 mL / NET: -1395 mL    08 Mar 2024 07:01  -  08 Mar 2024 22:09  --------------------------------------------------------  IN: 1520 mL / OUT: 1250 mL / NET: 270 mL      Gen: NAD  Pulm: No respiratory distress, no subcostal retractions  CV: RRR, no JVD  Abd: Soft, NT, ND  Back: NT draining dark red urine    Labs:  03-08  12.92 / 38.1  /2.44   03-08  14.42 / 46.2  /2.31                           13.0   12.92 )-----------( 238      ( 08 Mar 2024 17:52 )             38.1     03-08    134<L>  |  100  |  29<H>  ----------------------------<  201<H>  4.4   |  25  |  2.44<H>    Ca    8.4      08 Mar 2024 17:52      Plan:   -1L bolus given with good response, repeat /91  -EKG-NSR  -Hold BP meds for tonight  -Repeat labs noted   -Monitor urine color   -Check AM labs  -Follow up orthostatics       Urine Cx:

## 2024-03-08 NOTE — PROGRESS NOTE ADULT - ASSESSMENT
73 year old male s/p left PCNL, POD#1    -AM labs  -analgesia as needed  -monitor I's and O's  -OOB/DVT prophylaxis  -discharge planning today with nephrostomy tube

## 2024-03-08 NOTE — PROGRESS NOTE ADULT - SUBJECTIVE AND OBJECTIVE BOX
HPI:  73 yr old male who speaks [place] Creole (used [employer] Pad  ) hx of Benign Hypertension, Diabetes Mellitus Type2 went to ER with left flank pain dx with stone for procedure.    HTN : on Enalapril pt unclear as to dose stated 1tab daily then stated 2 tabs daily ,Instructed to see cardiologist Dr. Wong Li  [phone]) for evaluation pre op obtain note  Diabetes Mellitus type2 per pt takes Metformin when "he feels like it" does not check blood sugar  instructed last dose 3/5    (16 Feb 2024 14:14)      PAST MEDICAL & SURGICAL HISTORY:  HTN (hypertension)      Arthrosis      Renal stone      Type 2 diabetes mellitus      Language barrier to communication      Uzbek Creole  needed      Poor historian      Poor compliance with medication      S/P left knee surgery          Review of Systems:   CONSTITUTIONAL: No fever, weight loss, or fatigue  EYES: No eye pain, visual disturbances, or discharge  ENMT:  No difficulty hearing, tinnitus, vertigo; No sinus or throat pain  NECK: No pain or stiffness  BREASTS: No pain, masses, or nipple discharge  RESPIRATORY: No cough, wheezing, chills or hemoptysis; No shortness of breath  CARDIOVASCULAR: No chest pain, palpitations, dizziness, or leg swelling  GASTROINTESTINAL: No abdominal or epigastric pain. No nausea, vomiting, or hematemesis; No diarrhea or constipation. No melena or hematochezia.  GENITOURINARY: No dysuria, frequency, hematuria, or incontinence  NEUROLOGICAL: No headaches, memory loss, loss of strength, numbness, or tremors  SKIN: No itching, burning, rashes, or lesions   LYMPH NODES: No enlarged glands  ENDOCRINE: No heat or cold intolerance; No hair loss  MUSCULOSKELETAL: No joint pain or swelling; No muscle, back, or extremity pain  PSYCHIATRIC: No depression, anxiety, mood swings, or difficulty sleeping  HEME/LYMPH: No easy bruising, or bleeding gums  ALLERY AND IMMUNOLOGIC: No hives or eczema    Allergies    No Known Allergies    Intolerances        Social History:     FAMILY HISTORY:      MEDICATIONS  (STANDING):  dextrose 5% + sodium chloride 0.45%. 1000 milliLiter(s) (75 mL/Hr) IV Continuous <Continuous>  dextrose 5%. 1000 milliLiter(s) (50 mL/Hr) IV Continuous <Continuous>  dextrose 5%. 1000 milliLiter(s) (100 mL/Hr) IV Continuous <Continuous>  dextrose 50% Injectable 25 Gram(s) IV Push once  dextrose 50% Injectable 12.5 Gram(s) IV Push once  dextrose 50% Injectable 25 Gram(s) IV Push once  enalapril 20 milliGRAM(s) Oral daily  glucagon  Injectable 1 milliGRAM(s) IntraMuscular once  heparin   Injectable 5000 Unit(s) SubCutaneous every 8 hours  insulin lispro (ADMELOG) corrective regimen sliding scale   SubCutaneous three times a day before meals  lidocaine   4% Patch 1 Patch Transdermal daily  meropenem  IVPB 500 milliGRAM(s) IV Intermittent every 12 hours    MEDICATIONS  (PRN):  acetaminophen     Tablet .. 975 milliGRAM(s) Oral every 6 hours PRN Temp greater or equal to 38C (100.4F), Mild Pain (1 - 3)  dextrose Oral Gel 15 Gram(s) Oral once PRN Blood Glucose LESS THAN 70 milliGRAM(s)/deciliter  ketorolac   Injectable 15 milliGRAM(s) IV Push every 6 hours PRN Moderate Pain (4 - 6)  ondansetron Injectable 4 milliGRAM(s) IV Push every 6 hours PRN Nausea and/or Vomiting  oxyCODONE    IR 5 milliGRAM(s) Oral every 6 hours PRN Severe Pain (7 - 10)  senna 2 Tablet(s) Oral at bedtime PRN Constipation        CAPILLARY BLOOD GLUCOSE      POCT Blood Glucose.: 141 mg/dL (07 Mar 2024 21:11)  POCT Blood Glucose.: 147 mg/dL (07 Mar 2024 16:59)  POCT Blood Glucose.: 108 mg/dL (07 Mar 2024 13:30)  POCT Blood Glucose.: 140 mg/dL (07 Mar 2024 09:11)    I&O's Summary    06 Mar 2024 07:01  -  07 Mar 2024 07:00  --------------------------------------------------------  IN: 2315 mL / OUT: 3800 mL / NET: -1485 mL    07 Mar 2024 07:01  -  07 Mar 2024 22:25  --------------------------------------------------------  IN: 480 mL / OUT: 1700 mL / NET: -1220 mL        PHYSICAL EXAM:  GENERAL: NAD, well-developed  HEAD:  Atraumatic, Normocephalic  EYES: EOMI, PERRLA, conjunctiva and sclera clear  NECK: Supple, No JVD  CHEST/LUNG: Clear to auscultation bilaterally; No wheeze  HEART: Regular rate and rhythm; No murmurs, rubs, or gallops  ABDOMEN: Soft, Nontender, Nondistended; Bowel sounds present  EXTREMITIES:  2+ Peripheral Pulses, No clubbing, cyanosis, or edema  PSYCH: AAOx3  NEUROLOGY: non-focal  SKIN: No rashes or lesions    LABS:                        14.9   19.23 )-----------( 270      ( 07 Mar 2024 08:36 )             45.4     03-07    135  |  101  |  24<H>  ----------------------------<  158<H>  4.7   |  22  |  1.98<H>    Ca    9.0      07 Mar 2024 08:35            Urinalysis Basic - ( 07 Mar 2024 08:35 )    Color: x / Appearance: x / SG: x / pH: x  Gluc: 158 mg/dL / Ketone: x  / Bili: x / Urobili: x   Blood: x / Protein: x / Nitrite: x   Leuk Esterase: x / RBC: x / WBC x   Sq Epi: x / Non Sq Epi: x / Bacteria: x        RADIOLOGY & ADDITIONAL TESTS:    Imaging Personally Reviewed:    Consultant(s) Notes Reviewed:      Care Discussed with Consultants/Other Providers:

## 2024-03-08 NOTE — DISCHARGE NOTE PROVIDER - NSDCFUSCHEDAPPT_GEN_ALL_CORE_FT
Tom Patterson  [facility] Physician Formerly [facility], [facility]  UROLOGY [address] [facility]  Scheduled Appointment: 03/15/2024

## 2024-03-08 NOTE — DISCHARGE NOTE PROVIDER - NSDCDCMDCOMP_GEN_ALL_CORE
Physical Therapy Visit    Visit Type: Daily Treatment Note  Visit: 6  Referring Provider: Sandy Rodriguez MD  Medical Diagnosis (from order): Diagnosis Information    Diagnosis  S99.911A (ICD-10-CM) - Injury of right ankle, initial encounter         SUBJECTIVE                                                                                                               Patient notes the right ankle pain is on and off. Notes sometimes she feels great like she could run and others when it hurts to walk up the stairs. Notes pain is less severe and less frequent compared to onset. Notes more of an achy pain compared to sharp pain. Notes the tibialis anterior pain has decreased.   Functional Change: See above    Pain / Symptoms  - Pain rating (out of 10): Current: 0 ; Worst: 4        OBJECTIVE                                                                                                                       Strength  (out of 5 unless noted, standard test position unless noted)   Ankle:    - Dorsiflexion:        • Right: 5    - Plantar Flexion:        • Right: 5    - Inversion:        • Right: 4    - Eversion:        • Right: 4                                 Ambulation / Gait  Brace on right ankle - increased antalgic gait without use of ankle brace  Increased gait speed = increased antalgic gait                       Treatment       Therapeutic Exercise  Upright bike level 3 x5 minutes   Heel raises x12  - up with 2, down with right 2x10  Mini squats 2x10  Standing gastroc stretch 2x30 seconds  Lunges 2x8 bilaterally  Forward step ups on 6\" step 2x12    Manual Therapy   STM to right tibialis anterior    Neuromuscular Re-Education  Tandem balance x30 seconds bilaterally  Tandem balance on airex pad 2x30 seconds bilaterally  Tandem balance on solid ground with ball toss to rebounder  With ball toss while on airex pad:  - 1/2 tandem   - 3/4 tandem  - tandem   Lateral wobble board (rectangular)  Lateral and  anterior/posterior wobble board (square)  Trial of Right single leg stance - increased pain, discontinued today    Skilled input: verbal instruction/cues, tactile instruction/cues and as detailed above    Writer verbally educated and received verbal consent for hand placement, positioning of patient, and techniques to be performed today from patient for clothing adjustments for techniques, therapist position for techniques and hand placement and palpation for techniques as described above and how they are pertinent to the patient's plan of care.  Home Exercise Program  Access Code: [email or web address]  URL: [email or web address]  Date: 09/19/2023  Prepared by: Dillon Daley      ASSESSMENT                                                                                                            Patient continues to gradually progress with decreased pain frequency and intensity. Good response to increased therex and balance today. Decreased point tenderness in right tibialis anterior today compared to previous visit. Had 1x sharp pain after 2nd set of lunges that only lasted a few seconds and was less sharp in comparison to previous sharp pains (1/10).       Education:   - Results of above outlined education: Needs reinforcement    PLAN                                                                                                                           Suggestions for next session as indicated: Progress per plan of care         Therapy procedure time and total treatment time can be found documented on the Time Entry flowsheet     This document is complete and the patient is ready for discharge.

## 2024-03-08 NOTE — DISCHARGE NOTE PROVIDER - HOSPITAL COURSE
73 year old male with HTN, DM2, nephrolithiasis who underwent left PCNL 3/6/24 with Dr. Patterson with ocampo and nephroureteral catheter placement and was admitted for monitoring. POD1 his ocampo removed and he was voiding with low residual urine, urine was clear red. Patient was hemodynamically stable with stable labs. His PCNU tube was capped and he was taught how to replace dressing as needed.   At the time of discharge, the patient was hemodynamically stable, was tolerating PO diet, was voiding urine and passing stool, was ambulating, and was comfortable with adequate pain control. The patient was instructed to follow up with Dr. Patterson within 1 week after discharge from the hospital. The patient/family felt comfortable with discharge. The patient was discharged to home with the capped nephroureteral tube. The patient had no other issues.   73 year old male with HTN, DM2, nephrolithiasis who underwent left PCNL 3/6/24 with Dr. Patterson with ocampo and nephroureteral catheter placement and was admitted for monitoring. POD1 his ocampo removed and he was voiding with low residual urine, urine was clear red. Patient was hemodynamically stable with stable labs. His PCNU tube was capped and he was taught how to replace dressing as needed.   3/8 Patient had complaints of lightheadedness and BP was 74/42. received a 1L bolus and pressures responded appropriately  3/9: BPs were improved this AM. Blood pressure medications were adjusted by medicine. Nephrostomy color was a clear yellow.     At the time of discharge, the patient was hemodynamically stable, was tolerating PO diet, was voiding urine and passing stool, was ambulating, and was comfortable with adequate pain control. The patient was instructed to follow up with Dr. Patterson within 1 week after discharge from the hospital. The patient/family felt comfortable with discharge. The patient was discharged to home with the capped nephroureteral tube. The patient had no other issues.   73 year old male with HTN, DM2, nephrolithiasis who underwent left PCNL 3/6/24 with Dr. Patterson with ocampo and nephroureteral catheter placement and was admitted for monitoring. POD1 his ocampo removed and he was voiding with low residual urine, urine was clear red. Patient was hemodynamically stable with stable labs. His PCNU tube was left open to drainage and he was taught how to replace dressing as needed.   3/8 Patient had complaints of lightheadedness and BP was 74/42. received a 1L bolus and pressures responded appropriately  3/9: BPs were improved this AM. Blood pressure medications were adjusted by medicine. Nephrostomy color was a clear yellow.     At the time of discharge, the patient was hemodynamically stable, was tolerating PO diet, was voiding urine and passing stool, was ambulating, and was comfortable with adequate pain control. The patient was instructed to follow up with Dr. Patterson within 1 week after discharge from the hospital. The patient/family felt comfortable with discharge. The patient was discharged to home with the nephroureteral tube to drainage. The patient had no other issues.

## 2024-03-08 NOTE — DISCHARGE NOTE PROVIDER - NSDCFUADDINST_GEN_ALL_CORE_FT
You will be sent home with a nephrostomy tube. It will remain capped and will not drain.   There may be leaking around tube, and you may apply dressing with gauze and tape as needed.     -It is common to have blood in the urine after your surgery.  It may be pink or even red; inform your doctor if you have a significant amount of clots in the urine or if you are unable to void at all.  Keep yourself well hydrated at all times.  -You may shower, even with a tube in your back; keep the dressing over the tube and change it once you finish showering.    -You will be given a prescription for pain medication; continue using this as long as your pain persists.  As soon as Extra Strength Tylenol is adequate, you can switch to this instead – it is less constipating. Please avoid NSAID medications due to your kidney function.   -You will have a prescription for an antibiotic when you go home.  Take these medications as instructed; if you miss one dose, resume taking them on your previous schedule until you have completed the entire course of treatment.  -Do not drive or perform strenuous activities until your are told to resume this activity by your doctor.   You may climb stairs and you may resume sexual activity – be careful not to dislodge your tube if it is still in place.  -Call your physician if you have a fever over 101F.  -Make a follow up appointment with your urologist for the week after discharge.  -Call your urologist during normal business hours with any other routine questions.

## 2024-03-08 NOTE — DISCHARGE NOTE PROVIDER - NSDCMRMEDTOKEN_GEN_ALL_CORE_FT
metFORMIN 500 mg oral tablet: 1 tab(s) orally once a day   labetalol 100 mg oral tablet: 1 tab(s) orally 3 times a day  metFORMIN 500 mg oral tablet: 1 tab(s) orally once a day  Percocet 5 mg-325 mg oral tablet: 1 tab(s) orally every 4 hours as needed for  severe pain MDD: 4 tablets  senna leaf extract oral tablet: 2 tab(s) orally once a day (at bedtime) As needed Constipation   labetalol 100 mg oral tablet: 1 tab(s) orally 3 times a day  lidocaine 2% topical gel with applicator: 1 Apply topically to affected area every 3 hours As needed penile pain  metFORMIN 500 mg oral tablet: 1 tab(s) orally once a day  Percocet 5 mg-325 mg oral tablet: 1 tab(s) orally every 4 hours as needed for  severe pain MDD: 4 tablets  senna leaf extract oral tablet: 2 tab(s) orally once a day (at bedtime) As needed Constipation   amLODIPine 5 mg oral tablet: 1 tab(s) orally once a day  labetalol 100 mg oral tablet: 1 tab(s) orally 3 times a day  lidocaine 2% topical gel with applicator: 1 Apply topically to affected area every 3 hours As needed penile pain  metFORMIN 500 mg oral tablet: 1 tab(s) orally once a day  Percocet 5 mg-325 mg oral tablet: 1 tab(s) orally every 4 hours as needed for  severe pain MDD: 4 tablets  senna leaf extract oral tablet: 2 tab(s) orally once a day (at bedtime) As needed Constipation

## 2024-03-08 NOTE — DISCHARGE NOTE PROVIDER - NSDCCPCAREPLAN_GEN_ALL_CORE_FT
PRINCIPAL DISCHARGE DIAGNOSIS  Diagnosis: Renal calculi  Assessment and Plan of Treatment:      PRINCIPAL DISCHARGE DIAGNOSIS  Diagnosis: Renal calculi  Assessment and Plan of Treatment: There is a wound in the back that may leak urine until it closes up completely  You were discharged with a left nephrostomy tube to drainage  Take tylenol for pain, senna to prevent constipation, and the antibiotics  Drink plenty of fluids  Follow up with Dr Patterson in 1 week to have the nephrostomy tube assessed and potentially removed  Call the office at [phone] if you have fevers of 101F+ or heavy bleeding in the urine.        SECONDARY DISCHARGE DIAGNOSES  Diagnosis: Type 2 diabetes mellitus  Assessment and Plan of Treatment: Continue taking  your diabetic medication regularly.  Be sure to check your fingersticks 3x/day and before bedtime.  Follow up routinely with your primary care doctor or your endocrinologist.      Diagnosis: HTN (hypertension)  Assessment and Plan of Treatment: You were prescribed 2 new medications by the medical doctor for your blood pressure, amlodipine and labetalol.  Please follow up with your primary care doctor to recheck your blood pressure and have your new blood pressure medications ajdusted if needed.

## 2024-03-08 NOTE — PROGRESS NOTE ADULT - ASSESSMENT
POD#0 percutaneous nephrolithotomy  Was on ertapenem 5 days PTA at home for ESBL E. coli asymptomatic bacteriuria in the setting of impending urologic surgery  Feels chilled now, has some pain at the surgical site, otherwise feels ok.    3/7: Overall feels the same to improved.  White blood cell count elevated–not surprising in the setting.  No fevers.  Culture data from the OR urine pending.  Remains on meropenem.    3/8: improved WBC, suspect reactive leiukocytosis. Intermittent dysuria despite effective antibiotics prior to during and after surgery. Denied any dysuria yesterday- ?noninfectious.  case d/w urology PA, apparently for discharge today     Suggestions  While remains in house would continue meropenem- we''ll see if dysuria resolves.   No PO options. I do not think R:B of OPD IV antibiotics at present is favorable  Monitor temperature curve  Trend CBC    If any ID input is needed over the weekend, please call [phone]. Thanks.    Edgar Arroyo MD  Attending Physician  [facility]  Division of Infectious Diseases  [phone]

## 2024-03-08 NOTE — PROGRESS NOTE ADULT - SUBJECTIVE AND OBJECTIVE BOX
The patient was seen and examined at bedside.  IOCOM  was used.  No acute events overnight.  Pt states that he has been having a burning upon urination.  Denies any other pain.    T(C): 36.6 (03-08-24 @ 05:30), Max: 37.5 (03-07-24 @ 17:00)  HR: 94 (03-08-24 @ 05:30) (91 - 108)  BP: 168/96 (03-08-24 @ 05:30) (158/93 - 169/109)  RR: 18 (03-08-24 @ 05:30) (18 - 18)  SpO2: 95% (03-08-24 @ 05:30) (95% - 100%)  Wt(kg): --    Physical Exam:    General: NAD, A+Ox3  Abdomen: soft, non-tender, non-distended  Back: dressing with serosanguinous drainage and was changed this AM; no ecchymosis or hematoma      03-07 @ 07:01  -  03-08 @ 07:00  --------------------------------------------------------  IN: 1080 mL / OUT: 2700 mL / NET: -1620 mL      void - 1450cc, red   The patient was seen and examined at bedside.  [place] creole  was used.  No acute events overnight.  Pt states that he has been having a burning upon urination.  Denies any other pain.    T(C): 36.6 (03-08-24 @ 05:30), Max: 37.5 (03-07-24 @ 17:00)  HR: 94 (03-08-24 @ 05:30) (91 - 108)  BP: 168/96 (03-08-24 @ 05:30) (158/93 - 169/109)  RR: 18 (03-08-24 @ 05:30) (18 - 18)  SpO2: 95% (03-08-24 @ 05:30) (95% - 100%)  Wt(kg): --    Physical Exam:    General: NAD, A+Ox3  Abdomen: soft, non-tender, non-distended  Back: dressing with serosanguinous drainage and was changed this AM; no ecchymosis or hematoma      03-07 @ 07:01  -  03-08 @ 07:00  --------------------------------------------------------  IN: 1080 mL / OUT: 2700 mL / NET: -1620 mL      void - 1450cc, red    L NT - no output

## 2024-03-08 NOTE — CONSULT NOTE ADULT - SUBJECTIVE AND OBJECTIVE BOX
NEPHROLOGY - NSN    Patient seen and examined.    HPI:  73 yr old male who speaks Iranian Creole (used [employer]  ) hx of Benign Hypertension, Diabetes Mellitus Type2 went to ER with left flank pain dx with stone for procedure.    HTN : on Enalapril pt unclear as to dose stated 1tab daily then stated 2 tabs daily ,Instructed to see cardiologist Dr. Wong Li  [phone]) for evaluation pre op obtain note  Diabetes Mellitus type2 per pt takes Metformin when "he feels like it" does not check blood sugar  instructed last dose 3/5    (16 Feb 2024 14:14)      PAST MEDICAL & SURGICAL HISTORY:  HTN (hypertension)      Arthrosis      Renal stone      Type 2 diabetes mellitus      Language barrier to communication      Maori Creole  needed      Poor historian      Poor compliance with medication      S/P left knee surgery          MEDICATIONS  (STANDING):  dextrose 5% + sodium chloride 0.45%. 1000 milliLiter(s) (75 mL/Hr) IV Continuous <Continuous>  dextrose 5%. 1000 milliLiter(s) (50 mL/Hr) IV Continuous <Continuous>  dextrose 5%. 1000 milliLiter(s) (100 mL/Hr) IV Continuous <Continuous>  dextrose 50% Injectable 25 Gram(s) IV Push once  dextrose 50% Injectable 12.5 Gram(s) IV Push once  dextrose 50% Injectable 25 Gram(s) IV Push once  glucagon  Injectable 1 milliGRAM(s) IntraMuscular once  heparin   Injectable 5000 Unit(s) SubCutaneous every 8 hours  insulin lispro (ADMELOG) corrective regimen sliding scale   SubCutaneous three times a day before meals  labetalol 100 milliGRAM(s) Oral three times a day  lidocaine   4% Patch 1 Patch Transdermal daily  meropenem  IVPB 500 milliGRAM(s) IV Intermittent every 12 hours      Allergies    No Known Allergies    Intolerances        SOCIAL HISTORY:  Denies alcohol abuse, drug abuse or tobacco usage.     FAMILY HISTORY:      VITALS:  T(C): 36.6 (03-08-24 @ 09:13), Max: 37.5 (03-07-24 @ 17:00)  HR: 104 (03-08-24 @ 09:13) (91 - 108)  BP: 151/80 (03-08-24 @ 09:13) (151/80 - 169/109)  RR: 18 (03-08-24 @ 09:13) (18 - 18)  SpO2: 96% (03-08-24 @ 09:13) (95% - 100%)    REVIEW OF SYSTEMS:  Denies any nausea, vomiting, diarrhea, fever or chills. Denies chest pain, SOB, focal weakness, hematuria or dysuria. Good oral intake and denies fatigue or weakness. All other pertinent systems are reviewed and are negative.    PHYSICAL EXAM:  Constitutional: NAD  HEENT: EOMI  Neck:  No JVD, supple   Respiratory: CTA B/L  Cardiovascular: S1 and S2, RRR  Gastrointestinal: + BS, soft, NT, ND  Extremities: No peripheral edema, + peripheral pulses  Neurological: A/O x 3, CN2-12 intact  Psychiatric: Normal mood, normal affect  : No Carbajal  Skin: No rashes, C/D/I  Access: Not applicable    I and O's:    03-06 @ 07:01  -  03-07 @ 07:00  --------------------------------------------------------  IN: 2315 mL / OUT: 3800 mL / NET: -1485 mL    03-07 @ 07:01  -  03-08 @ 07:00  --------------------------------------------------------  IN: 1305 mL / OUT: 2700 mL / NET: -1395 mL    03-08 @ 07:01  -  03-08 @ 12:03  --------------------------------------------------------  IN: 465 mL / OUT: 300 mL / NET: 165 mL          LABS:                        14.8   14.42 )-----------( 164      ( 08 Mar 2024 07:20 )             46.2     03-08    136  |  103  |  24<H>  ----------------------------<  133<H>  4.3   |  21<L>  |  2.31<H>    Ca    8.7      08 Mar 2024 07:20        URINE:  Urinalysis Basic - ( 08 Mar 2024 07:20 )    Color: x / Appearance: x / SG: x / pH: x  Gluc: 133 mg/dL / Ketone: x  / Bili: x / Urobili: x   Blood: x / Protein: x / Nitrite: x   Leuk Esterase: x / RBC: x / WBC x   Sq Epi: x / Non Sq Epi: x / Bacteria: x        RADIOLOGY & ADDITIONAL STUDIES:

## 2024-03-08 NOTE — CONSULT NOTE ADULT - ASSESSMENT
73 yr old male who speaks [place] Creole (used [employer] Pad  ) hx of Benign Hypertension, Diabetes Mellitus Type2 went to ER with left flank pain dx with stone for procedure   CKD stage 3 b   Possible metabolic acidosis   Kidney stone  73 yr old male who speaks [place] Creole (used [employer] Pad  ) hx of Benign Hypertension, Diabetes Mellitus Type2 went to ER with left flank pain dx with stone for procedure   CKD stage 3 b   Possible metabolic acidosis   Kidney stones     1 Renal - Suspect chronicity and the amount of proteinuria will predicate how much diabetic nephropathy he has   At some point check PTH and Phos  He will need ACE or ARB but this can be started as outpatient   Possible SGLT-2  2 [facility]-For now cont the labetolol.  If he shows up to office then will follow plan for above   3 -Outpt  follow up     Sayed [facility]   [employer] [facility]   [phone]

## 2024-03-08 NOTE — CONSULT NOTE ADULT - SUBJECTIVE AND OBJECTIVE BOX
NEPHROLOGY - NSN    Patient seen and examined.    HPI:  73 yr old male who speaks German Creole (used [employer] I Pad  ) hx of Benign Hypertension, Diabetes Mellitus Type2 went to ER with left flank pain dx with stone for procedure.    HTN : on Enalapril pt unclear as to dose stated 1tab daily then stated 2 tabs daily ,Instructed to see cardiologist Dr. Wong Li  [phone]) for evaluation pre op obtain note  Diabetes Mellitus type2 per pt takes Metformin when "he feels like it" does not check blood sugar  instructed last dose 3/5        POD#2, s/p PCNL    Patient had been given ertapenem at home for 5 days due to asymptomatic bacteriuria in the face of impending urologic procedure        PAST MEDICAL & SURGICAL HISTORY:  HTN (hypertension)      Arthrosis      Renal stone      Type 2 diabetes mellitus      Language barrier to communication      Welsh Creole  needed      Poor historian      Poor compliance with medication      S/P left knee surgery          MEDICATIONS  (STANDING):  dextrose 5% + sodium chloride 0.45%. 1000 milliLiter(s) (75 mL/Hr) IV Continuous <Continuous>  dextrose 5%. 1000 milliLiter(s) (50 mL/Hr) IV Continuous <Continuous>  dextrose 5%. 1000 milliLiter(s) (100 mL/Hr) IV Continuous <Continuous>  dextrose 50% Injectable 25 Gram(s) IV Push once  dextrose 50% Injectable 12.5 Gram(s) IV Push once  dextrose 50% Injectable 25 Gram(s) IV Push once  glucagon  Injectable 1 milliGRAM(s) IntraMuscular once  heparin   Injectable 5000 Unit(s) SubCutaneous every 8 hours  insulin lispro (ADMELOG) corrective regimen sliding scale   SubCutaneous three times a day before meals  labetalol 100 milliGRAM(s) Oral three times a day  lidocaine   4% Patch 1 Patch Transdermal daily  meropenem  IVPB 500 milliGRAM(s) IV Intermittent every 12 hours      Allergies    No Known Allergies    Intolerances        SOCIAL HISTORY:  Denies alcohol abuse, drug abuse or tobacco usage.     FAMILY HISTORY:      VITALS:  T(C): 36.6 (03-08-24 @ 09:13), Max: 37.5 (03-07-24 @ 17:00)  HR: 104 (03-08-24 @ 09:13) (91 - 108)  BP: 151/80 (03-08-24 @ 09:13) (151/80 - 169/109)  RR: 18 (03-08-24 @ 09:13) (18 - 18)  SpO2: 96% (03-08-24 @ 09:13) (95% - 100%)    REVIEW OF SYSTEMS:   Good oral intake and denies fatigue or weakness. All other pertinent systems are reviewed and are negative.    PHYSICAL EXAM:  Constitutional: NAD  HEENT: EOMI  Neck:  No JVD, supple   Respiratory: CTA B/L  Cardiovascular: S1 and S2, RRR  Gastrointestinal: + BS, soft, NT, ND  Extremities: No peripheral edema, + peripheral pulses  Neurological: A/O x 3, CN2-12 intact  Psychiatric: Normal mood, normal affect  : No Carbajal  Skin: No rashes, C/D/I  Access: Not applicable    I and O's:    03-06 @ 07:01  -  03-07 @ 07:00  --------------------------------------------------------  IN: 2315 mL / OUT: 3800 mL / NET: -1485 mL    03-07 @ 07:01  -  03-08 @ 07:00  --------------------------------------------------------  IN: 1305 mL / OUT: 2700 mL / NET: -1395 mL    03-08 @ 07:01  -  03-08 @ 12:12  --------------------------------------------------------  IN: 465 mL / OUT: 300 mL / NET: 165 mL          LABS:                        14.8   14.42 )-----------( 164      ( 08 Mar 2024 07:20 )             46.2     03-08    136  |  103  |  24<H>  ----------------------------<  133<H>  4.3   |  21<L>  |  2.31<H>    Ca    8.7      08 Mar 2024 07:20        URINE:  Urinalysis Basic - ( 08 Mar 2024 07:20 )    Color: x / Appearance: x / SG: x / pH: x  Gluc: 133 mg/dL / Ketone: x  / Bili: x / Urobili: x   Blood: x / Protein: x / Nitrite: x   Leuk Esterase: x / RBC: x / WBC x   Sq Epi: x / Non Sq Epi: x / Bacteria: x        RADIOLOGY & ADDITIONAL STUDIES:    < from: CT Abdomen and Pelvis No Cont (03.07.24 @ 13:23) >    ACC: [id number] EXAM:  CT ABDOMEN AND PELVIS   ORDERED BY: ALMA HUBER     PROCEDURE DATE:  03/07/2024          INTERPRETATION:  CLINICAL INFORMATION: Left percutaneous nephrolithotomy   nephroureteral tube placement.    COMPARISON: September 5, 2023.    CONTRAST/COMPLICATIONS:  IV Contrast: NONE  Oral Contrast: NONE  Complications: None reported at time of study completion    PROCEDURE:  CT of the Abdomen and Pelvis was performed.  Sagittal and coronal reformats were performed.    FINDINGS:  LOWER CHEST: Small bilateral pleural effusions.    LIVER: Within normal limits.  BILE DUCTS: Normal caliber.  GALLBLADDER: Within normal limits.  SPLEEN: Within normal limits.  PANCREAS: Within normal limits.  ADRENALS: Within normal limits.  KIDNEYS/URETERS: No hydronephrosis. Left percutaneous nephroureterostomy   tube. Small amount of air in the left perinephric space, probably   postprocedural. Small amount of air is also noted in the left renal   collecting system. Decreased left renal stoneburden. Nonobstructing left   renal calculi, including conglomerate calculus in the left lower pole,   measuring 1.1 x 0.8 cm.    BLADDER: Within normal limits.  REPRODUCTIVE ORGANS: Prostate is enlarged.    BOWEL: No bowel obstruction. Appendix is normal. Colonic diverticulosis.  PERITONEUM: No ascites.  VESSELS: Atherosclerotic changes.  RETROPERITONEUM/LYMPH NODES: No lymphadenopathy.  ABDOMINAL WALL: Within normal limits.  BONES: Degenerative changes.    IMPRESSION:  Small amount of air in the left perinephric space, probably   postprocedural.    Decreased left renal stone burden. Nonobstructing left renal calculi,   including conglomerate calculus in the left lower pole, measuring 1.1 x   0.8 cm.    --- End of Report ---            TEODORO GUERRERO MD; Attending Radiologist  This document has been electronically signed. Mar      < end of copied text >   NEPHROLOGY - NSN    Patient seen and examined.    HPI:  73 yr old male who speaks New Zealander Creole (used [employer]  ) hx of Benign Hypertension, Diabetes Mellitus Type2 went to ER with left flank pain dx with stone for procedure.    HTN : on Enalapril pt unclear as to dose stated 1tab daily then stated 2 tabs daily ,Instructed to see cardiologist Dr. Wong Li  [phone]) for evaluation pre op obtain note  Diabetes Mellitus type2 per pt takes Metformin when "he feels like it" does not check blood sugar  instructed last dose 3/5        POD#2, s/p PCNL    Patient had been given ertapenem at home for 5 days due to asymptomatic bacteriuria in the face of impending urologic procedure    Pt is unaware of any issues but he is a poor historian     PAST MEDICAL & SURGICAL HISTORY:  HTN (hypertension)      Arthrosis      Renal stone      Type 2 diabetes mellitus      Language barrier to communication      Colombian Creole  needed      Poor historian      Poor compliance with medication      S/P left knee surgery          MEDICATIONS  (STANDING):  dextrose 5% + sodium chloride 0.45%. 1000 milliLiter(s) (75 mL/Hr) IV Continuous <Continuous>  dextrose 5%. 1000 milliLiter(s) (50 mL/Hr) IV Continuous <Continuous>  dextrose 5%. 1000 milliLiter(s) (100 mL/Hr) IV Continuous <Continuous>  dextrose 50% Injectable 25 Gram(s) IV Push once  dextrose 50% Injectable 12.5 Gram(s) IV Push once  dextrose 50% Injectable 25 Gram(s) IV Push once  glucagon  Injectable 1 milliGRAM(s) IntraMuscular once  heparin   Injectable 5000 Unit(s) SubCutaneous every 8 hours  insulin lispro (ADMELOG) corrective regimen sliding scale   SubCutaneous three times a day before meals  labetalol 100 milliGRAM(s) Oral three times a day  lidocaine   4% Patch 1 Patch Transdermal daily  meropenem  IVPB 500 milliGRAM(s) IV Intermittent every 12 hours      Allergies    No Known Allergies    Intolerances        SOCIAL HISTORY:  Denies alcohol abuse, drug abuse or tobacco usage.     FAMILY HISTORY:      VITALS:  T(C): 36.6 (03-08-24 @ 09:13), Max: 37.5 (03-07-24 @ 17:00)  HR: 104 (03-08-24 @ 09:13) (91 - 108)  BP: 151/80 (03-08-24 @ 09:13) (151/80 - 169/109)  RR: 18 (03-08-24 @ 09:13) (18 - 18)  SpO2: 96% (03-08-24 @ 09:13) (95% - 100%)    REVIEW OF SYSTEMS:   Good oral intake and denies fatigue or weakness. All other pertinent systems are reviewed and are negative.    PHYSICAL EXAM:  Constitutional: NAD  HEENT: EOMI  Neck:  No JVD, supple   Respiratory: CTA B/L  Cardiovascular: S1 and S2, RRR  Gastrointestinal: + BS, soft, NT, ND  Extremities: No peripheral edema, + peripheral pulses  Neurological: A/O x 3, CN2-12 intact  Psychiatric: Normal mood, normal affect  : No Carbajal + left perc   Skin: No rashes, C/D/I  Access: Not applicable    I and O's:    03-06 @ 07:01  -  03-07 @ 07:00  --------------------------------------------------------  IN: 2315 mL / OUT: 3800 mL / NET: -1485 mL    03-07 @ 07:01  -  03-08 @ 07:00  --------------------------------------------------------  IN: 1305 mL / OUT: 2700 mL / NET: -1395 mL    03-08 @ 07:01  -  03-08 @ 12:12  --------------------------------------------------------  IN: 465 mL / OUT: 300 mL / NET: 165 mL          LABS:                        14.8   14.42 )-----------( 164      ( 08 Mar 2024 07:20 )             46.2     03-08    136  |  103  |  24<H>  ----------------------------<  133<H>  4.3   |  21<L>  |  2.31<H>    Ca    8.7      08 Mar 2024 07:20        URINE:  Urinalysis Basic - ( 08 Mar 2024 07:20 )    Color: x / Appearance: x / SG: x / pH: x  Gluc: 133 mg/dL / Ketone: x  / Bili: x / Urobili: x   Blood: x / Protein: x / Nitrite: x   Leuk Esterase: x / RBC: x / WBC x   Sq Epi: x / Non Sq Epi: x / Bacteria: x        RADIOLOGY & ADDITIONAL STUDIES:    < from: CT Abdomen and Pelvis No Cont (03.07.24 @ 13:23) >    ACC: [id number] EXAM:  CT ABDOMEN AND PELVIS   ORDERED BY: ALMA HUBER     PROCEDURE DATE:  03/07/2024          INTERPRETATION:  CLINICAL INFORMATION: Left percutaneous nephrolithotomy   nephroureteral tube placement.    COMPARISON: September 5, 2023.    CONTRAST/COMPLICATIONS:  IV Contrast: NONE  Oral Contrast: NONE  Complications: None reported at time of study completion    PROCEDURE:  CT of the Abdomen and Pelvis was performed.  Sagittal and coronal reformats were performed.    FINDINGS:  LOWER CHEST: Small bilateral pleural effusions.    LIVER: Within normal limits.  BILE DUCTS: Normal caliber.  GALLBLADDER: Within normal limits.  SPLEEN: Within normal limits.  PANCREAS: Within normal limits.  ADRENALS: Within normal limits.  KIDNEYS/URETERS: No hydronephrosis. Left percutaneous nephroureterostomy   tube. Small amount of air in the left perinephric space, probably   postprocedural. Small amount of air is also noted in the left renal   collecting system. Decreased left renal stoneburden. Nonobstructing left   renal calculi, including conglomerate calculus in the left lower pole,   measuring 1.1 x 0.8 cm.    BLADDER: Within normal limits.  REPRODUCTIVE ORGANS: Prostate is enlarged.    BOWEL: No bowel obstruction. Appendix is normal. Colonic diverticulosis.  PERITONEUM: No ascites.  VESSELS: Atherosclerotic changes.  RETROPERITONEUM/LYMPH NODES: No lymphadenopathy.  ABDOMINAL WALL: Within normal limits.  BONES: Degenerative changes.    IMPRESSION:  Small amount of air in the left perinephric space, probably   postprocedural.    Decreased left renal stone burden. Nonobstructing left renal calculi,   including conglomerate calculus in the left lower pole, measuring 1.1 x   0.8 cm.    --- End of Report ---            TEODORO GUERRERO MD; Attending Radiologist  This document has been electronically signed. Mar      < end of copied text >

## 2024-03-08 NOTE — DISCHARGE NOTE NURSING/CASE MANAGEMENT/SOCIAL WORK - PATIENT PORTAL LINK FT
The Qutenza patch is contraindicated for the face or scalp, the company actually has made a statement against using it on the face or the scalp.  Unfortunately, in my experience this patch has not worked all that well and I have found instances of severe burns that occur with the patch without any resolution of the postherpetic neuralgia.  I'm not sure what else to offer other than medications, he may want to make an appointment with our facial pain specialist, Dr. Sheehan.  
         What about scrambler therapy,  Electrical signals on the affected areas to retrain the nerve endings.   I am looking at all options.  Multiple types of medications do not work and have side effects.   Maybe try a nerve block again?   Is there anyway to kill the nerves?   I would rather have no feeling in that area than deal with the continual pain.         
You can access the FollowMyHealth Patient Portal offered by [facility] by registering at the following website: [email or web address]. By joining [employer]’s FollowMyHealth portal, you will also be able to view your health information using other applications (apps) compatible with our system.

## 2024-03-08 NOTE — DISCHARGE NOTE PROVIDER - CARE PROVIDER_API CALL
Tom Patterson  Urology  [address], [address]  [place] [address]  Phone: [phone]  Fax: [phone]  Scheduled Appointment: 03/15/2024

## 2024-03-09 LAB
ANION GAP SERPL CALC-SCNC: 11 MMOL/L — SIGNIFICANT CHANGE UP (ref 5–17)
BUN SERPL-MCNC: 24 MG/DL — HIGH (ref 7–23)
CALCIUM SERPL-MCNC: 8.5 MG/DL — SIGNIFICANT CHANGE UP (ref 8.4–10.5)
CELL MATERIAL STONE EST-MCNT: SIGNIFICANT CHANGE UP
CHLORIDE SERPL-SCNC: 103 MMOL/L — SIGNIFICANT CHANGE UP (ref 96–108)
CO2 SERPL-SCNC: 21 MMOL/L — LOW (ref 22–31)
CREAT SERPL-MCNC: 2.01 MG/DL — HIGH (ref 0.5–1.3)
EGFR: 34 ML/MIN/1.73M2 — LOW
GLUCOSE BLDC GLUCOMTR-MCNC: 148 MG/DL — HIGH (ref 70–99)
GLUCOSE BLDC GLUCOMTR-MCNC: 160 MG/DL — HIGH (ref 70–99)
GLUCOSE BLDC GLUCOMTR-MCNC: 162 MG/DL — HIGH (ref 70–99)
GLUCOSE BLDC GLUCOMTR-MCNC: 182 MG/DL — HIGH (ref 70–99)
GLUCOSE SERPL-MCNC: 130 MG/DL — HIGH (ref 70–99)
HCT VFR BLD CALC: 40.5 % — SIGNIFICANT CHANGE UP (ref 39–50)
HGB BLD-MCNC: 13.9 G/DL — SIGNIFICANT CHANGE UP (ref 13–17)
LABORATORY COMMENT REPORT: SIGNIFICANT CHANGE UP
MCHC RBC-ENTMCNC: 28.8 PG — SIGNIFICANT CHANGE UP (ref 27–34)
MCHC RBC-ENTMCNC: 34.3 GM/DL — SIGNIFICANT CHANGE UP (ref 32–36)
MCV RBC AUTO: 83.9 FL — SIGNIFICANT CHANGE UP (ref 80–100)
NIDUS STONE QN: SIGNIFICANT CHANGE UP
NRBC # BLD: 0 /100 WBCS — SIGNIFICANT CHANGE UP (ref 0–0)
PLATELET # BLD AUTO: 227 K/UL — SIGNIFICANT CHANGE UP (ref 150–400)
POTASSIUM SERPL-MCNC: 4.2 MMOL/L — SIGNIFICANT CHANGE UP (ref 3.5–5.3)
POTASSIUM SERPL-SCNC: 4.2 MMOL/L — SIGNIFICANT CHANGE UP (ref 3.5–5.3)
RBC # BLD: 4.83 M/UL — SIGNIFICANT CHANGE UP (ref 4.2–5.8)
RBC # FLD: 13.7 % — SIGNIFICANT CHANGE UP (ref 10.3–14.5)
SODIUM SERPL-SCNC: 135 MMOL/L — SIGNIFICANT CHANGE UP (ref 135–145)
WBC # BLD: 10.79 K/UL — HIGH (ref 3.8–10.5)
WBC # FLD AUTO: 10.79 K/UL — HIGH (ref 3.8–10.5)

## 2024-03-09 PROCEDURE — 74018 RADEX ABDOMEN 1 VIEW: CPT | Mod: 26

## 2024-03-09 RX ORDER — SODIUM CHLORIDE 9 MG/ML
500 INJECTION, SOLUTION INTRAVENOUS ONCE
Refills: 0 | Status: COMPLETED | OUTPATIENT
Start: 2024-03-09 | End: 2024-03-09

## 2024-03-09 RX ORDER — LABETALOL HCL 100 MG
100 TABLET ORAL
Refills: 0 | Status: DISCONTINUED | OUTPATIENT
Start: 2024-03-09 | End: 2024-03-09

## 2024-03-09 RX ORDER — AMLODIPINE BESYLATE 2.5 MG/1
5 TABLET ORAL DAILY
Refills: 0 | Status: DISCONTINUED | OUTPATIENT
Start: 2024-03-09 | End: 2024-03-11

## 2024-03-09 RX ORDER — LABETALOL HCL 100 MG
100 TABLET ORAL THREE TIMES A DAY
Refills: 0 | Status: DISCONTINUED | OUTPATIENT
Start: 2024-03-09 | End: 2024-03-11

## 2024-03-09 RX ADMIN — AMLODIPINE BESYLATE 5 MILLIGRAM(S): 2.5 TABLET ORAL at 17:50

## 2024-03-09 RX ADMIN — LIDOCAINE 1 PATCH: 4 CREAM TOPICAL at 13:02

## 2024-03-09 RX ADMIN — Medication 100 MILLIGRAM(S): at 21:43

## 2024-03-09 RX ADMIN — Medication 975 MILLIGRAM(S): at 22:47

## 2024-03-09 RX ADMIN — Medication 1: at 17:32

## 2024-03-09 RX ADMIN — Medication 100 MILLIGRAM(S): at 09:45

## 2024-03-09 RX ADMIN — HEPARIN SODIUM 5000 UNIT(S): 5000 INJECTION INTRAVENOUS; SUBCUTANEOUS at 13:03

## 2024-03-09 RX ADMIN — Medication 1: at 12:51

## 2024-03-09 RX ADMIN — HEPARIN SODIUM 5000 UNIT(S): 5000 INJECTION INTRAVENOUS; SUBCUTANEOUS at 05:18

## 2024-03-09 RX ADMIN — Medication 975 MILLIGRAM(S): at 21:47

## 2024-03-09 RX ADMIN — MEROPENEM 100 MILLIGRAM(S): 1 INJECTION INTRAVENOUS at 21:43

## 2024-03-09 RX ADMIN — LIDOCAINE 1 PATCH: 4 CREAM TOPICAL at 19:00

## 2024-03-09 RX ADMIN — SENNA PLUS 2 TABLET(S): 8.6 TABLET ORAL at 13:03

## 2024-03-09 RX ADMIN — MEROPENEM 100 MILLIGRAM(S): 1 INJECTION INTRAVENOUS at 09:50

## 2024-03-09 RX ADMIN — LIDOCAINE 1 PATCH: 4 CREAM TOPICAL at 02:00

## 2024-03-09 RX ADMIN — Medication 100 MILLIGRAM(S): at 05:17

## 2024-03-09 RX ADMIN — SODIUM CHLORIDE 666.67 MILLILITER(S): 9 INJECTION, SOLUTION INTRAVENOUS at 14:00

## 2024-03-09 RX ADMIN — SODIUM CHLORIDE 75 MILLILITER(S): 9 INJECTION, SOLUTION INTRAVENOUS at 11:03

## 2024-03-09 RX ADMIN — HEPARIN SODIUM 5000 UNIT(S): 5000 INJECTION INTRAVENOUS; SUBCUTANEOUS at 21:43

## 2024-03-09 NOTE — PROGRESS NOTE ADULT - SUBJECTIVE AND OBJECTIVE BOX
The patient was seen and examined at bedside.  [employer]  was used.   Patient stayed due to concern for low BP. BPs now improved this AM.  Only complaint is burning with urination     T(C): 36.6 (03-08-24 @ 05:30), Max: 37.5 (03-07-24 @ 17:00)  HR: 94 (03-08-24 @ 05:30) (91 - 108)  BP: 168/96 (03-08-24 @ 05:30) (158/93 - 169/109)  RR: 18 (03-08-24 @ 05:30) (18 - 18)  SpO2: 95% (03-08-24 @ 05:30) (95% - 100%)  Wt(kg): --    Physical Exam:    General: NAD, A+Ox3  Abdomen: soft, non-tender, non-distended  Back: dressing with serosanguinous drainage and was changed this AM- output clear yellow       03-07 @ 07:01  -  03-08 @ 07:00  --------------------------------------------------------  IN: 1080 mL / OUT: 2700 mL / NET: -1620 mL      void - 1450cc, red    L NT - no output

## 2024-03-09 NOTE — PROGRESS NOTE ADULT - ASSESSMENT
73 year old male s/p left PCNL, POD#1    -AM labs reviewed   -analgesia as needed  -monitor I's and O's  -OOB/DVT prophylaxis  -discharge planning today with nephrostomy tube capped

## 2024-03-10 LAB
ANION GAP SERPL CALC-SCNC: 8 MMOL/L — SIGNIFICANT CHANGE UP (ref 5–17)
BLD GP AB SCN SERPL QL: NEGATIVE — SIGNIFICANT CHANGE UP
BUN SERPL-MCNC: 23 MG/DL — SIGNIFICANT CHANGE UP (ref 7–23)
CALCIUM SERPL-MCNC: 8.5 MG/DL — SIGNIFICANT CHANGE UP (ref 8.4–10.5)
CHLORIDE SERPL-SCNC: 104 MMOL/L — SIGNIFICANT CHANGE UP (ref 96–108)
CO2 SERPL-SCNC: 24 MMOL/L — SIGNIFICANT CHANGE UP (ref 22–31)
CREAT SERPL-MCNC: 1.97 MG/DL — HIGH (ref 0.5–1.3)
EGFR: 35 ML/MIN/1.73M2 — LOW
GLUCOSE BLDC GLUCOMTR-MCNC: 125 MG/DL — HIGH (ref 70–99)
GLUCOSE BLDC GLUCOMTR-MCNC: 130 MG/DL — HIGH (ref 70–99)
GLUCOSE BLDC GLUCOMTR-MCNC: 156 MG/DL — HIGH (ref 70–99)
GLUCOSE BLDC GLUCOMTR-MCNC: 213 MG/DL — HIGH (ref 70–99)
GLUCOSE SERPL-MCNC: 130 MG/DL — HIGH (ref 70–99)
HCT VFR BLD CALC: 38.7 % — LOW (ref 39–50)
HGB BLD-MCNC: 12.6 G/DL — LOW (ref 13–17)
MCHC RBC-ENTMCNC: 27.8 PG — SIGNIFICANT CHANGE UP (ref 27–34)
MCHC RBC-ENTMCNC: 32.6 GM/DL — SIGNIFICANT CHANGE UP (ref 32–36)
MCV RBC AUTO: 85.4 FL — SIGNIFICANT CHANGE UP (ref 80–100)
NRBC # BLD: 0 /100 WBCS — SIGNIFICANT CHANGE UP (ref 0–0)
PLATELET # BLD AUTO: 259 K/UL — SIGNIFICANT CHANGE UP (ref 150–400)
POTASSIUM SERPL-MCNC: 4.1 MMOL/L — SIGNIFICANT CHANGE UP (ref 3.5–5.3)
POTASSIUM SERPL-SCNC: 4.1 MMOL/L — SIGNIFICANT CHANGE UP (ref 3.5–5.3)
RBC # BLD: 4.53 M/UL — SIGNIFICANT CHANGE UP (ref 4.2–5.8)
RBC # FLD: 13.5 % — SIGNIFICANT CHANGE UP (ref 10.3–14.5)
RH IG SCN BLD-IMP: POSITIVE — SIGNIFICANT CHANGE UP
SODIUM SERPL-SCNC: 136 MMOL/L — SIGNIFICANT CHANGE UP (ref 135–145)
WBC # BLD: 9.3 K/UL — SIGNIFICANT CHANGE UP (ref 3.8–10.5)
WBC # FLD AUTO: 9.3 K/UL — SIGNIFICANT CHANGE UP (ref 3.8–10.5)

## 2024-03-10 RX ORDER — INSULIN LISPRO 100/ML
VIAL (ML) SUBCUTANEOUS
Refills: 0 | Status: DISCONTINUED | OUTPATIENT
Start: 2024-03-10 | End: 2024-03-11

## 2024-03-10 RX ORDER — INSULIN LISPRO 100/ML
VIAL (ML) SUBCUTANEOUS AT BEDTIME
Refills: 0 | Status: DISCONTINUED | OUTPATIENT
Start: 2024-03-10 | End: 2024-03-11

## 2024-03-10 RX ADMIN — Medication 1: at 17:51

## 2024-03-10 RX ADMIN — HEPARIN SODIUM 5000 UNIT(S): 5000 INJECTION INTRAVENOUS; SUBCUTANEOUS at 21:09

## 2024-03-10 RX ADMIN — LIDOCAINE 1 PATCH: 4 CREAM TOPICAL at 13:01

## 2024-03-10 RX ADMIN — SODIUM CHLORIDE 75 MILLILITER(S): 9 INJECTION, SOLUTION INTRAVENOUS at 21:11

## 2024-03-10 RX ADMIN — AMLODIPINE BESYLATE 5 MILLIGRAM(S): 2.5 TABLET ORAL at 10:22

## 2024-03-10 RX ADMIN — Medication 100 MILLIGRAM(S): at 05:58

## 2024-03-10 RX ADMIN — HEPARIN SODIUM 5000 UNIT(S): 5000 INJECTION INTRAVENOUS; SUBCUTANEOUS at 05:59

## 2024-03-10 RX ADMIN — Medication 2: at 10:21

## 2024-03-10 RX ADMIN — MEROPENEM 100 MILLIGRAM(S): 1 INJECTION INTRAVENOUS at 10:21

## 2024-03-10 RX ADMIN — HEPARIN SODIUM 5000 UNIT(S): 5000 INJECTION INTRAVENOUS; SUBCUTANEOUS at 13:02

## 2024-03-10 RX ADMIN — Medication 100 MILLIGRAM(S): at 21:11

## 2024-03-10 RX ADMIN — LIDOCAINE 1 PATCH: 4 CREAM TOPICAL at 19:00

## 2024-03-10 RX ADMIN — MEROPENEM 100 MILLIGRAM(S): 1 INJECTION INTRAVENOUS at 21:09

## 2024-03-10 RX ADMIN — Medication 100 MILLIGRAM(S): at 13:02

## 2024-03-10 RX ADMIN — LIDOCAINE 1 PATCH: 4 CREAM TOPICAL at 01:47

## 2024-03-10 NOTE — PROGRESS NOTE ADULT - SUBJECTIVE AND OBJECTIVE BOX
UROLOGY DAILY PROGRESS NOTE:     Subjective: Patient seen and examined at bedside. Pt c/o dull flank pain.     Objective:  Vital signs  T(F): , Max: 98.6 (03-09-24 @ 21:15)  HR: 84 (03-10-24 @ 10:05)  BP: 172/81 (03-10-24 @ 10:05)  SpO2: 97% (03-10-24 @ 10:05)    I&O's Summary    09 Mar 2024 06:01  -  10 Mar 2024 07:00  --------------------------------------------------------  IN: 3545 mL / OUT: 3175 mL / NET: 370 mL    General: NAD, A+Ox3  Abdomen: soft, non-tender, non-distended  Back: dressing C/D/I     Labs:  03-10  9.30  / 38.7  /1.97   03-09  10.79 / 40.5  /2.01                           12.6   9.30  )-----------( 259      ( 10 Mar 2024 07:48 )             38.7     03-10    136  |  104  |  23  ----------------------------<  130<H>  4.1   |  24  |  1.97<H>    Ca    8.5      10 Mar 2024 07:48

## 2024-03-10 NOTE — PROGRESS NOTE ADULT - ASSESSMENT
73 year old male s/p left PCNL    -Monitor BP  -Follow up medicine recs   -Dulcolax   -Analgesia as needed  -Monitor I's and O's  -OOB/DVT prophylaxis

## 2024-03-11 VITALS — SYSTOLIC BLOOD PRESSURE: 168 MMHG | DIASTOLIC BLOOD PRESSURE: 96 MMHG | HEART RATE: 73 BPM

## 2024-03-11 LAB
ANION GAP SERPL CALC-SCNC: 10 MMOL/L — SIGNIFICANT CHANGE UP (ref 5–17)
BUN SERPL-MCNC: 23 MG/DL — SIGNIFICANT CHANGE UP (ref 7–23)
CALCIUM SERPL-MCNC: 8.5 MG/DL — SIGNIFICANT CHANGE UP (ref 8.4–10.5)
CHLORIDE SERPL-SCNC: 104 MMOL/L — SIGNIFICANT CHANGE UP (ref 96–108)
CO2 SERPL-SCNC: 22 MMOL/L — SIGNIFICANT CHANGE UP (ref 22–31)
CREAT SERPL-MCNC: 2.02 MG/DL — HIGH (ref 0.5–1.3)
EGFR: 34 ML/MIN/1.73M2 — LOW
GLUCOSE BLDC GLUCOMTR-MCNC: 139 MG/DL — HIGH (ref 70–99)
GLUCOSE BLDC GLUCOMTR-MCNC: 159 MG/DL — HIGH (ref 70–99)
GLUCOSE SERPL-MCNC: 127 MG/DL — HIGH (ref 70–99)
HCT VFR BLD CALC: 40 % — SIGNIFICANT CHANGE UP (ref 39–50)
HGB BLD-MCNC: 13.3 G/DL — SIGNIFICANT CHANGE UP (ref 13–17)
MCHC RBC-ENTMCNC: 27.9 PG — SIGNIFICANT CHANGE UP (ref 27–34)
MCHC RBC-ENTMCNC: 33.3 GM/DL — SIGNIFICANT CHANGE UP (ref 32–36)
MCV RBC AUTO: 84 FL — SIGNIFICANT CHANGE UP (ref 80–100)
NRBC # BLD: 0 /100 WBCS — SIGNIFICANT CHANGE UP (ref 0–0)
PLATELET # BLD AUTO: 273 K/UL — SIGNIFICANT CHANGE UP (ref 150–400)
POTASSIUM SERPL-MCNC: 4 MMOL/L — SIGNIFICANT CHANGE UP (ref 3.5–5.3)
POTASSIUM SERPL-SCNC: 4 MMOL/L — SIGNIFICANT CHANGE UP (ref 3.5–5.3)
RBC # BLD: 4.76 M/UL — SIGNIFICANT CHANGE UP (ref 4.2–5.8)
RBC # FLD: 13.3 % — SIGNIFICANT CHANGE UP (ref 10.3–14.5)
SODIUM SERPL-SCNC: 136 MMOL/L — SIGNIFICANT CHANGE UP (ref 135–145)
SURGICAL PATHOLOGY STUDY: SIGNIFICANT CHANGE UP
WBC # BLD: 7.74 K/UL — SIGNIFICANT CHANGE UP (ref 3.8–10.5)
WBC # FLD AUTO: 7.74 K/UL — SIGNIFICANT CHANGE UP (ref 3.8–10.5)

## 2024-03-11 PROCEDURE — 36415 COLL VENOUS BLD VENIPUNCTURE: CPT

## 2024-03-11 PROCEDURE — 85027 COMPLETE CBC AUTOMATED: CPT

## 2024-03-11 PROCEDURE — 93005 ELECTROCARDIOGRAM TRACING: CPT

## 2024-03-11 PROCEDURE — 87077 CULTURE AEROBIC IDENTIFY: CPT

## 2024-03-11 PROCEDURE — 87186 SC STD MICRODIL/AGAR DIL: CPT

## 2024-03-11 PROCEDURE — C1769: CPT

## 2024-03-11 PROCEDURE — 80048 BASIC METABOLIC PNL TOTAL CA: CPT

## 2024-03-11 PROCEDURE — 88300 SURGICAL PATH GROSS: CPT

## 2024-03-11 PROCEDURE — 82962 GLUCOSE BLOOD TEST: CPT

## 2024-03-11 PROCEDURE — 86900 BLOOD TYPING SEROLOGIC ABO: CPT

## 2024-03-11 PROCEDURE — 86850 RBC ANTIBODY SCREEN: CPT

## 2024-03-11 PROCEDURE — C2617: CPT

## 2024-03-11 PROCEDURE — 87086 URINE CULTURE/COLONY COUNT: CPT

## 2024-03-11 PROCEDURE — 74018 RADEX ABDOMEN 1 VIEW: CPT

## 2024-03-11 PROCEDURE — 85025 COMPLETE CBC W/AUTO DIFF WBC: CPT

## 2024-03-11 PROCEDURE — C1889: CPT

## 2024-03-11 PROCEDURE — 71045 X-RAY EXAM CHEST 1 VIEW: CPT

## 2024-03-11 PROCEDURE — 76000 FLUOROSCOPY <1 HR PHYS/QHP: CPT

## 2024-03-11 PROCEDURE — C9399: CPT

## 2024-03-11 PROCEDURE — 74176 CT ABD & PELVIS W/O CONTRAST: CPT | Mod: MC

## 2024-03-11 PROCEDURE — 86901 BLOOD TYPING SEROLOGIC RH(D): CPT

## 2024-03-11 PROCEDURE — C1758: CPT

## 2024-03-11 PROCEDURE — 82365 CALCULUS SPECTROSCOPY: CPT

## 2024-03-11 RX ORDER — AMLODIPINE BESYLATE 2.5 MG/1
1 TABLET ORAL
Qty: 30 | Refills: 0
Start: 2024-03-11 | End: 2024-04-09

## 2024-03-11 RX ORDER — PHENAZOPYRIDINE HCL 100 MG
200 TABLET ORAL THREE TIMES A DAY
Refills: 0 | Status: DISCONTINUED | OUTPATIENT
Start: 2024-03-11 | End: 2024-03-11

## 2024-03-11 RX ADMIN — Medication 1: at 09:51

## 2024-03-11 RX ADMIN — AMLODIPINE BESYLATE 5 MILLIGRAM(S): 2.5 TABLET ORAL at 10:13

## 2024-03-11 RX ADMIN — LIDOCAINE 1 PATCH: 4 CREAM TOPICAL at 01:30

## 2024-03-11 RX ADMIN — Medication 100 MILLIGRAM(S): at 06:05

## 2024-03-11 RX ADMIN — MEROPENEM 100 MILLIGRAM(S): 1 INJECTION INTRAVENOUS at 09:56

## 2024-03-11 RX ADMIN — HEPARIN SODIUM 5000 UNIT(S): 5000 INJECTION INTRAVENOUS; SUBCUTANEOUS at 06:05

## 2024-03-11 RX ADMIN — LIDOCAINE 1 PATCH: 4 CREAM TOPICAL at 11:46

## 2024-03-11 NOTE — PROGRESS NOTE ADULT - PROVIDER SPECIALTY LIST ADULT
Infectious Disease
Infectious Disease
Urology
Urology
Internal Medicine
Urology
Nephrology

## 2024-03-11 NOTE — PROGRESS NOTE ADULT - ASSESSMENT
73 yr old male who speaks Trinidadian Creole (used [employer]  ) hx of Benign Hypertension, Diabetes Mellitus Type2 went to ER with left flank pain dx with stone for procedure   CKD stage 3 b   Possible metabolic acidosis   Kidney stones   Ecoli ESBL     1 Renal - Suspect chronicity and the amount of proteinuria will predicate how much diabetic nephropathy he has   At some point check PTH and Phos  He will need ACE or ARB but this can be started as outpatient   Possible SGLT-2  2 CVS-For now cont the labetolol and the norvasc.  Norvasc takes 3 weeks for maximal effect  3 -Outpt  follow up;  He will leave with the nephrostomy unclamped   4 ID- DC IV abx and allow perc to drain    Dc planning and outpt follow up     DW ID attd  DW hospitalist   DW Nurse    >60 minutes spent on total encounter and more then 50% of the visit was spent counseling and/or coordinating care by the attending physician     [facility]   [phone]     [facility]   [phone]

## 2024-03-11 NOTE — PROGRESS NOTE ADULT - ASSESSMENT
73 year old male s/p L PCNL, POD#5; post operative course complicated by an episode of hypotension as well as hypertension    -AM labs  -f/u medicine and nephrology recommendations for finalized BP management plan  -monitor BP  -monitor I's and O's  -keep left NT to drainage  -OOB/DVT prophylaxis/incentive spirometry  -discharge planning today with L NT to drainage

## 2024-03-11 NOTE — PROGRESS NOTE ADULT - SUBJECTIVE AND OBJECTIVE BOX
NEPHROLOGY-[facility] [phone]        Patient seen and examined in bed.  He has the perc and draining         MEDICATIONS  (STANDING):  amLODIPine   Tablet 5 milliGRAM(s) Oral daily  bisacodyl Suppository 10 milliGRAM(s) Rectal once  dextrose 5% + sodium chloride 0.45%. 1000 milliLiter(s) (75 mL/Hr) IV Continuous <Continuous>  dextrose 5%. 1000 milliLiter(s) (50 mL/Hr) IV Continuous <Continuous>  dextrose 5%. 1000 milliLiter(s) (100 mL/Hr) IV Continuous <Continuous>  dextrose 50% Injectable 25 Gram(s) IV Push once  dextrose 50% Injectable 12.5 Gram(s) IV Push once  dextrose 50% Injectable 25 Gram(s) IV Push once  glucagon  Injectable 1 milliGRAM(s) IntraMuscular once  heparin   Injectable 5000 Unit(s) SubCutaneous every 8 hours  insulin lispro (ADMELOG) corrective regimen sliding scale   SubCutaneous three times a day before meals  insulin lispro (ADMELOG) corrective regimen sliding scale   SubCutaneous at bedtime  labetalol 100 milliGRAM(s) Oral three times a day  lidocaine   4% Patch 1 Patch Transdermal daily  meropenem  IVPB 500 milliGRAM(s) IV Intermittent every 12 hours      VITAL:  T(C): , Max: 37.1 (03-10-24 @ 21:13)  T(F): , Max: 98.7 (03-10-24 @ 21:13)  HR: 70 (03-11-24 @ 05:05)  BP: 158/83 (03-11-24 @ 05:05)  BP(mean): --  RR: 18 (03-11-24 @ 05:05)  SpO2: 96% (03-11-24 @ 05:05)  Wt(kg): --    I and O's:    03-10 @ 07:01  -  03-11 @ 07:00  --------------------------------------------------------  IN: 2990 mL / OUT: 2900 mL / NET: 90 mL    03-11 @ 07:01  -  03-11 @ 09:00  --------------------------------------------------------  IN: 0 mL / OUT: 250 mL / NET: -250 mL          PHYSICAL EXAM:    Constitutional: NAD  Neck:  No JVD  Respiratory: CTAB/L  Cardiovascular: S1 and S2  Gastrointestinal: BS+, soft, NT/ND  Extremities: No peripheral edema  Neurological: A/O x 3, no focal deficits  Psychiatric: Normal mood, normal affect  : No Carbajal + perc left   Skin: No rashes  Access: Not applicable    LABS:                        13.3   7.74  )-----------( 273      ( 11 Mar 2024 07:00 )             40.0     03-11    136  |  104  |  23  ----------------------------<  127<H>  4.0   |  22  |  2.02<H>    Ca    8.5      11 Mar 2024 06:51            Urine Studies:  Urinalysis Basic - ( 11 Mar 2024 06:51 )    Color: x / Appearance: x / SG: x / pH: x  Gluc: 127 mg/dL / Ketone: x  / Bili: x / Urobili: x   Blood: x / Protein: x / Nitrite: x   Leuk Esterase: x / RBC: x / WBC x   Sq Epi: x / Non Sq Epi: x / Bacteria: x            RADIOLOGY & ADDITIONAL STUDIES:        ACC: [id number] EXAM:  XR KUB 1 VIEW   ORDERED BY: DONNA SALGADO     PROCEDURE DATE:  03/09/2024          INTERPRETATION:  CLINICAL INDICATION: Status post percutaneous   nephroureteral ureteral stent placement    TECHNIQUE: Frontal view(s) of the abdomen.    COMPARISON: CT abdomen/pelvis 3/7/2024    FINDINGS:  Contrast noted within the left renal collecting system and bladder with   left nephroureteral stent in place.  Bowel gas pattern is unremarkable. No evidence of obstruction.  Visualized osseous structures are unremarkable.    IMPRESSION:  Contrast noted within the left renal collecting system and bladder with   nephroureteral stent in place.    --- End of Report ---           CRYSTAL LEI DO; Resident Radiologist  This document has been electronically signed.  PURA JIMENEZ DO; Attending Radiologist  This document has been electro

## 2024-03-11 NOTE — PROGRESS NOTE ADULT - SUBJECTIVE AND OBJECTIVE BOX
The patient was seen and examined at bedside.  No acute events overnight and the patient is without acute complaints this AM.  States that he still has a burning sensation upon urination and has hematuria.    T(C): 36.8 (03-11-24 @ 05:05), Max: 37.1 (03-10-24 @ 21:13)  HR: 70 (03-11-24 @ 05:05) (70 - 95)  BP: 158/83 (03-11-24 @ 05:05) (134/72 - 178/96)  RR: 18 (03-11-24 @ 05:05) (17 - 18)  SpO2: 96% (03-11-24 @ 05:05) (96% - 98%)  Wt(kg): --    Physical Exam:    General: NAD, A+Ox3  Abdomen: soft, non-tender, non-distended  Back: +urostomy bag over the nephrostomy site; blood tinged urine draining from nephrostomy tube      03-10 @ 07:01  -  03-11 @ 07:00  --------------------------------------------------------  IN: 2990 mL / OUT: 2900 mL / NET: 90 mL    03-11 @ 07:01  -  03-11 @ 07:55  --------------------------------------------------------  IN: 0 mL / OUT: 250 mL / NET: -250 mL      void - 200cc    L NT - 1350cc blood-tinged

## 2024-03-12 ENCOUNTER — TRANSCRIPTION ENCOUNTER (OUTPATIENT)
Age: 74
End: 2024-03-12

## 2024-03-15 ENCOUNTER — APPOINTMENT (OUTPATIENT)
Dept: UROLOGY | Facility: CLINIC | Age: 74
End: 2024-03-15
Payer: MEDICARE

## 2024-03-15 ENCOUNTER — TRANSCRIPTION ENCOUNTER (OUTPATIENT)
Age: 74
End: 2024-03-15

## 2024-03-15 VITALS
SYSTOLIC BLOOD PRESSURE: 124 MMHG | TEMPERATURE: 97.9 F | RESPIRATION RATE: 16 BRPM | WEIGHT: 225 LBS | DIASTOLIC BLOOD PRESSURE: 65 MMHG | HEART RATE: 67 BPM | OXYGEN SATURATION: 96 % | HEIGHT: 72 IN | BODY MASS INDEX: 30.48 KG/M2

## 2024-03-15 DIAGNOSIS — N20.0 CALCULUS OF KIDNEY: ICD-10-CM

## 2024-03-15 PROCEDURE — 99024 POSTOP FOLLOW-UP VISIT: CPT

## 2024-03-15 NOTE — PHYSICAL EXAM
[General Appearance - Well Nourished] : well nourished [General Appearance - Well Developed] : well developed [Normal Appearance] : normal appearance [Well Groomed] : well groomed [General Appearance - In No Acute Distress] : no acute distress [Edema] : no peripheral edema [Respiration, Rhythm And Depth] : normal respiratory rhythm and effort [Abdomen Soft] : soft [Exaggerated Use Of Accessory Muscles For Inspiration] : no accessory muscle use [Abdomen Tenderness] : non-tender [FreeTextEntry1] : Kidney percussion test positive on the left, no pain reported on bi-manual palpation bilaterally, no pain on superficial and deep palpation on the iliac fossa bilaterally and on the ureteral points  [Costovertebral Angle Tenderness] : no ~M costovertebral angle tenderness [Urethral Meatus] : meatus normal [Urinary Bladder Findings] : the bladder was normal on palpation [Scrotum] : the scrotum was normal [Testes Mass (___cm)] : there were no testicular masses [No Prostate Nodules] : no prostate nodules [Normal Station and Gait] : the gait and station were normal for the patient's age [] : no rash [No Focal Deficits] : no focal deficits [Oriented To Time, Place, And Person] : oriented to person, place, and time [Mood] : the mood was normal [Affect] : the affect was normal [Not Anxious] : not anxious [No Palpable Adenopathy] : no palpable adenopathy

## 2024-03-15 NOTE — REVIEW OF SYSTEMS
[Urine Infection (bladder/kidney)] : bladder/kidney infection [Wake up at night to urinate  How many times?  ___] : wakes up to urinate [unfilled] times during the night [History of kidney stones] : history of kidney stones [Negative] : Heme/Lymph

## 2024-03-15 NOTE — REASON FOR VISIT
[[employer] ] : provided by [employer]   [Interpreters_IDNumber] : [id number] [Interpreters_FullName] : marnie [TWNoteComboBox1] : Diana

## 2024-03-15 NOTE — HISTORY OF PRESENT ILLNESS
[FreeTextEntry1] : 72 y/o male with h/o left staghorn kidney stone, involving the lower calices, treated with PCNL on 3/6/24. The patient refers today weakness. Has nephU on the left side. Denies fever. Had recurrent UTIs. Comes to office for F/U

## 2024-03-15 NOTE — ASSESSMENT
[FreeTextEntry1] : 74 y/o male with h/o left staghorn kidney stone, involving the lower calices, treated with PCNL on 3/6/24. The patient refers today weakness. Has nephU on the left side. Denies fever. Had recurrent UTIs. Comes to office for F/U  On the left side the NephU is dislocated and is therefore removed. On the wound a steristrip is applied and then covered with adhesive dressing.  The patient refers feeling better right after. Renewal of Flomax. F/U in 1 week with US renal.

## 2024-03-22 ENCOUNTER — APPOINTMENT (OUTPATIENT)
Dept: UROLOGY | Facility: CLINIC | Age: 74
End: 2024-03-22

## 2024-04-16 ENCOUNTER — APPOINTMENT (OUTPATIENT)
Dept: UROLOGY | Facility: CLINIC | Age: 74
End: 2024-04-16

## 2024-04-19 ENCOUNTER — APPOINTMENT (OUTPATIENT)
Dept: UROLOGY | Facility: CLINIC | Age: 74
End: 2024-04-19
Payer: MEDICARE

## 2024-04-19 VITALS
BODY MASS INDEX: 29.66 KG/M2 | RESPIRATION RATE: 16 BRPM | HEART RATE: 67 BPM | SYSTOLIC BLOOD PRESSURE: 165 MMHG | HEIGHT: 72 IN | DIASTOLIC BLOOD PRESSURE: 89 MMHG | WEIGHT: 219 LBS | TEMPERATURE: 97.9 F | OXYGEN SATURATION: 98 %

## 2024-04-19 DIAGNOSIS — R35.1 NOCTURIA: ICD-10-CM

## 2024-04-19 PROCEDURE — 76775 US EXAM ABDO BACK WALL LIM: CPT

## 2024-04-19 PROCEDURE — 99213 OFFICE O/P EST LOW 20 MIN: CPT | Mod: 25

## 2024-04-19 PROCEDURE — 99024 POSTOP FOLLOW-UP VISIT: CPT

## 2024-04-19 RX ORDER — TAMSULOSIN HYDROCHLORIDE 0.4 MG/1
0.4 CAPSULE ORAL
Qty: 30 | Refills: 0 | Status: ACTIVE | COMMUNITY
Start: 2024-03-15 | End: 1900-01-01

## 2024-04-20 LAB — PSA SERPL-MCNC: 11.5 NG/ML

## 2024-04-21 ENCOUNTER — NON-APPOINTMENT (OUTPATIENT)
Age: 74
End: 2024-04-21

## 2024-04-21 NOTE — ASSESSMENT
[FreeTextEntry1] : 74 y/o male with h/o left staghorn kidney stone, involving the lower calices, treated with PCNL on 3/6/24. The patient refers today weakness. Had nephU on the left side, the removed with no issues. Denies fever. Refers nocturia X5. On flomax for 3 years with poor results. Comes to office for PCNL F/U and LUTS  Renal US today: A 11 mm echogenic focus visualized in the lower pole of the left kidney with dilated lower pole calyces. The right kidney is unremarkable. Both kidneys are normal in size and echogenicity without solid masses visualized  Ordering US prostate to measure volume + cystoscopy to evaluate possible treatment. Renewal of flomax.

## 2024-04-21 NOTE — PHYSICAL EXAM
[facility] Care Transitions Follow Up Call    Care Transition Nurse contacted the patient by telephone. Verified name and  with patient as identifiers. Patient: Justice Ganser  Patient : 1966   MRN: [id number]  Reason for Admission: CHF   Discharge Date: 11/3/22 RARS: Readmission Risk Score: 10.7      Needs to be reviewed by the provider   Additional needs identified to be addressed with provider: No  none             Method of communication with provider: none. CTN spoke with patient who reported he is doing well and denied any SOB or swelling. Patient reported his weight remains stable 191 lbs. Patient denied any concerns at this time. Addressed changes since last contact:  none  Discussed follow-up appointments. If no appointment was previously scheduled, appointment scheduling offered: Yes. Is follow up appointment scheduled within 7 days of discharge? Yes. Follow Up  Future Appointments   Date Time Provider Patricia Rust   2022 11:30 AM DO LOWELL Aranda Cinci - DYD   2022  9:30 AM Amber White APRN - CNP [facility]   2022  9:30 AM SANCHEZ Carrizales - CNP [facility]   2022  9:40 AM Arnold Villa MD AND VINH MORALES   1/3/2023  8:15 AM Archer Romney, MD Christophe [facility]     Non-[facility] follow up appointment(s):     Care Transition Nurse reviewed medical action plan with patient and discussed any barriers to care and/or understanding of plan of care after discharge. Discussed appropriate site of care based on symptoms and resources available to patient including: PCP  Specialist  When to call 911. The patient agrees to contact the PCP office for questions related to their healthcare. Advance Care Planning:   reviewed and current. Patients top risk factors for readmission: medical condition-. Interventions to address risk factors: Education of patient/family/caregiver/guardian to support self-management-.     Offered patient
[FreeTextEntry1] : Kidney percussion test positive on the left, no pain reported on bi-manual palpation bilaterally, no pain on superficial and deep palpation on the iliac fossa bilaterally and on the ureteral points 
[FreeTextEntry1] : Kidney percussion test positive on the left, no pain reported on bi-manual palpation bilaterally, no pain on superficial and deep palpation on the iliac fossa bilaterally and on the ureteral points

## 2024-04-21 NOTE — HISTORY OF PRESENT ILLNESS
[FreeTextEntry1] : 72 y/o male with h/o left staghorn kidney stone, involving the lower calices, treated with PCNL on 3/6/24. The patient refers today weakness. Had nephU on the left side, the removed with no issues. Denies fever. Refers nocturia X5. On flomax for 3 years with poor results. Comes to office for PCNL F/U and LUTS

## 2024-04-21 NOTE — ASSESSMENT
[FreeTextEntry1] : 72 y/o male with h/o left staghorn kidney stone, involving the lower calices, treated with PCNL on 3/6/24. The patient refers today weakness. Had nephU on the left side, the removed with no issues. Denies fever. Refers nocturia X5. On flomax for 3 years with poor results. Comes to office for PCNL F/U and LUTS  Renal US today: A 11 mm echogenic focus visualized in the lower pole of the left kidney with dilated lower pole calyces. The right kidney is unremarkable. Both kidneys are normal in size and echogenicity without solid masses visualized  Ordering US prostate to measure volume + cystoscopy to evaluate possible treatment. Renewal of flomax.

## 2024-04-21 NOTE — HISTORY OF PRESENT ILLNESS
[FreeTextEntry1] : 74 y/o male with h/o left staghorn kidney stone, involving the lower calices, treated with PCNL on 3/6/24. The patient refers today weakness. Had nephU on the left side, the removed with no issues. Denies fever. Refers nocturia X5. On flomax for 3 years with poor results. Comes to office for PCNL F/U and LUTS

## 2024-04-23 LAB — BACTERIA UR CULT: NORMAL

## 2024-05-06 ENCOUNTER — APPOINTMENT (OUTPATIENT)
Dept: MRI IMAGING | Facility: CLINIC | Age: 74
End: 2024-05-06
Payer: MEDICARE

## 2024-05-06 ENCOUNTER — RESULT REVIEW (OUTPATIENT)
Age: 74
End: 2024-05-06

## 2024-05-06 PROCEDURE — A9585: CPT

## 2024-05-06 PROCEDURE — 72197 MRI PELVIS W/O & W/DYE: CPT

## 2024-05-06 PROCEDURE — 76498P: CUSTOM

## 2024-05-17 ENCOUNTER — APPOINTMENT (OUTPATIENT)
Dept: UROLOGY | Facility: CLINIC | Age: 74
End: 2024-05-17
Payer: MEDICARE

## 2024-05-17 ENCOUNTER — INPATIENT (INPATIENT)
Facility: HOSPITAL | Age: 74
LOS: 8 days | Discharge: HOME | End: 2024-05-26
Attending: HOSPITALIST | Admitting: HOSPITALIST
Payer: MEDICARE

## 2024-05-17 VITALS
TEMPERATURE: 98 F | SYSTOLIC BLOOD PRESSURE: 150 MMHG | DIASTOLIC BLOOD PRESSURE: 96 MMHG | HEART RATE: 89 BPM | OXYGEN SATURATION: 99 %

## 2024-05-17 VITALS
HEIGHT: 68 IN | TEMPERATURE: 103 F | WEIGHT: 199.96 LBS | RESPIRATION RATE: 18 BRPM | OXYGEN SATURATION: 100 % | DIASTOLIC BLOOD PRESSURE: 69 MMHG | HEART RATE: 67 BPM | SYSTOLIC BLOOD PRESSURE: 106 MMHG

## 2024-05-17 DIAGNOSIS — N40.0 BENIGN PROSTATIC HYPERPLASIA WITHOUT LOWER URINARY TRACT SYMPMS: ICD-10-CM

## 2024-05-17 DIAGNOSIS — Z98.890 OTHER SPECIFIED POSTPROCEDURAL STATES: Chronic | ICD-10-CM

## 2024-05-17 LAB
ALBUMIN SERPL ELPH-MCNC: 2.8 G/DL — LOW (ref 3.3–5)
ALP SERPL-CCNC: 57 U/L — SIGNIFICANT CHANGE UP (ref 40–120)
ALT FLD-CCNC: 23 U/L — SIGNIFICANT CHANGE UP (ref 12–78)
ANION GAP SERPL CALC-SCNC: 8 MMOL/L — SIGNIFICANT CHANGE UP (ref 5–17)
APPEARANCE UR: ABNORMAL
APTT BLD: 28.6 SEC — SIGNIFICANT CHANGE UP (ref 24.5–35.6)
AST SERPL-CCNC: 26 U/L — SIGNIFICANT CHANGE UP (ref 15–37)
BACTERIA # UR AUTO: ABNORMAL /HPF
BASOPHILS # BLD AUTO: 0.02 K/UL — SIGNIFICANT CHANGE UP (ref 0–0.2)
BASOPHILS NFR BLD AUTO: 0.2 % — SIGNIFICANT CHANGE UP (ref 0–2)
BILIRUB SERPL-MCNC: 0.7 MG/DL — SIGNIFICANT CHANGE UP (ref 0.2–1.2)
BILIRUB UR-MCNC: ABNORMAL
BUN SERPL-MCNC: 33 MG/DL — HIGH (ref 7–23)
CALCIUM SERPL-MCNC: 8.3 MG/DL — LOW (ref 8.5–10.1)
CHLORIDE SERPL-SCNC: 107 MMOL/L — SIGNIFICANT CHANGE UP (ref 96–108)
CO2 SERPL-SCNC: 21 MMOL/L — LOW (ref 22–31)
COLOR SPEC: ABNORMAL
CREAT SERPL-MCNC: 2.48 MG/DL — HIGH (ref 0.5–1.3)
DIFF PNL FLD: ABNORMAL
EGFR: 27 ML/MIN/1.73M2 — LOW
EOSINOPHIL # BLD AUTO: 0.03 K/UL — SIGNIFICANT CHANGE UP (ref 0–0.5)
EOSINOPHIL NFR BLD AUTO: 0.3 % — SIGNIFICANT CHANGE UP (ref 0–6)
EPI CELLS # UR: PRESENT
GLUCOSE BLDC GLUCOMTR-MCNC: 131 MG/DL — HIGH (ref 70–99)
GLUCOSE BLDC GLUCOMTR-MCNC: 160 MG/DL — HIGH (ref 70–99)
GLUCOSE SERPL-MCNC: 170 MG/DL — HIGH (ref 70–99)
GLUCOSE UR QL: NEGATIVE MG/DL — SIGNIFICANT CHANGE UP
HCT VFR BLD CALC: 38.6 % — LOW (ref 39–50)
HGB BLD-MCNC: 13.3 G/DL — SIGNIFICANT CHANGE UP (ref 13–17)
IMM GRANULOCYTES NFR BLD AUTO: 0.4 % — SIGNIFICANT CHANGE UP (ref 0–0.9)
INR BLD: 0.97 RATIO — SIGNIFICANT CHANGE UP (ref 0.85–1.18)
KETONES UR-MCNC: NEGATIVE MG/DL — SIGNIFICANT CHANGE UP
LACTATE SERPL-SCNC: 2.1 MMOL/L — HIGH (ref 0.7–2)
LACTATE SERPL-SCNC: 2.6 MMOL/L — HIGH (ref 0.7–2)
LEUKOCYTE ESTERASE UR-ACNC: ABNORMAL
LYMPHOCYTES # BLD AUTO: 0.67 K/UL — LOW (ref 1–3.3)
LYMPHOCYTES # BLD AUTO: 5.9 % — LOW (ref 13–44)
MCHC RBC-ENTMCNC: 28.7 PG — SIGNIFICANT CHANGE UP (ref 27–34)
MCHC RBC-ENTMCNC: 34.5 G/DL — SIGNIFICANT CHANGE UP (ref 32–36)
MCV RBC AUTO: 83.4 FL — SIGNIFICANT CHANGE UP (ref 80–100)
MONOCYTES # BLD AUTO: 0.3 K/UL — SIGNIFICANT CHANGE UP (ref 0–0.9)
MONOCYTES NFR BLD AUTO: 2.6 % — SIGNIFICANT CHANGE UP (ref 2–14)
NEUTROPHILS # BLD AUTO: 10.34 K/UL — HIGH (ref 1.8–7.4)
NEUTROPHILS NFR BLD AUTO: 90.6 % — HIGH (ref 43–77)
NITRITE UR-MCNC: NEGATIVE — SIGNIFICANT CHANGE UP
NRBC # BLD: 0 /100 WBCS — SIGNIFICANT CHANGE UP (ref 0–0)
PH UR: 5.5 — SIGNIFICANT CHANGE UP (ref 5–8)
PLATELET # BLD AUTO: 225 K/UL — SIGNIFICANT CHANGE UP (ref 150–400)
POTASSIUM SERPL-MCNC: 4.9 MMOL/L — SIGNIFICANT CHANGE UP (ref 3.5–5.3)
POTASSIUM SERPL-SCNC: 4.9 MMOL/L — SIGNIFICANT CHANGE UP (ref 3.5–5.3)
PROT SERPL-MCNC: 7.1 GM/DL — SIGNIFICANT CHANGE UP (ref 6–8.3)
PROT UR-MCNC: >=1000 MG/DL
PROTHROM AB SERPL-ACNC: 11.7 SEC — SIGNIFICANT CHANGE UP (ref 9.5–13)
RBC # BLD: 4.63 M/UL — SIGNIFICANT CHANGE UP (ref 4.2–5.8)
RBC # FLD: 14.3 % — SIGNIFICANT CHANGE UP (ref 10.3–14.5)
RBC CASTS # UR COMP ASSIST: ABNORMAL /HPF
SODIUM SERPL-SCNC: 136 MMOL/L — SIGNIFICANT CHANGE UP (ref 135–145)
SP GR SPEC: 1.02 — SIGNIFICANT CHANGE UP (ref 1–1.03)
UROBILINOGEN FLD QL: 1 MG/DL — SIGNIFICANT CHANGE UP (ref 0.2–1)
WBC # BLD: 11.41 K/UL — HIGH (ref 3.8–10.5)
WBC # FLD AUTO: 11.41 K/UL — HIGH (ref 3.8–10.5)
WBC UR QL: ABNORMAL /HPF (ref 0–5)

## 2024-05-17 PROCEDURE — 52000 CYSTOURETHROSCOPY: CPT | Mod: 58

## 2024-05-17 PROCEDURE — 99213 OFFICE O/P EST LOW 20 MIN: CPT | Mod: 57

## 2024-05-17 PROCEDURE — 71045 X-RAY EXAM CHEST 1 VIEW: CPT | Mod: 26

## 2024-05-17 PROCEDURE — 99222 1ST HOSP IP/OBS MODERATE 55: CPT

## 2024-05-17 PROCEDURE — 99213 OFFICE O/P EST LOW 20 MIN: CPT | Mod: 25

## 2024-05-17 PROCEDURE — 99285 EMERGENCY DEPT VISIT HI MDM: CPT

## 2024-05-17 RX ORDER — DEXTROSE 50 % IN WATER 50 %
25 SYRINGE (ML) INTRAVENOUS ONCE
Refills: 0 | Status: DISCONTINUED | OUTPATIENT
Start: 2024-05-17 | End: 2024-05-26

## 2024-05-17 RX ORDER — MEROPENEM 1 G/30ML
1000 INJECTION INTRAVENOUS EVERY 12 HOURS
Refills: 0 | Status: COMPLETED | OUTPATIENT
Start: 2024-05-17 | End: 2024-05-20

## 2024-05-17 RX ORDER — SENNA PLUS 8.6 MG/1
2 TABLET ORAL AT BEDTIME
Refills: 0 | Status: DISCONTINUED | OUTPATIENT
Start: 2024-05-17 | End: 2024-05-26

## 2024-05-17 RX ORDER — DEXTROSE 50 % IN WATER 50 %
15 SYRINGE (ML) INTRAVENOUS ONCE
Refills: 0 | Status: DISCONTINUED | OUTPATIENT
Start: 2024-05-17 | End: 2024-05-26

## 2024-05-17 RX ORDER — GLUCAGON INJECTION, SOLUTION 0.5 MG/.1ML
1 INJECTION, SOLUTION SUBCUTANEOUS ONCE
Refills: 0 | Status: DISCONTINUED | OUTPATIENT
Start: 2024-05-17 | End: 2024-05-26

## 2024-05-17 RX ORDER — SODIUM CHLORIDE 9 MG/ML
1000 INJECTION INTRAMUSCULAR; INTRAVENOUS; SUBCUTANEOUS ONCE
Refills: 0 | Status: COMPLETED | OUTPATIENT
Start: 2024-05-17 | End: 2024-05-17

## 2024-05-17 RX ORDER — HEPARIN SODIUM 5000 [USP'U]/ML
5000 INJECTION INTRAVENOUS; SUBCUTANEOUS EVERY 8 HOURS
Refills: 0 | Status: DISCONTINUED | OUTPATIENT
Start: 2024-05-17 | End: 2024-05-26

## 2024-05-17 RX ORDER — AMLODIPINE BESYLATE 2.5 MG/1
5 TABLET ORAL DAILY
Refills: 0 | Status: DISCONTINUED | OUTPATIENT
Start: 2024-05-17 | End: 2024-05-26

## 2024-05-17 RX ORDER — ACETAMINOPHEN 500 MG
650 TABLET ORAL EVERY 6 HOURS
Refills: 0 | Status: DISCONTINUED | OUTPATIENT
Start: 2024-05-17 | End: 2024-05-26

## 2024-05-17 RX ORDER — ONDANSETRON 8 MG/1
4 TABLET, FILM COATED ORAL EVERY 8 HOURS
Refills: 0 | Status: DISCONTINUED | OUTPATIENT
Start: 2024-05-17 | End: 2024-05-26

## 2024-05-17 RX ORDER — LANOLIN ALCOHOL/MO/W.PET/CERES
3 CREAM (GRAM) TOPICAL AT BEDTIME
Refills: 0 | Status: DISCONTINUED | OUTPATIENT
Start: 2024-05-17 | End: 2024-05-26

## 2024-05-17 RX ORDER — LABETALOL HCL 100 MG
100 TABLET ORAL THREE TIMES A DAY
Refills: 0 | Status: DISCONTINUED | OUTPATIENT
Start: 2024-05-17 | End: 2024-05-26

## 2024-05-17 RX ORDER — SODIUM CHLORIDE 9 MG/ML
1000 INJECTION, SOLUTION INTRAVENOUS
Refills: 0 | Status: DISCONTINUED | OUTPATIENT
Start: 2024-05-17 | End: 2024-05-26

## 2024-05-17 RX ORDER — INSULIN LISPRO 100/ML
VIAL (ML) SUBCUTANEOUS
Refills: 0 | Status: DISCONTINUED | OUTPATIENT
Start: 2024-05-17 | End: 2024-05-26

## 2024-05-17 RX ORDER — DEXTROSE 10 % IN WATER 10 %
125 INTRAVENOUS SOLUTION INTRAVENOUS ONCE
Refills: 0 | Status: DISCONTINUED | OUTPATIENT
Start: 2024-05-17 | End: 2024-05-26

## 2024-05-17 RX ORDER — SODIUM CHLORIDE 9 MG/ML
1000 INJECTION INTRAMUSCULAR; INTRAVENOUS; SUBCUTANEOUS
Refills: 0 | Status: DISCONTINUED | OUTPATIENT
Start: 2024-05-17 | End: 2024-05-26

## 2024-05-17 RX ORDER — MEROPENEM 1 G/30ML
1000 INJECTION INTRAVENOUS ONCE
Refills: 0 | Status: COMPLETED | OUTPATIENT
Start: 2024-05-17 | End: 2024-05-17

## 2024-05-17 RX ADMIN — Medication 100 MILLIGRAM(S): at 22:12

## 2024-05-17 RX ADMIN — Medication 0: at 16:17

## 2024-05-17 RX ADMIN — AMLODIPINE BESYLATE 5 MILLIGRAM(S): 2.5 TABLET ORAL at 16:14

## 2024-05-17 RX ADMIN — SODIUM CHLORIDE 75 MILLILITER(S): 9 INJECTION INTRAMUSCULAR; INTRAVENOUS; SUBCUTANEOUS at 17:35

## 2024-05-17 RX ADMIN — MEROPENEM 100 MILLIGRAM(S): 1 INJECTION INTRAVENOUS at 17:35

## 2024-05-17 RX ADMIN — Medication 650 MILLIGRAM(S): at 16:13

## 2024-05-17 RX ADMIN — MEROPENEM 100 MILLIGRAM(S): 1 INJECTION INTRAVENOUS at 13:29

## 2024-05-17 RX ADMIN — SODIUM CHLORIDE 1000 MILLILITER(S): 9 INJECTION INTRAMUSCULAR; INTRAVENOUS; SUBCUTANEOUS at 13:20

## 2024-05-17 RX ADMIN — SODIUM CHLORIDE 1000 MILLILITER(S): 9 INJECTION INTRAMUSCULAR; INTRAVENOUS; SUBCUTANEOUS at 14:10

## 2024-05-17 RX ADMIN — HEPARIN SODIUM 5000 UNIT(S): 5000 INJECTION INTRAVENOUS; SUBCUTANEOUS at 22:07

## 2024-05-17 NOTE — CONSULT NOTE ADULT - NS PANP OPT1 GEN_ALL_CORE
I attest my time as PA/NP is greater than 50% of the total combined time spent on qualifying patient care activities by the PA/NP and attending. I have reviewed and confirmed nurses' notes for patient's medications, allergies, medical history, and surgical history.

## 2024-05-17 NOTE — CONSULT NOTE ADULT - ASSESSMENT
74 y/o male with BPH S/P Outpatient Cystoscopy with Dr Patterson today presented to ER with c/o Fever and chills  h/o left staghorn kidney stone, involving the lower calices, treated with PCNL on 3/6/24.  last psa 11.5                       72 y/o male with BPH S/P Outpatient Cystoscopy with Dr Patterson today presented to ER with c/o Fever and chills  h/o left staghorn kidney stone, involving the lower calices, treated with PCNL on 3/6/24.  last psa 11.5    Continue antibiotics  insert 3 way Carbajal with [facility]  Will plan for possible TURP this admission  Discussed with Dr Patterson 74 y/o male with BPH S/P Outpatient Cystoscopy with Dr Patterson today presented to ER with c/o Hematuria, Fever and chills  h/o left staghorn kidney stone, involving the lower calices, treated with PCNL on 3/6/24.  last psa 11.5    Continue antibiotics  insert 3 way Carbajal with [facility]  Will plan for possible TURP this admission  Discussed with Dr Patterson

## 2024-05-17 NOTE — ED ADULT TRIAGE NOTE - CHIEF COMPLAINT QUOTE
Patient BIB EMS from home to ED for urinary symptoms, as per EMS daughter reported father trembling and body was warm.

## 2024-05-17 NOTE — ED ADULT NURSE NOTE - OBJECTIVE STATEMENT
Pt biba from home with c/o fever and chills, after visiting urologist x today. daughter stated pt was seen for an inflammation in prostate,  pt denies urinary retention

## 2024-05-17 NOTE — H&P ADULT - NSHPLABSRESULTS_GEN_ALL_CORE
13.3   11.41 )-----------( 225      ( 17 May 2024 13:15 )             38.6         136  |  107  |  33<H>  ----------------------------<  170<H>  4.9   |  21<L>  |  2.48<H>    Ca    8.3<L>      17 May 2024 13:15    TPro  7.1  /  Alb  2.8<L>  /  TBili  0.7  /  DBili  x   /  AST  26  /  ALT  23  /  AlkPhos  57      PT/INR - ( 17 May 2024 13:15 )   PT: 11.7 sec;   INR: 0.97 ratio         PTT - ( 17 May 2024 13:15 )  PTT:28.6 sec  Urinalysis Basic - ( 17 May 2024 14:04 )    Color: Red / Appearance: Turbid / S.017 / pH: x  Gluc: x / Ketone: Negative mg/dL  / Bili: Small / Urobili: 1.0 mg/dL   Blood: x / Protein: >=1000 mg/dL / Nitrite: Negative   Leuk Esterase: Large / RBC: Too Numerous to count /HPF / WBC Too Numerous to count /HPF   Sq Epi: x / Non Sq Epi: x / Bacteria: Few /HPF

## 2024-05-17 NOTE — H&P ADULT - HISTORY OF PRESENT ILLNESS
73 yr old male who speaks Micronesian Creole  hx of Benign Hypertension, Diabetes Mellitus Type2 , recent perc nephrolithotomy s/p L PCN (removed), hx of esbl, staghorn calculus.   This morning he had cystoscopy done outpatient with Dr. Patterson.  Was doing well initially but after returning home began having fever, chills and feeling unwell. Also with dark colored urine.  Denies complaints otherwise.  Felt well prior to this.

## 2024-05-17 NOTE — CONSULT NOTE ADULT - SUBJECTIVE AND OBJECTIVE BOX
Chief Complaint:  Patient is a 73y old  Male who presents with a chief complaint of urosepsis (17 May 2024 15:38)      HPI:  73 yr old male who speaks Indian Creole  hx of Benign Hypertension, Diabetes Mellitus Type2 , recent perc nephrolithotomy s/p L PCN (removed), hx of esbl, staghorn calculus.   This morning he had cystoscopy done outpatient with Dr. Patterson.  Was doing well initially but after returning home began having fever, chills and feeling unwell. Also with dark colored urine.  Denies complaints otherwise.  Felt well prior to this. (17 May 2024 15:38)      PMH/PSH:PAST MEDICAL & SURGICAL HISTORY:  HTN (hypertension)  Arthrosis  Renal stone  Type 2 diabetes mellitus  Language barrier to communication  Beninese Creole  needed  Poor historian  Poor compliance with medication  S/P left knee surgery    Allergies:  No Known Allergies      Medications:  acetaminophen     Tablet .. 650 milliGRAM(s) Oral every 6 hours PRN  aluminum hydroxide/magnesium hydroxide/simethicone Suspension 30 milliLiter(s) Oral every 4 hours PRN  amLODIPine   Tablet 5 milliGRAM(s) Oral daily  dextrose 10% Bolus 125 milliLiter(s) IV Bolus once  dextrose 5%. 1000 milliLiter(s) IV Continuous <Continuous>  dextrose 50% Injectable 25 Gram(s) IV Push once  dextrose Oral Gel 15 Gram(s) Oral once PRN  glucagon  Injectable 1 milliGRAM(s) IntraMuscular once  heparin   Injectable 5000 Unit(s) SubCutaneous every 8 hours  insulin lispro (ADMELOG) corrective regimen sliding scale   SubCutaneous three times a day before meals  labetalol 100 milliGRAM(s) Oral three times a day  melatonin 3 milliGRAM(s) Oral at bedtime PRN  meropenem  IVPB 1000 milliGRAM(s) IV Intermittent every 12 hours  ondansetron Injectable 4 milliGRAM(s) IV Push every 8 hours PRN  senna 2 Tablet(s) Oral at bedtime PRN  sodium chloride 0.9%. 1000 milliLiter(s) IV Continuous <Continuous>      Review of Systems:    Negative exept for HPI    Relevant Family History:   FAMILY HISTORY:      Relevant Social History: Alcohol ( -) , Tobacco ( -) , Illicit drugs (- )     Physical Exam:    Vital Signs:  Vital Signs Last 24 Hrs  T(C): 36.9 (17 May 2024 16:51), Max: 39.5 (17 May 2024 12:42)  T(F): 98.4 (17 May 2024 16:51), Max: 103.1 (17 May 2024 12:42)  HR: 94 (17 May 2024 16:51) (67 - 109)  BP: 146/89 (17 May 2024 16:51) (96/65 - 161/85)  BP(mean): --  RR: 17 (17 May 2024 16:51) (16 - 26)  SpO2: 95% (17 May 2024 16:51) (95% - 100%)    Parameters below as of 17 May 2024 16:51  Patient On (Oxygen Delivery Method): room air      Daily Height in cm: 172.72 (17 May 2024 12:42)    Daily     General:  Appears stated age, well-groomed, well-nourished, no distress  HEENT:  NC/AT,  conjunctivae clear and pink, no thyromegaly, nodules, adenopathy, no JVD, anicteric sclera  Chest:  Full & symmetric excursion, no increased effort, breath sounds clear  Cardiovascular:  Regular rhythm, S1, S2, no murmur/rub/S3/S4, no abdominal bruit, no edema  Abdomen:  Soft, non tender, non distended, normoactive bowel sounds,  no masses , no hepatosplenomegaly, no signs of chronic liver disease  Extremities:  no cyanosis, clubbing or edema  Skin:  No rash/erythema/ecchymoses/petechiae/wounds/abscess/warm/dry  Neuro/Psych:  Alert, oriented, no asterixis, no tremor, no encephalopathy  : No CVA or SP tenderness, adequate meatus, bladder nonpalpable    Laboratory:                          13.3   11.41 )-----------( 225      ( 17 May 2024 13:15 )             38.6     05-    136  |  107  |  33<H>  ----------------------------<  170<H>  4.9   |  21<L>  |  2.48<H>    Ca    8.3<L>      17 May 2024 13:15    TPro  7.1  /  Alb  2.8<L>  /  TBili  0.7  /  DBili  x   /  AST  26  /  ALT  23  /  AlkPhos  57  05-17    LIVER FUNCTIONS - ( 17 May 2024 13:15 )  Alb: 2.8 g/dL / Pro: 7.1 gm/dL / ALK PHOS: 57 U/L / ALT: 23 U/L / AST: 26 U/L / GGT: x           PT/INR - ( 17 May 2024 13:15 )   PT: 11.7 sec;   INR: 0.97 ratio         PTT - ( 17 May 2024 13:15 )  PTT:28.6 sec  Urinalysis Basic - ( 17 May 2024 14:04 )    Color: Red / Appearance: Turbid / S.017 / pH: x  Gluc: x / Ketone: Negative mg/dL  / Bili: Small / Urobili: 1.0 mg/dL   Blood: x / Protein: >=1000 mg/dL / Nitrite: Negative   Leuk Esterase: Large / RBC: Too Numerous to count /HPF / WBC Too Numerous to count /HPF   Sq Epi: x / Non Sq Epi: x / Bacteria: Few /HPF          Intake and Output      Imaging:

## 2024-05-17 NOTE — ED PROVIDER NOTE - NSICDXPASTMEDICALHX_GEN_ALL_CORE_FT
PAST MEDICAL HISTORY:  Arthrosis     Panamanian Creole  needed     HTN (hypertension)     Language barrier to communication     Poor compliance with medication     Poor historian     Renal stone     Type 2 diabetes mellitus

## 2024-05-17 NOTE — ED PROVIDER NOTE - CLINICAL SUMMARY MEDICAL DECISION MAKING FREE TEXT BOX
72yo male with pmh htn presenting with fever, chills.  This morning he had cystoscopy done outpatient with Dr. Patterson.  Was doing well initially but after returning home began having fever, chills and feeling unwell.  Denies complaints otherwise.  Felt well prior to this.  No issues with urination, back pain, abdominal pain.  No cough, congestion, sore throat, sob, cp.  Took tylenol around 11am.  Arrives here with fever.  Concern for sepsis, uti.  Plan labs, cultures, ivf, abx, likely admission with hx esbl.

## 2024-05-17 NOTE — H&P ADULT - ASSESSMENT
73 yr old male who speaks Georgian Creole  hx of Benign Hypertension, Diabetes Mellitus Type2 , recent perc nephrolithotomy s/p L PCN (removed), hx of esbl, staghorn calculus.   This morning he had cystoscopy done outpatient with Dr. Patterson.  Was doing well initially but after returning home began having fever, chills and feeling unwell. Also with dark colored urine.  Denies complaints otherwise.  Felt well prior to this.    Urosepsis s/p cystoscopy  -c/w meropenem  -f/u cultures  -urology consult    ED  -trial of ivf     HTN  -home meds    DM  -iss

## 2024-05-17 NOTE — H&P ADULT - NSHPPHYSICALEXAM_GEN_ALL_CORE
Vital Signs Last 24 Hrs  T(C): 37.8 (17 May 2024 14:33), Max: 39.5 (17 May 2024 12:42)  T(F): 100.1 (17 May 2024 14:33), Max: 103.1 (17 May 2024 12:42)  HR: 101 (17 May 2024 14:33) (67 - 109)  BP: 159/99 (17 May 2024 14:33) (96/65 - 161/85)  BP(mean): --  RR: 20 (17 May 2024 14:33) (18 - 26)  SpO2: 97% (17 May 2024 14:33) (95% - 100%)    Parameters below as of 17 May 2024 14:33  Patient On (Oxygen Delivery Method): room air    GENERAL: NAD, well-groomed, well-developed  HEAD:  Atraumatic, Normocephalic  EYES: EOMI, PERRLA, conjunctiva and sclera clear  ENMT: No tonsillar erythema, exudates, or enlargement; Moist mucous membranes, Good dentition, No lesions  NECK: Supple, No JVD, Normal thyroid  NERVOUS SYSTEM:  Alert & Oriented X3, Good concentration; Motor Strength 5/5 B/L upper and lower extremities; DTRs 2+ intact and symmetric  CHEST/LUNG: Clear to percussion bilaterally; No rales, rhonchi, wheezing, or rubs  HEART: Regular rate and rhythm; No murmurs, rubs, or gallops  ABDOMEN: Soft, Nontender, Nondistended; Bowel sounds present  EXTREMITIES:  2+ Peripheral Pulses, No clubbing, cyanosis, or edema  LYMPH: No lymphadenopathy   SKIN: No rashes or lesions

## 2024-05-17 NOTE — H&P ADULT - NSHPREVIEWOFSYSTEMS_GEN_ALL_CORE
CONSTITUTIONAL: No fever, weight loss, or fatigue  EYES: No eye pain, visual disturbances, or discharge  ENMT:  No difficulty hearing, tinnitus, vertigo; No sinus or throat pain  NECK: No pain or stiffness  BREASTS: No pain, masses, or nipple discharge  RESPIRATORY: No cough, wheezing, chills or hemoptysis; No shortness of breath  CARDIOVASCULAR: No chest pain, palpitations, dizziness, or leg swelling  GASTROINTESTINAL: No abdominal or epigastric pain. No nausea, vomiting, or hematemesis; No diarrhea or constipation. No melena or hematochezia.  GENITOURINARY: see hpi  NEUROLOGICAL: No headaches, memory loss, loss of strength, numbness, or tremors  SKIN: No itching, burning, rashes, or lesions   LYMPH NODES: No enlarged glands  ENDOCRINE: No heat or cold intolerance; No hair loss  MUSCULOSKELETAL: No joint pain or swelling; No muscle, back, or extremity pain  PSYCHIATRIC: No depression, anxiety, mood swings, or difficulty sleeping  HEME/LYMPH: No easy bruising, or bleeding gums  ALLERGY AND IMMUNOLOGIC: No hives or eczema

## 2024-05-17 NOTE — ED PROVIDER NOTE - PHYSICAL EXAMINATION
General appearance: Nontoxic appearing, conversant, febrile    Eyes: anicteric sclerae, ELISA, EOMI   HENT: Atraumatic; oropharynx clear, MMM and no ulcerations, no pharyngeal erythema or exudate   Neck: Trachea midline; Full range of motion, supple   Pulm: CTA bl, normal respiratory effort and no intercostal retractions, normal work of breathing   CV: RRR, No murmurs, rubs, or gallops   Abdomen: Soft, non-tender, non-distended; no guarding or rebound   Extremities: No peripheral edema, no gross deformities, FROM x4   Skin: Dry, normal temperature, turgor and texture; no rash   Psych: Appropriate affect, cooperative

## 2024-05-17 NOTE — ED ADULT NURSE NOTE - NSICDXPASTMEDICALHX_GEN_ALL_CORE_FT
PAST MEDICAL HISTORY:  Arthrosis     Indonesian Creole  needed     HTN (hypertension)     Language barrier to communication     Poor compliance with medication     Poor historian     Renal stone     Type 2 diabetes mellitus

## 2024-05-17 NOTE — H&P ADULT - NSICDXPASTMEDICALHX_GEN_ALL_CORE_FT
PAST MEDICAL HISTORY:  Arthrosis     Samoan Creole  needed     HTN (hypertension)     Language barrier to communication     Poor compliance with medication     Poor historian     Renal stone     Type 2 diabetes mellitus

## 2024-05-17 NOTE — PATIENT PROFILE ADULT - FALL HARM RISK - HARM RISK INTERVENTIONS

## 2024-05-18 LAB
-  CTX-M RESISTANCE MARKER: SIGNIFICANT CHANGE UP
-  ESBL: SIGNIFICANT CHANGE UP
A1C WITH ESTIMATED AVERAGE GLUCOSE RESULT: 6.7 % — HIGH (ref 4–5.6)
ALBUMIN SERPL ELPH-MCNC: 2.6 G/DL — LOW (ref 3.3–5)
ALP SERPL-CCNC: 50 U/L — SIGNIFICANT CHANGE UP (ref 40–120)
ALT FLD-CCNC: 39 U/L — SIGNIFICANT CHANGE UP (ref 12–78)
ANION GAP SERPL CALC-SCNC: 6 MMOL/L — SIGNIFICANT CHANGE UP (ref 5–17)
AST SERPL-CCNC: 98 U/L — HIGH (ref 15–37)
BILIRUB SERPL-MCNC: 0.4 MG/DL — SIGNIFICANT CHANGE UP (ref 0.2–1.2)
BUN SERPL-MCNC: 30 MG/DL — HIGH (ref 7–23)
CALCIUM SERPL-MCNC: 8.2 MG/DL — LOW (ref 8.5–10.1)
CHLORIDE SERPL-SCNC: 110 MMOL/L — HIGH (ref 96–108)
CO2 SERPL-SCNC: 21 MMOL/L — LOW (ref 22–31)
CREAT SERPL-MCNC: 2.18 MG/DL — HIGH (ref 0.5–1.3)
E COLI DNA BLD POS QL NAA+NON-PROBE: SIGNIFICANT CHANGE UP
EGFR: 31 ML/MIN/1.73M2 — LOW
ESTIMATED AVERAGE GLUCOSE: 146 MG/DL — HIGH (ref 68–114)
GLUCOSE BLDC GLUCOMTR-MCNC: 120 MG/DL — HIGH (ref 70–99)
GLUCOSE BLDC GLUCOMTR-MCNC: 120 MG/DL — HIGH (ref 70–99)
GLUCOSE BLDC GLUCOMTR-MCNC: 135 MG/DL — HIGH (ref 70–99)
GLUCOSE BLDC GLUCOMTR-MCNC: 98 MG/DL — SIGNIFICANT CHANGE UP (ref 70–99)
GLUCOSE BLDC GLUCOMTR-MCNC: 99 MG/DL — SIGNIFICANT CHANGE UP (ref 70–99)
GLUCOSE SERPL-MCNC: 114 MG/DL — HIGH (ref 70–99)
GRAM STN FLD: ABNORMAL
GRAM STN FLD: ABNORMAL
HCT VFR BLD CALC: 37.6 % — LOW (ref 39–50)
HGB BLD-MCNC: 12.9 G/DL — LOW (ref 13–17)
MCHC RBC-ENTMCNC: 28.4 PG — SIGNIFICANT CHANGE UP (ref 27–34)
MCHC RBC-ENTMCNC: 34.3 G/DL — SIGNIFICANT CHANGE UP (ref 32–36)
MCV RBC AUTO: 82.8 FL — SIGNIFICANT CHANGE UP (ref 80–100)
METHOD TYPE: SIGNIFICANT CHANGE UP
NRBC # BLD: 0 /100 WBCS — SIGNIFICANT CHANGE UP (ref 0–0)
PLATELET # BLD AUTO: 247 K/UL — SIGNIFICANT CHANGE UP (ref 150–400)
POTASSIUM SERPL-MCNC: 4.3 MMOL/L — SIGNIFICANT CHANGE UP (ref 3.5–5.3)
POTASSIUM SERPL-SCNC: 4.3 MMOL/L — SIGNIFICANT CHANGE UP (ref 3.5–5.3)
PROT SERPL-MCNC: 6.7 GM/DL — SIGNIFICANT CHANGE UP (ref 6–8.3)
RBC # BLD: 4.54 M/UL — SIGNIFICANT CHANGE UP (ref 4.2–5.8)
RBC # FLD: 14.5 % — SIGNIFICANT CHANGE UP (ref 10.3–14.5)
SODIUM SERPL-SCNC: 137 MMOL/L — SIGNIFICANT CHANGE UP (ref 135–145)
SPECIMEN SOURCE: SIGNIFICANT CHANGE UP
SPECIMEN SOURCE: SIGNIFICANT CHANGE UP
WBC # BLD: 17.88 K/UL — HIGH (ref 3.8–10.5)
WBC # FLD AUTO: 17.88 K/UL — HIGH (ref 3.8–10.5)

## 2024-05-18 PROCEDURE — 99231 SBSQ HOSP IP/OBS SF/LOW 25: CPT

## 2024-05-18 PROCEDURE — 99232 SBSQ HOSP IP/OBS MODERATE 35: CPT

## 2024-05-18 RX ORDER — TAMSULOSIN HYDROCHLORIDE 0.4 MG/1
0.8 CAPSULE ORAL AT BEDTIME
Refills: 0 | Status: DISCONTINUED | OUTPATIENT
Start: 2024-05-18 | End: 2024-05-26

## 2024-05-18 RX ADMIN — MEROPENEM 100 MILLIGRAM(S): 1 INJECTION INTRAVENOUS at 05:12

## 2024-05-18 RX ADMIN — Medication 100 MILLIGRAM(S): at 22:25

## 2024-05-18 RX ADMIN — HEPARIN SODIUM 5000 UNIT(S): 5000 INJECTION INTRAVENOUS; SUBCUTANEOUS at 14:01

## 2024-05-18 RX ADMIN — AMLODIPINE BESYLATE 5 MILLIGRAM(S): 2.5 TABLET ORAL at 05:13

## 2024-05-18 RX ADMIN — SODIUM CHLORIDE 75 MILLILITER(S): 9 INJECTION INTRAMUSCULAR; INTRAVENOUS; SUBCUTANEOUS at 05:13

## 2024-05-18 RX ADMIN — HEPARIN SODIUM 5000 UNIT(S): 5000 INJECTION INTRAVENOUS; SUBCUTANEOUS at 05:13

## 2024-05-18 RX ADMIN — HEPARIN SODIUM 5000 UNIT(S): 5000 INJECTION INTRAVENOUS; SUBCUTANEOUS at 22:25

## 2024-05-18 RX ADMIN — Medication 100 MILLIGRAM(S): at 09:28

## 2024-05-18 RX ADMIN — TAMSULOSIN HYDROCHLORIDE 0.8 MILLIGRAM(S): 0.4 CAPSULE ORAL at 22:24

## 2024-05-18 RX ADMIN — SODIUM CHLORIDE 75 MILLILITER(S): 9 INJECTION INTRAMUSCULAR; INTRAVENOUS; SUBCUTANEOUS at 22:27

## 2024-05-18 RX ADMIN — MEROPENEM 100 MILLIGRAM(S): 1 INJECTION INTRAVENOUS at 18:06

## 2024-05-18 RX ADMIN — Medication 100 MILLIGRAM(S): at 14:05

## 2024-05-18 NOTE — PROGRESS NOTE ADULT - SUBJECTIVE AND OBJECTIVE BOX
Patient seen and  examined at bedside resting comfortably.   Offers no complaints at this time.   Voiding well in cannister. Urine now clear yellow.   Afebrile overnight. Denies chills, N/V/D, CP, SOB.     Vital Signs Last 24 Hrs  T(F): 97.4 (05-18-24 @ 10:50), Max: 103.1 (05-17-24 @ 12:42)  HR: 75 (05-18-24 @ 10:50)  BP: 109/67 (05-18-24 @ 10:50)  RR: 18 (05-18-24 @ 10:50)  SpO2: 97% (05-18-24 @ 10:50)  POCT Blood Glucose.: 120 mg/dL (18 May 2024 10:59)    PHYSICAL EXAM:  GENERAL: Alert, NAD  CHEST/LUNG: Clear to auscultation bilaterally, respirations nonlabored  HEART: Regular rate and rhythm; S1 & S2 appreciated  ABDOMEN: + Bowel sounds, soft, Nontender, Nondistended  : no suprapubic tenderness or distention. Urine clear yellow in cannister   EXTREMITIES:  no calf tenderness, No edema    I&O's Detail    17 May 2024 07:01  -  18 May 2024 07:00  --------------------------------------------------------  IN:    IV PiggyBack: 50 mL    sodium chloride 0.9%: 950 mL  Total IN: 1000 mL    OUT:    Voided (mL): 900 mL  Total OUT: 900 mL    Total NET: 100 mL          LABS:                        12.9   17.88 )-----------( 247      ( 18 May 2024 06:20 )             37.6     05-18    137  |  110<H>  |  30<H>  ----------------------------<  114<H>  4.3   |  21<L>  |  2.18<H>    Ca    8.2<L>      18 May 2024 06:20    TPro  6.7  /  Alb  2.6<L>  /  TBili  0.4  /  DBili  x   /  AST  98<H>  /  ALT  39  /  AlkPhos  50  05-18    PT/INR - ( 17 May 2024 13:15 )   PT: 11.7 sec;   INR: 0.97 ratio         PTT - ( 17 May 2024 13:15 )  PTT:28.6 sec    RADIOLOGY & ADDITIONAL STUDIES:    A/P  74 y/o male with BPH S/P Outpatient Cystoscopy with Dr Patterson on 5/17, presented to ER with c/o Hematuria, Fever and chills (now resolved)  h/o left staghorn kidney stone, involving the lower calices, treated with PCNL on 3/6/24, now removed prior to OP cystoscopy.  Voiding well in cannister  Worsening leukocytosis. Bcx: ESBL e. coli    - continue abx, f/u urine cultures  - possible TURP this admission  - continue care per primary team   - will d/w urology attending  Patient seen and  examined at bedside resting comfortably.   Offers no complaints at this time.   Voiding well in cannister. Urine now clear yellow.   Afebrile overnight. Denies chills, N/V/D, CP, SOB.     Vital Signs Last 24 Hrs  T(F): 97.4 (05-18-24 @ 10:50), Max: 103.1 (05-17-24 @ 12:42)  HR: 75 (05-18-24 @ 10:50)  BP: 109/67 (05-18-24 @ 10:50)  RR: 18 (05-18-24 @ 10:50)  SpO2: 97% (05-18-24 @ 10:50)  POCT Blood Glucose.: 120 mg/dL (18 May 2024 10:59)    PHYSICAL EXAM:  GENERAL: Alert, NAD  CHEST/LUNG: Clear to auscultation bilaterally, respirations nonlabored  HEART: Regular rate and rhythm; S1 & S2 appreciated  ABDOMEN: + Bowel sounds, soft, Nontender, Nondistended  : no suprapubic tenderness or distention. Urine clear yellow in cannister   EXTREMITIES:  no calf tenderness, No edema    I&O's Detail    17 May 2024 07:01  -  18 May 2024 07:00  --------------------------------------------------------  IN:    IV PiggyBack: 50 mL    sodium chloride 0.9%: 950 mL  Total IN: 1000 mL    OUT:    Voided (mL): 900 mL  Total OUT: 900 mL    Total NET: 100 mL          LABS:                        12.9   17.88 )-----------( 247      ( 18 May 2024 06:20 )             37.6     05-18    137  |  110<H>  |  30<H>  ----------------------------<  114<H>  4.3   |  21<L>  |  2.18<H>    Ca    8.2<L>      18 May 2024 06:20    TPro  6.7  /  Alb  2.6<L>  /  TBili  0.4  /  DBili  x   /  AST  98<H>  /  ALT  39  /  AlkPhos  50  05-18    PT/INR - ( 17 May 2024 13:15 )   PT: 11.7 sec;   INR: 0.97 ratio         PTT - ( 17 May 2024 13:15 )  PTT:28.6 sec    RADIOLOGY & ADDITIONAL STUDIES:    A/P  72 y/o male with BPH S/P Outpatient Cystoscopy with Dr Patterson on 5/17, presented to ER with c/o Hematuria, Fever and chills (now resolved)  h/o left staghorn kidney stone, involving the lower calices, treated with PCNL on 3/6/24, now removed prior to OP cystoscopy.  Voiding well in cannister  Worsening leukocytosis. Bcx: ESBL e. coli    - continue abx, f/u urine cultures  - f/u PVR  - possible TURP this admission  - continue care per primary team   - will d/w urology attending

## 2024-05-18 NOTE — PROGRESS NOTE ADULT - NS PANP OPT1 GEN_ALL_CORE
I attest my time as PA/NP is greater than 50% of the total combined time spent on qualifying patient care activities by the PA/NP and attending.
1.97

## 2024-05-18 NOTE — PROGRESS NOTE ADULT - SUBJECTIVE AND OBJECTIVE BOX
Patient is a 73y old  Male who presents with a chief complaint of urosepsis (17 May 2024 17:32)    INTERVAL HPI/OVERNIGHT EVENTS: no events     MEDICATIONS  (STANDING):  amLODIPine   Tablet 5 milliGRAM(s) Oral daily  dextrose 10% Bolus 125 milliLiter(s) IV Bolus once  dextrose 5%. 1000 milliLiter(s) (50 mL/Hr) IV Continuous <Continuous>  dextrose 50% Injectable 25 Gram(s) IV Push once  glucagon  Injectable 1 milliGRAM(s) IntraMuscular once  heparin   Injectable 5000 Unit(s) SubCutaneous every 8 hours  insulin lispro (ADMELOG) corrective regimen sliding scale   SubCutaneous three times a day before meals  labetalol 100 milliGRAM(s) Oral three times a day  meropenem  IVPB 1000 milliGRAM(s) IV Intermittent every 12 hours  sodium chloride 0.9%. 1000 milliLiter(s) (75 mL/Hr) IV Continuous <Continuous>  tamsulosin 0.8 milliGRAM(s) Oral at bedtime    MEDICATIONS  (PRN):  acetaminophen     Tablet .. 650 milliGRAM(s) Oral every 6 hours PRN Temp greater or equal to 38C (100.4F), Mild Pain (1 - 3)  aluminum hydroxide/magnesium hydroxide/simethicone Suspension 30 milliLiter(s) Oral every 4 hours PRN Dyspepsia  dextrose Oral Gel 15 Gram(s) Oral once PRN Blood Glucose LESS THAN 70 milliGRAM(s)/deciliter  melatonin 3 milliGRAM(s) Oral at bedtime PRN Insomnia  ondansetron Injectable 4 milliGRAM(s) IV Push every 8 hours PRN Nausea and/or Vomiting  senna 2 Tablet(s) Oral at bedtime PRN Constipation    Allergies    No Known Allergies    Intolerances      REVIEW OF SYSTEMS:  All other systems reviewed and are negative    Vital Signs Last 24 Hrs  T(C): 36.4 (18 May 2024 05:00), Max: 39.5 (17 May 2024 12:42)  T(F): 97.6 (18 May 2024 05:00), Max: 103.1 (17 May 2024 12:42)  HR: 71 (18 May 2024 05:00) (67 - 109)  BP: 115/70 (18 May 2024 05:00) (96/65 - 161/85)  BP(mean): --  RR: 19 (18 May 2024 05:00) (16 - 26)  SpO2: 98% (18 May 2024 05:00) (95% - 100%)    Parameters below as of 18 May 2024 05:00  Patient On (Oxygen Delivery Method): room air      Daily Height in cm: 172.72 (17 May 2024 12:42)    Daily   I&O's Summary    17 May 2024 07:01  -  18 May 2024 07:00  --------------------------------------------------------  IN: 1000 mL / OUT: 900 mL / NET: 100 mL      CAPILLARY BLOOD GLUCOSE      POCT Blood Glucose.: 98 mg/dL (18 May 2024 08:21)  POCT Blood Glucose.: 99 mg/dL (18 May 2024 07:41)  POCT Blood Glucose.: 160 mg/dL (17 May 2024 21:51)  POCT Blood Glucose.: 131 mg/dL (17 May 2024 16:13)    PHYSICAL EXAM:  GENERAL: NAD,    HEAD:  Atraumatic, Normocephalic  EYES: EOMI, PERRLA, conjunctiva and sclera clear  ENMT: No tonsillar erythema, exudates, or enlargement; Moist mucous membranes, Good dentition, No lesions  NECK: Supple, No JVD, Normal thyroid  NERVOUS SYSTEM:  Alert & Oriented X3, Good concentration; Motor Strength 5/5 B/L upper and lower extremities; DTRs 2+ intact and symmetric  CHEST/LUNG: Clear to percussion bilaterally; No rales, rhonchi, wheezing, or rubs  HEART: Regular rate and rhythm; No murmurs, rubs, or gallops  ABDOMEN: Soft, Nontender, Nondistended; Bowel sounds present  EXTREMITIES:  2+ Peripheral Pulses, No clubbing, cyanosis, or edema  LYMPH: No lymphadenopathy noted  SKIN: No rashes or lesions    Labs                          12.9   17.88 )-----------( 247      ( 18 May 2024 06:20 )             37.6     05-18    137  |  110<H>  |  30<H>  ----------------------------<  114<H>  4.3   |  21<L>  |  2.18<H>    Ca    8.2<L>      18 May 2024 06:20    TPro  6.7  /  Alb  2.6<L>  /  TBili  0.4  /  DBili  x   /  AST  98<H>  /  ALT  39  /  AlkPhos  50  05-18    PT/INR - ( 17 May 2024 13:15 )   PT: 11.7 sec;   INR: 0.97 ratio         PTT - ( 17 May 2024 13:15 )  PTT:28.6 sec      Urinalysis Basic - ( 18 May 2024 06:20 )    Color: x / Appearance: x / SG: x / pH: x  Gluc: 114 mg/dL / Ketone: x  / Bili: x / Urobili: x   Blood: x / Protein: x / Nitrite: x   Leuk Esterase: x / RBC: x / WBC x   Sq Epi: x / Non Sq Epi: x / Bacteria: x        Culture - Blood (collected 17 May 2024 13:10)  Source: .Blood Blood-Peripheral  Gram Stain (prelim) (18 May 2024 05:29):    Growth in anaerobic bottle: Gram Negative Rods  Preliminary Report (18 May 2024 05:29):    Growth in anaerobic bottle: Gram Negative Rods    Culture - Blood (collected 17 May 2024 13:00)  Source: .Blood Blood-Peripheral  Gram Stain (prelim) (18 May 2024 05:14):    Growth in anaerobic bottle: Gram Negative Rods  Preliminary Report (18 May 2024 05:14):    Growth in anaerobic bottle: Gram Negative Rods    Direct identification is available within approximately 3-5    hours either by Blood Panel Multiplexed PCR or Direct    MALDI-TOF. Details: [email or web address]  Organism: Blood Culture PCR (18 May 2024 06:44)  Organism: Blood Culture PCR (18 May 2024 06:44)                DVT prophylaxis: > Lovenox 40mg SQ daily  > Heparin   > SCD's Patient is a 73y old  Male who presents with a chief complaint of urosepsis (17 May 2024 17:32)    INTERVAL HPI/OVERNIGHT EVENTS: no events , voiding well this am, clear urine     MEDICATIONS  (STANDING):  amLODIPine   Tablet 5 milliGRAM(s) Oral daily  dextrose 10% Bolus 125 milliLiter(s) IV Bolus once  dextrose 5%. 1000 milliLiter(s) (50 mL/Hr) IV Continuous <Continuous>  dextrose 50% Injectable 25 Gram(s) IV Push once  glucagon  Injectable 1 milliGRAM(s) IntraMuscular once  heparin   Injectable 5000 Unit(s) SubCutaneous every 8 hours  insulin lispro (ADMELOG) corrective regimen sliding scale   SubCutaneous three times a day before meals  labetalol 100 milliGRAM(s) Oral three times a day  meropenem  IVPB 1000 milliGRAM(s) IV Intermittent every 12 hours  sodium chloride 0.9%. 1000 milliLiter(s) (75 mL/Hr) IV Continuous <Continuous>  tamsulosin 0.8 milliGRAM(s) Oral at bedtime    MEDICATIONS  (PRN):  acetaminophen     Tablet .. 650 milliGRAM(s) Oral every 6 hours PRN Temp greater or equal to 38C (100.4F), Mild Pain (1 - 3)  aluminum hydroxide/magnesium hydroxide/simethicone Suspension 30 milliLiter(s) Oral every 4 hours PRN Dyspepsia  dextrose Oral Gel 15 Gram(s) Oral once PRN Blood Glucose LESS THAN 70 milliGRAM(s)/deciliter  melatonin 3 milliGRAM(s) Oral at bedtime PRN Insomnia  ondansetron Injectable 4 milliGRAM(s) IV Push every 8 hours PRN Nausea and/or Vomiting  senna 2 Tablet(s) Oral at bedtime PRN Constipation    Allergies    No Known Allergies    Intolerances      REVIEW OF SYSTEMS:  All other systems reviewed and are negative    Vital Signs Last 24 Hrs  T(C): 36.4 (18 May 2024 05:00), Max: 39.5 (17 May 2024 12:42)  T(F): 97.6 (18 May 2024 05:00), Max: 103.1 (17 May 2024 12:42)  HR: 71 (18 May 2024 05:00) (67 - 109)  BP: 115/70 (18 May 2024 05:00) (96/65 - 161/85)  BP(mean): --  RR: 19 (18 May 2024 05:00) (16 - 26)  SpO2: 98% (18 May 2024 05:00) (95% - 100%)    Parameters below as of 18 May 2024 05:00  Patient On (Oxygen Delivery Method): room air      Daily Height in cm: 172.72 (17 May 2024 12:42)    Daily   I&O's Summary    17 May 2024 07:01  -  18 May 2024 07:00  --------------------------------------------------------  IN: 1000 mL / OUT: 900 mL / NET: 100 mL      CAPILLARY BLOOD GLUCOSE      POCT Blood Glucose.: 98 mg/dL (18 May 2024 08:21)  POCT Blood Glucose.: 99 mg/dL (18 May 2024 07:41)  POCT Blood Glucose.: 160 mg/dL (17 May 2024 21:51)  POCT Blood Glucose.: 131 mg/dL (17 May 2024 16:13)    PHYSICAL EXAM:  GENERAL: NAD,    HEAD:  Atraumatic, Normocephalic  EYES: EOMI, PERRLA, conjunctiva and sclera clear  ENMT: No tonsillar erythema, exudates, or enlargement; Moist mucous membranes, Good dentition, No lesions  NECK: Supple, No JVD, Normal thyroid  NERVOUS SYSTEM:  Alert & Oriented X3, Good concentration; Motor Strength 5/5 B/L upper and lower extremities; DTRs 2+ intact and symmetric  CHEST/LUNG: Clear to percussion bilaterally; No rales, rhonchi, wheezing, or rubs  HEART: Regular rate and rhythm; No murmurs, rubs, or gallops  ABDOMEN: Soft, Nontender, Nondistended; Bowel sounds present  EXTREMITIES:  2+ Peripheral Pulses, No clubbing, cyanosis, or edema  LYMPH: No lymphadenopathy noted  SKIN: No rashes or lesions    Labs                          12.9   17.88 )-----------( 247      ( 18 May 2024 06:20 )             37.6     05-18    137  |  110<H>  |  30<H>  ----------------------------<  114<H>  4.3   |  21<L>  |  2.18<H>    Ca    8.2<L>      18 May 2024 06:20    TPro  6.7  /  Alb  2.6<L>  /  TBili  0.4  /  DBili  x   /  AST  98<H>  /  ALT  39  /  AlkPhos  50  05-18    PT/INR - ( 17 May 2024 13:15 )   PT: 11.7 sec;   INR: 0.97 ratio         PTT - ( 17 May 2024 13:15 )  PTT:28.6 sec      Urinalysis Basic - ( 18 May 2024 06:20 )    Color: x / Appearance: x / SG: x / pH: x  Gluc: 114 mg/dL / Ketone: x  / Bili: x / Urobili: x   Blood: x / Protein: x / Nitrite: x   Leuk Esterase: x / RBC: x / WBC x   Sq Epi: x / Non Sq Epi: x / Bacteria: x        Culture - Blood (collected 17 May 2024 13:10)  Source: .Blood Blood-Peripheral  Gram Stain (prelim) (18 May 2024 05:29):    Growth in anaerobic bottle: Gram Negative Rods  Preliminary Report (18 May 2024 05:29):    Growth in anaerobic bottle: Gram Negative Rods    Culture - Blood (collected 17 May 2024 13:00)  Source: .Blood Blood-Peripheral  Gram Stain (prelim) (18 May 2024 05:14):    Growth in anaerobic bottle: Gram Negative Rods  Preliminary Report (18 May 2024 05:14):    Growth in anaerobic bottle: Gram Negative Rods    Direct identification is available within approximately 3-5    hours either by Blood Panel Multiplexed PCR or Direct    MALDI-TOF. Details: [email or web address]  Organism: Blood Culture PCR (18 May 2024 06:44)  Organism: Blood Culture PCR (18 May 2024 06:44)                DVT prophylaxis: > Lovenox 40mg SQ daily  > Heparin   > SCD's

## 2024-05-18 NOTE — PROGRESS NOTE ADULT - ASSESSMENT
73 yr old male who speaks [place]ese Creole  hx of Benign Hypertension, Diabetes Mellitus Type2 , recent perc nephrolithotomy s/p L PCN (removed), hx of esbl, staghorn calculus.   This morning he had cystoscopy done outpatient with Dr. Patterson.  Was doing well initially but after returning home began having fever, chills and feeling unwell. Also with dark colored urine.  Denies complaints otherwise.  Felt well prior to this.    Urosepsis s/p cystoscopy, gram neg bacteremia  -c/w meropenem  -f/u cultures, repeat set in am   -urology consult on board    ED  -trial of ivf     HTN  -home meds    DM  -iss

## 2024-05-19 LAB
-  AMPICILLIN/SULBACTAM: SIGNIFICANT CHANGE UP
-  AMPICILLIN: SIGNIFICANT CHANGE UP
-  AZTREONAM: SIGNIFICANT CHANGE UP
-  CEFAZOLIN: SIGNIFICANT CHANGE UP
-  CEFEPIME: SIGNIFICANT CHANGE UP
-  CEFTRIAXONE: SIGNIFICANT CHANGE UP
-  CIPROFLOXACIN: SIGNIFICANT CHANGE UP
-  ERTAPENEM: SIGNIFICANT CHANGE UP
-  GENTAMICIN: SIGNIFICANT CHANGE UP
-  IMIPENEM: SIGNIFICANT CHANGE UP
-  LEVOFLOXACIN: SIGNIFICANT CHANGE UP
-  MEROPENEM: SIGNIFICANT CHANGE UP
-  PIPERACILLIN/TAZOBACTAM: SIGNIFICANT CHANGE UP
-  TOBRAMYCIN: SIGNIFICANT CHANGE UP
-  TRIMETHOPRIM/SULFAMETHOXAZOLE: SIGNIFICANT CHANGE UP
ALBUMIN SERPL ELPH-MCNC: 2.5 G/DL — LOW (ref 3.3–5)
ALP SERPL-CCNC: 47 U/L — SIGNIFICANT CHANGE UP (ref 40–120)
ALT FLD-CCNC: 39 U/L — SIGNIFICANT CHANGE UP (ref 12–78)
ANION GAP SERPL CALC-SCNC: 8 MMOL/L — SIGNIFICANT CHANGE UP (ref 5–17)
AST SERPL-CCNC: 74 U/L — HIGH (ref 15–37)
BILIRUB SERPL-MCNC: 0.4 MG/DL — SIGNIFICANT CHANGE UP (ref 0.2–1.2)
BUN SERPL-MCNC: 27 MG/DL — HIGH (ref 7–23)
CALCIUM SERPL-MCNC: 8.3 MG/DL — LOW (ref 8.5–10.1)
CHLORIDE SERPL-SCNC: 108 MMOL/L — SIGNIFICANT CHANGE UP (ref 96–108)
CO2 SERPL-SCNC: 20 MMOL/L — LOW (ref 22–31)
CREAT SERPL-MCNC: 1.92 MG/DL — HIGH (ref 0.5–1.3)
CULTURE RESULTS: ABNORMAL
CULTURE RESULTS: ABNORMAL
EGFR: 36 ML/MIN/1.73M2 — LOW
GLUCOSE BLDC GLUCOMTR-MCNC: 107 MG/DL — HIGH (ref 70–99)
GLUCOSE BLDC GLUCOMTR-MCNC: 113 MG/DL — HIGH (ref 70–99)
GLUCOSE BLDC GLUCOMTR-MCNC: 131 MG/DL — HIGH (ref 70–99)
GLUCOSE BLDC GLUCOMTR-MCNC: 97 MG/DL — SIGNIFICANT CHANGE UP (ref 70–99)
GLUCOSE SERPL-MCNC: 94 MG/DL — SIGNIFICANT CHANGE UP (ref 70–99)
GRAM STN FLD: ABNORMAL
GRAM STN FLD: ABNORMAL
HCT VFR BLD CALC: 35.8 % — LOW (ref 39–50)
HGB BLD-MCNC: 12.4 G/DL — LOW (ref 13–17)
MCHC RBC-ENTMCNC: 28.6 PG — SIGNIFICANT CHANGE UP (ref 27–34)
MCHC RBC-ENTMCNC: 34.6 G/DL — SIGNIFICANT CHANGE UP (ref 32–36)
MCV RBC AUTO: 82.5 FL — SIGNIFICANT CHANGE UP (ref 80–100)
METHOD TYPE: SIGNIFICANT CHANGE UP
NRBC # BLD: 0 /100 WBCS — SIGNIFICANT CHANGE UP (ref 0–0)
ORGANISM # SPEC MICROSCOPIC CNT: ABNORMAL
ORGANISM # SPEC MICROSCOPIC CNT: ABNORMAL
ORGANISM # SPEC MICROSCOPIC CNT: SIGNIFICANT CHANGE UP
PLATELET # BLD AUTO: 257 K/UL — SIGNIFICANT CHANGE UP (ref 150–400)
POTASSIUM SERPL-MCNC: 4.1 MMOL/L — SIGNIFICANT CHANGE UP (ref 3.5–5.3)
POTASSIUM SERPL-SCNC: 4.1 MMOL/L — SIGNIFICANT CHANGE UP (ref 3.5–5.3)
PROT SERPL-MCNC: 6.6 GM/DL — SIGNIFICANT CHANGE UP (ref 6–8.3)
RBC # BLD: 4.34 M/UL — SIGNIFICANT CHANGE UP (ref 4.2–5.8)
RBC # FLD: 14.3 % — SIGNIFICANT CHANGE UP (ref 10.3–14.5)
SODIUM SERPL-SCNC: 136 MMOL/L — SIGNIFICANT CHANGE UP (ref 135–145)
SPECIMEN SOURCE: SIGNIFICANT CHANGE UP
SPECIMEN SOURCE: SIGNIFICANT CHANGE UP
WBC # BLD: 10.68 K/UL — HIGH (ref 3.8–10.5)
WBC # FLD AUTO: 10.68 K/UL — HIGH (ref 3.8–10.5)

## 2024-05-19 PROCEDURE — 74176 CT ABD & PELVIS W/O CONTRAST: CPT | Mod: 26

## 2024-05-19 PROCEDURE — 99232 SBSQ HOSP IP/OBS MODERATE 35: CPT

## 2024-05-19 RX ADMIN — SODIUM CHLORIDE 75 MILLILITER(S): 9 INJECTION INTRAMUSCULAR; INTRAVENOUS; SUBCUTANEOUS at 14:05

## 2024-05-19 RX ADMIN — HEPARIN SODIUM 5000 UNIT(S): 5000 INJECTION INTRAVENOUS; SUBCUTANEOUS at 22:06

## 2024-05-19 RX ADMIN — HEPARIN SODIUM 5000 UNIT(S): 5000 INJECTION INTRAVENOUS; SUBCUTANEOUS at 06:30

## 2024-05-19 RX ADMIN — Medication 100 MILLIGRAM(S): at 22:07

## 2024-05-19 RX ADMIN — Medication 100 MILLIGRAM(S): at 14:00

## 2024-05-19 RX ADMIN — MEROPENEM 100 MILLIGRAM(S): 1 INJECTION INTRAVENOUS at 18:17

## 2024-05-19 RX ADMIN — Medication 100 MILLIGRAM(S): at 06:29

## 2024-05-19 RX ADMIN — MEROPENEM 100 MILLIGRAM(S): 1 INJECTION INTRAVENOUS at 06:29

## 2024-05-19 RX ADMIN — TAMSULOSIN HYDROCHLORIDE 0.8 MILLIGRAM(S): 0.4 CAPSULE ORAL at 22:07

## 2024-05-19 RX ADMIN — AMLODIPINE BESYLATE 5 MILLIGRAM(S): 2.5 TABLET ORAL at 06:29

## 2024-05-19 RX ADMIN — HEPARIN SODIUM 5000 UNIT(S): 5000 INJECTION INTRAVENOUS; SUBCUTANEOUS at 14:05

## 2024-05-19 NOTE — PROGRESS NOTE ADULT - SUBJECTIVE AND OBJECTIVE BOX
Patient seen and examined bedside resting comfortably.  Feels well. Voiding without difficulty.  Reports lower abdominal pain is much better.  No fever, chills, nausea or vomiting.    T(F): 97.4 (05-19-24 @ 16:51), Max: 98.8 (05-18-24 @ 23:32)  HR: 88 (05-19-24 @ 16:51) (76 - 88)  BP: 149/84 (05-19-24 @ 16:51) (131/80 - 159/82)  RR: 18 (05-19-24 @ 16:51) (18 - 18)  SpO2: 98% (05-19-24 @ 16:51) (95% - 98%)  Wt(kg): --    POCT Blood Glucose.: 107 mg/dL (19 May 2024 16:27)  POCT Blood Glucose.: 113 mg/dL (19 May 2024 11:07)  POCT Blood Glucose.: 97 mg/dL (19 May 2024 07:37)  POCT Blood Glucose.: 135 mg/dL (18 May 2024 21:10)      PHYSICAL EXAM:  General: NAD, alert and awake  HEENT: NCAT, EOMI, conjunctiva clear  Chest: nonlabored respirations, good inspiratory effort  Abdomen: soft, NTND.   Extremities: no pedal edema or calf tenderness noted   : uncircumcised phallus, adequate meatus. No suprapubic tenderness to palpation.    LABS:                        12.4   10.68 )-----------( 257      ( 19 May 2024 07:10 )             35.8   05-19    136  |  108  |  27<H>  ----------------------------<  94  4.1   |  20<L>  |  1.92<H>    Ca    8.3<L>      19 May 2024 07:10    TPro  6.6  /  Alb  2.5<L>  /  TBili  0.4  /  DBili  x   /  AST  74<H>  /  ALT  39  /  AlkPhos  47  05-19    I&O's Detail    18 May 2024 07:01  -  19 May 2024 07:00  --------------------------------------------------------  IN:  Total IN: 0 mL    OUT:    Voided (mL): 1475 mL  Total OUT: 1475 mL    Total NET: -1475 mL      A/P 74 y/o male with BPH S/P Outpatient Cystoscopy with Dr Patterson on 5/17, presented to ER with c/o Hematuria, Fever and chills (now resolved)  h/o left staghorn kidney stone, involving the lower calices, treated with PCNL on 3/6/24, now removed prior to OP cystoscopy.  E coli bacteremia due to UTI  Leukocytosis resolved  Voiding well, PVR 11cc today. Continue flomax.  ED improving  continue abx and medical management  Per discussion with Dr Dawson, will get CTAP noncontrast to evaluate upper tracts with h/o stonee and now bacteremic  will continue to follow.

## 2024-05-19 NOTE — PROGRESS NOTE ADULT - SUBJECTIVE AND OBJECTIVE BOX
Patient is a 73y old  Male who presents with a chief complaint of urosepsis (18 May 2024 11:34)    INTERVAL HPI/OVERNIGHT EVENTS: no events     MEDICATIONS  (STANDING):  amLODIPine   Tablet 5 milliGRAM(s) Oral daily  dextrose 10% Bolus 125 milliLiter(s) IV Bolus once  dextrose 5%. 1000 milliLiter(s) (50 mL/Hr) IV Continuous <Continuous>  dextrose 50% Injectable 25 Gram(s) IV Push once  glucagon  Injectable 1 milliGRAM(s) IntraMuscular once  heparin   Injectable 5000 Unit(s) SubCutaneous every 8 hours  insulin lispro (ADMELOG) corrective regimen sliding scale   SubCutaneous three times a day before meals  labetalol 100 milliGRAM(s) Oral three times a day  meropenem  IVPB 1000 milliGRAM(s) IV Intermittent every 12 hours  sodium chloride 0.9%. 1000 milliLiter(s) (75 mL/Hr) IV Continuous <Continuous>  tamsulosin 0.8 milliGRAM(s) Oral at bedtime    MEDICATIONS  (PRN):  acetaminophen     Tablet .. 650 milliGRAM(s) Oral every 6 hours PRN Temp greater or equal to 38C (100.4F), Mild Pain (1 - 3)  aluminum hydroxide/magnesium hydroxide/simethicone Suspension 30 milliLiter(s) Oral every 4 hours PRN Dyspepsia  dextrose Oral Gel 15 Gram(s) Oral once PRN Blood Glucose LESS THAN 70 milliGRAM(s)/deciliter  melatonin 3 milliGRAM(s) Oral at bedtime PRN Insomnia  ondansetron Injectable 4 milliGRAM(s) IV Push every 8 hours PRN Nausea and/or Vomiting  senna 2 Tablet(s) Oral at bedtime PRN Constipation    Allergies    No Known Allergies    Intolerances      REVIEW OF SYSTEMS:  All other systems reviewed and are negative    Vital Signs Last 24 Hrs  T(C): 36.9 (19 May 2024 05:05), Max: 37.1 (18 May 2024 23:32)  T(F): 98.4 (19 May 2024 05:05), Max: 98.8 (18 May 2024 23:32)  HR: 81 (19 May 2024 05:05) (80 - 87)  BP: 159/82 (19 May 2024 05:05) (116/71 - 159/82)  BP(mean): --  RR: 18 (19 May 2024 05:05) (18 - 18)  SpO2: 98% (19 May 2024 05:05) (96% - 98%)    Parameters below as of 19 May 2024 05:05  Patient On (Oxygen Delivery Method): room air      Daily     Daily Weight in k.3 (19 May 2024 05:05)  I&O's Summary    18 May 2024 07:01  -  19 May 2024 07:00  --------------------------------------------------------  IN: 0 mL / OUT: 1475 mL / NET: -1475 mL      CAPILLARY BLOOD GLUCOSE      POCT Blood Glucose.: 97 mg/dL (19 May 2024 07:37)  POCT Blood Glucose.: 135 mg/dL (18 May 2024 21:10)  POCT Blood Glucose.: 120 mg/dL (18 May 2024 15:38)  POCT Blood Glucose.: 120 mg/dL (18 May 2024 10:59)    PHYSICAL EXAM:  GENERAL: NAD,    HEAD:  Atraumatic, Normocephalic  EYES: EOMI, PERRLA, conjunctiva and sclera clear  ENMT: No tonsillar erythema, exudates, or enlargement; Moist mucous membranes, Good dentition, No lesions  NECK: Supple, No JVD, Normal thyroid  NERVOUS SYSTEM:  Alert & Oriented X3, Good concentration; Motor Strength 5/5 B/L upper and lower extremities; DTRs 2+ intact and symmetric  CHEST/LUNG: Clear to percussion bilaterally; No rales, rhonchi, wheezing, or rubs  HEART: Regular rate and rhythm; No murmurs, rubs, or gallops  ABDOMEN: Soft, Nontender, Nondistended; Bowel sounds present  EXTREMITIES:  2+ Peripheral Pulses, No clubbing, cyanosis, or edema  LYMPH: No lymphadenopathy noted  SKIN: No rashes or lesions    Labs                          12.4   10.68 )-----------( 257      ( 19 May 2024 07:10 )             35.8     05    136  |  108  |  27<H>  ----------------------------<  94  4.1   |  20<L>  |  1.92<H>    Ca    8.3<L>      19 May 2024 07:10    TPro  6.6  /  Alb  2.5<L>  /  TBili  0.4  /  DBili  x   /  AST  74<H>  /  ALT  39  /  AlkPhos  47  05-19    PT/INR - ( 17 May 2024 13:15 )   PT: 11.7 sec;   INR: 0.97 ratio         PTT - ( 17 May 2024 13:15 )  PTT:28.6 sec      Urinalysis Basic - ( 19 May 2024 07:10 )    Color: x / Appearance: x / SG: x / pH: x  Gluc: 94 mg/dL / Ketone: x  / Bili: x / Urobili: x   Blood: x / Protein: x / Nitrite: x   Leuk Esterase: x / RBC: x / WBC x   Sq Epi: x / Non Sq Epi: x / Bacteria: x        Culture - Urine (collected 17 May 2024 14:04)  Source: Clean Catch Clean Catch (Midstream)  Preliminary Report (19 May 2024 10:12):    >100,000 CFU/ml Escherichia coli    Culture - Blood (collected 17 May 2024 13:10)  Source: .Blood Blood-Peripheral  Gram Stain (prelim) (18 May 2024 05:29):    Growth in anaerobic bottle: Gram Negative Rods  Preliminary Report (18 May 2024 20:30):    Growth in anaerobic bottle: Escherichia coli    See previous culture [id number]    Culture - Blood (collected 17 May 2024 13:00)  Source: .Blood Blood-Peripheral  Gram Stain (prelim) (18 May 2024 13:31):    Growth in anaerobic bottle: Gram Negative Rods    Growth in aerobic bottle: Gram Negative Rods  Preliminary Report (19 May 2024 07:37):    Growth in aerobic and anaerobic bottles: Escherichia coli Susceptibility    to follow.    Direct identification is available within approximately 3-5    hours either by Blood Panel Multiplexed PCR or Direct    MALDI-TOF. Details: [email or web address]  Organism: Blood Culture PCR (18 May 2024 06:44)  Organism: Blood Culture PCR (18 May 2024 06:44)                DVT prophylaxis: > Lovenox 40mg SQ daily  > Heparin   > SCD's

## 2024-05-19 NOTE — PROGRESS NOTE ADULT - ASSESSMENT
73 yr old male who speaks [place] Creole  hx of Benign Hypertension, Diabetes Mellitus Type2 , recent perc nephrolithotomy s/p L PCN (removed), hx of esbl, staghorn calculus.   This morning he had cystoscopy done outpatient with Dr. Patterson.  Was doing well initially but after returning home began having fever, chills and feeling unwell. Also with dark colored urine.  Denies complaints otherwise.  Felt well prior to this.    Urosepsis s/p cystoscopy, gram neg bacteremia  -c/w meropenem  -f/u cultures,    -urology consult on board, possible turp    ED  -trial of ivf , improving     HTN  -home meds    DM  -iss

## 2024-05-20 LAB
-  AMOXICILLIN/CLAVULANIC ACID: SIGNIFICANT CHANGE UP
-  AMPICILLIN/SULBACTAM: SIGNIFICANT CHANGE UP
-  AMPICILLIN/SULBACTAM: SIGNIFICANT CHANGE UP
-  AMPICILLIN: SIGNIFICANT CHANGE UP
-  AMPICILLIN: SIGNIFICANT CHANGE UP
-  AZTREONAM: SIGNIFICANT CHANGE UP
-  AZTREONAM: SIGNIFICANT CHANGE UP
-  CEFAZOLIN: SIGNIFICANT CHANGE UP
-  CEFAZOLIN: SIGNIFICANT CHANGE UP
-  CEFEPIME: SIGNIFICANT CHANGE UP
-  CEFEPIME: SIGNIFICANT CHANGE UP
-  CEFOXITIN: SIGNIFICANT CHANGE UP
-  CEFTRIAXONE: SIGNIFICANT CHANGE UP
-  CEFTRIAXONE: SIGNIFICANT CHANGE UP
-  CEFUROXIME: SIGNIFICANT CHANGE UP
-  CIPROFLOXACIN: SIGNIFICANT CHANGE UP
-  CIPROFLOXACIN: SIGNIFICANT CHANGE UP
-  ERTAPENEM: SIGNIFICANT CHANGE UP
-  ERTAPENEM: SIGNIFICANT CHANGE UP
-  GENTAMICIN: SIGNIFICANT CHANGE UP
-  GENTAMICIN: SIGNIFICANT CHANGE UP
-  IMIPENEM: SIGNIFICANT CHANGE UP
-  IMIPENEM: SIGNIFICANT CHANGE UP
-  LEVOFLOXACIN: SIGNIFICANT CHANGE UP
-  LEVOFLOXACIN: SIGNIFICANT CHANGE UP
-  MEROPENEM: SIGNIFICANT CHANGE UP
-  MEROPENEM: SIGNIFICANT CHANGE UP
-  NITROFURANTOIN: SIGNIFICANT CHANGE UP
-  NITROFURANTOIN: SIGNIFICANT CHANGE UP
-  PIPERACILLIN/TAZOBACTAM: SIGNIFICANT CHANGE UP
-  PIPERACILLIN/TAZOBACTAM: SIGNIFICANT CHANGE UP
-  TOBRAMYCIN: SIGNIFICANT CHANGE UP
-  TOBRAMYCIN: SIGNIFICANT CHANGE UP
-  TRIMETHOPRIM/SULFAMETHOXAZOLE: SIGNIFICANT CHANGE UP
-  TRIMETHOPRIM/SULFAMETHOXAZOLE: SIGNIFICANT CHANGE UP
ALBUMIN SERPL ELPH-MCNC: 2.5 G/DL — LOW (ref 3.3–5)
ALP SERPL-CCNC: 45 U/L — SIGNIFICANT CHANGE UP (ref 40–120)
ALT FLD-CCNC: 36 U/L — SIGNIFICANT CHANGE UP (ref 12–78)
ANION GAP SERPL CALC-SCNC: 6 MMOL/L — SIGNIFICANT CHANGE UP (ref 5–17)
AST SERPL-CCNC: 57 U/L — HIGH (ref 15–37)
BILIRUB SERPL-MCNC: 0.3 MG/DL — SIGNIFICANT CHANGE UP (ref 0.2–1.2)
BUN SERPL-MCNC: 28 MG/DL — HIGH (ref 7–23)
CALCIUM SERPL-MCNC: 8.4 MG/DL — LOW (ref 8.5–10.1)
CHLORIDE SERPL-SCNC: 109 MMOL/L — HIGH (ref 96–108)
CO2 SERPL-SCNC: 23 MMOL/L — SIGNIFICANT CHANGE UP (ref 22–31)
CREAT SERPL-MCNC: 2.02 MG/DL — HIGH (ref 0.5–1.3)
CULTURE RESULTS: ABNORMAL
EGFR: 34 ML/MIN/1.73M2 — LOW
GLUCOSE BLDC GLUCOMTR-MCNC: 103 MG/DL — HIGH (ref 70–99)
GLUCOSE BLDC GLUCOMTR-MCNC: 109 MG/DL — HIGH (ref 70–99)
GLUCOSE BLDC GLUCOMTR-MCNC: 116 MG/DL — HIGH (ref 70–99)
GLUCOSE BLDC GLUCOMTR-MCNC: 120 MG/DL — HIGH (ref 70–99)
GLUCOSE SERPL-MCNC: 126 MG/DL — HIGH (ref 70–99)
HCT VFR BLD CALC: 36.5 % — LOW (ref 39–50)
HGB BLD-MCNC: 12.3 G/DL — LOW (ref 13–17)
MCHC RBC-ENTMCNC: 27.8 PG — SIGNIFICANT CHANGE UP (ref 27–34)
MCHC RBC-ENTMCNC: 33.7 G/DL — SIGNIFICANT CHANGE UP (ref 32–36)
MCV RBC AUTO: 82.4 FL — SIGNIFICANT CHANGE UP (ref 80–100)
METHOD TYPE: SIGNIFICANT CHANGE UP
METHOD TYPE: SIGNIFICANT CHANGE UP
NRBC # BLD: 0 /100 WBCS — SIGNIFICANT CHANGE UP (ref 0–0)
ORGANISM # SPEC MICROSCOPIC CNT: ABNORMAL
ORGANISM # SPEC MICROSCOPIC CNT: ABNORMAL
ORGANISM # SPEC MICROSCOPIC CNT: SIGNIFICANT CHANGE UP
PLATELET # BLD AUTO: 256 K/UL — SIGNIFICANT CHANGE UP (ref 150–400)
POTASSIUM SERPL-MCNC: 4.2 MMOL/L — SIGNIFICANT CHANGE UP (ref 3.5–5.3)
POTASSIUM SERPL-SCNC: 4.2 MMOL/L — SIGNIFICANT CHANGE UP (ref 3.5–5.3)
PROT SERPL-MCNC: 6.8 GM/DL — SIGNIFICANT CHANGE UP (ref 6–8.3)
RBC # BLD: 4.43 M/UL — SIGNIFICANT CHANGE UP (ref 4.2–5.8)
RBC # FLD: 14.1 % — SIGNIFICANT CHANGE UP (ref 10.3–14.5)
SODIUM SERPL-SCNC: 138 MMOL/L — SIGNIFICANT CHANGE UP (ref 135–145)
SPECIMEN SOURCE: SIGNIFICANT CHANGE UP
WBC # BLD: 8.08 K/UL — SIGNIFICANT CHANGE UP (ref 3.8–10.5)
WBC # FLD AUTO: 8.08 K/UL — SIGNIFICANT CHANGE UP (ref 3.8–10.5)

## 2024-05-20 PROCEDURE — 99232 SBSQ HOSP IP/OBS MODERATE 35: CPT

## 2024-05-20 PROCEDURE — 99233 SBSQ HOSP IP/OBS HIGH 50: CPT | Mod: 24

## 2024-05-20 PROCEDURE — 99222 1ST HOSP IP/OBS MODERATE 55: CPT

## 2024-05-20 RX ORDER — FINASTERIDE 5 MG/1
5 TABLET, FILM COATED ORAL DAILY
Refills: 0 | Status: DISCONTINUED | OUTPATIENT
Start: 2024-05-20 | End: 2024-05-26

## 2024-05-20 RX ORDER — ERTAPENEM SODIUM 1 G/1
1000 INJECTION, POWDER, LYOPHILIZED, FOR SOLUTION INTRAMUSCULAR; INTRAVENOUS EVERY 24 HOURS
Refills: 0 | Status: DISCONTINUED | OUTPATIENT
Start: 2024-05-20 | End: 2024-05-26

## 2024-05-20 RX ADMIN — Medication 100 MILLIGRAM(S): at 06:14

## 2024-05-20 RX ADMIN — HEPARIN SODIUM 5000 UNIT(S): 5000 INJECTION INTRAVENOUS; SUBCUTANEOUS at 13:18

## 2024-05-20 RX ADMIN — TAMSULOSIN HYDROCHLORIDE 0.8 MILLIGRAM(S): 0.4 CAPSULE ORAL at 21:23

## 2024-05-20 RX ADMIN — AMLODIPINE BESYLATE 5 MILLIGRAM(S): 2.5 TABLET ORAL at 06:14

## 2024-05-20 RX ADMIN — Medication 100 MILLIGRAM(S): at 13:17

## 2024-05-20 RX ADMIN — Medication 100 MILLIGRAM(S): at 21:23

## 2024-05-20 RX ADMIN — MEROPENEM 100 MILLIGRAM(S): 1 INJECTION INTRAVENOUS at 06:14

## 2024-05-20 RX ADMIN — HEPARIN SODIUM 5000 UNIT(S): 5000 INJECTION INTRAVENOUS; SUBCUTANEOUS at 06:15

## 2024-05-20 RX ADMIN — SODIUM CHLORIDE 75 MILLILITER(S): 9 INJECTION INTRAMUSCULAR; INTRAVENOUS; SUBCUTANEOUS at 06:18

## 2024-05-20 RX ADMIN — ERTAPENEM SODIUM 120 MILLIGRAM(S): 1 INJECTION, POWDER, LYOPHILIZED, FOR SOLUTION INTRAMUSCULAR; INTRAVENOUS at 17:40

## 2024-05-20 RX ADMIN — HEPARIN SODIUM 5000 UNIT(S): 5000 INJECTION INTRAVENOUS; SUBCUTANEOUS at 21:39

## 2024-05-20 NOTE — PROGRESS NOTE ADULT - NSPROGADDITIONALINFOA_GEN_ALL_CORE
Pt had fever 103F, without hydronephrosis and positive blood culture following simple cystoscopy, indicating, Acute prostatitis.  Will continue antibiotics for 3 weeks.   CT scan reveals residual renal stone upper and lower poles without hydronephrosis.   Case was discussed with Dr Patterson as well and he concurs. Pt had fever 103F, without hydronephrosis and positive blood culture following simple cystoscopy,   Oo reviewing all the urine cultures since 08/2023, Pt has persistent bacteriuria with ESBo E coli. .   His urine culture from left kidney showed the same bacteria.   S/P PCNL 03/06/2024.   CT scan reveals residual renal stone upper and lower poles without hydronephrosis.     ? source of sepsis ? infected remaining fragments of renal stones or Prostate.    Will continue antibiotics ,postpone prostate surgery.

## 2024-05-20 NOTE — PHYSICAL THERAPY INITIAL EVALUATION ADULT - GENERAL OBSERVATIONS, REHAB EVAL
Chart (EMR) reviewed. VSS taken by PCA. Received supine c HOB elevated, NAD. +heplock. Language barrier(Creole) c video  [id number] utilized. Alert. Ox4. Able to follow multistep commands/directions.

## 2024-05-20 NOTE — PHARMACOTHERAPY INTERVENTION NOTE - COMMENTS
Pt was on meropenem, order fell off after 3 days. Recommended to start ertapenem as pt has ESBL E. coli in bcx.

## 2024-05-20 NOTE — PHYSICAL THERAPY INITIAL EVALUATION ADULT - ADDITIONAL COMMENTS
Patient lives alone in the basement of pvt house c 5 entry steps(no rail) to go down, 1 level inside. Independent c all ADL's and ambulation with cane.

## 2024-05-20 NOTE — PROVIDER CONTACT NOTE (CRITICAL VALUE NOTIFICATION) - TEST AND RESULT REPORTED:
lab/
urine c/s collected 5/17/24 Final report >[id number] E COLI ESBO
lactate 2.6
Blood culture
Blood culture in anaerobic bottle gram Negative Rods.

## 2024-05-20 NOTE — PROGRESS NOTE ADULT - ASSESSMENT
73 yr old male who speaks [place] Creole  hx of Benign Hypertension, Diabetes Mellitus Type2 , recent perc nephrolithotomy s/p L PCN (removed), hx of esbl, staghorn calculus.   This morning he had cystoscopy done outpatient with Dr. Patterson.  Was doing well initially but after returning home began having fever, chills and feeling unwell. Also with dark colored urine.  Denies complaints otherwise.  Felt well prior to this.    Urosepsis s/p cystoscopy, gram neg bacteremia  -c/w meropenem  -f/u cultures,    -urology consult on board, possible turp  ID eval placed     ED  -trial of ivf , improving     HTN  -home meds    DM  -iss

## 2024-05-20 NOTE — PROVIDER CONTACT NOTE (CRITICAL VALUE NOTIFICATION) - PERSON GIVING RESULT:
Aisha gustafson/RUBEN
Maria Dolores from [facility]
Aisha carlton
ankush win
Patito Abbott

## 2024-05-20 NOTE — PHYSICAL THERAPY INITIAL EVALUATION ADULT - NSPTDISCHREC_GEN_A_CORE
Chief complaint:   Chief Complaint   Patient presents with   • Medical Problem Re-evaluation     5 month follow up   • MEDICATION ISSUE     Needs refills and he cuts his glipizide down feels its not working right       Vitals:  Visit Vitals  BP 96/58 (BP Location: [facility] [place], Patient Position: Sitting, Cuff Size: Large Adult)   Pulse 91   Ht 5' 10\" (1.778 m)   Wt 84.2 kg   SpO2 98%   BMI 26.63 kg/m²       HISTORY OF PRESENT ILLNESS     HPI    Other significant problems:  Patient Active Problem List    Diagnosis Date Noted   • Abnormal laboratory test 12/23/2016     Priority: Low   • Type 2 diabetes mellitus without complication (CMS/[facility]) 03/29/2016     Priority: Low   • Need for prophylactic vaccination with tetanus-diphtheria (Td) 05/22/2014     Priority: Low   • Influenza with other respiratory manifestations 04/10/2014     Priority: Low   • Plantar fascia syndrome 01/31/2013     Priority: Low   • Allergic reaction to bee sting 09/04/2012     Priority: Low   • Contact dermatitis and other eczema, due to unspecified cause 09/04/2012     Priority: Low   • Rosacea, acne 09/04/2012     Priority: Low       PAST MEDICAL, FAMILY AND SOCIAL HISTORY     Medications:  Current Outpatient Medications   Medication   • pravastatin (PRAVACHOL) 40 MG tablet   • glipiZIDE (GLUCOTROL) 5 MG tablet   • metFORMIN (GLUCOPHAGE) 500 MG tablet   • blood glucose test strips (ACCU-CHEK COMFORT CURVE)   • Lancets Misc. (ACCU-CHEK FASTCLIX LANCET) Kit   • loratadine (CLARITIN) 10 MG tablet   • EPINEPHrine (EPIPEN) 0.3 MG/0.3ML CARMENZA auto-injector   • sertraline (ZOLOFT) 50 MG tablet     No current facility-administered medications for this visit.        Allergies:  ALLERGIES:   Allergen Reactions   • Sting [Bee Sting]      Hives and breathing trouble and has epi pen       Past Medical  History/Surgeries:  Past Medical History:   Diagnosis Date   • Elevated fasting glucose    • Plantar fascia syndrome 1/31/2013       Past Surgical History:   Procedure  Laterality Date   • Nasal fracture surgery         Family History:  Family History   Problem Relation Age of Onset   • Diabetes Mother    • Hypertension Mother    • Cancer Father 74        prostate   • Cancer Brother         non hodgkins lymphoma   • Diabetes Sister        Social History:  Social History     Tobacco Use   • Smoking status: Never Smoker   • Smokeless tobacco: Never Used   Substance Use Topics   • Alcohol use: Yes     Alcohol/week: 0.0 oz     Comment: once every three mopnths.       REVIEW OF SYSTEMS   History of present illness: Patient here in follow-up of his diabetes. For the last month to 2 months she is not taking the glipizide because he episodically has low sugars and feels very weak with that. No real change in activity with this. He's been feeling healthy otherwise. Weight is stable appetite is good and he is eating healthy. Gets exercise and walking       Then he tells me that at times he feels down usually in the morning but then he'll perk up. Can get upset easy with people. This is all since I Brother  a couple years ago with leukemia and then another brother in an accident and prolonged therapy and is now disabled and uses a wheelchair. Prior to that his father had been ill with the prostate cancer with surgery. Father doing well. So since that time he's had these symptoms. After the brother with the injury he did have some counseling but that culture did not feel he needed medicine.  Review of Systems   Constitutional: Negative for activity change, appetite change, chills and fever.   HENT: Negative for dental problem and trouble swallowing.    Eyes: Positive for visual disturbance (Glasses.).   Respiratory: Negative for cough, choking and wheezing.    Cardiovascular: Negative for chest pain.   Gastrointestinal: Negative for constipation, diarrhea, nausea and vomiting.   Endocrine: Negative.    Genitourinary: Negative for dysuria and hematuria.   Musculoskeletal: Negative for  arthralgias and back pain.   Skin: Negative.    Allergic/Immunologic: Negative.    Neurological: Negative for dizziness and numbness.   Hematological: Negative.    Psychiatric/Behavioral:        See history of present illness.       PHYSICAL EXAM     Physical Exam   Constitutional: He is oriented to person, place, and time. He appears well-developed and well-nourished. No distress.   HENT:   Right Ear: External ear normal.   Left Ear: External ear normal.   Nose: Nose normal.   Mouth/Throat: Oropharynx is clear and moist. No oropharyngeal exudate.   Eyes: Conjunctivae and EOM are normal. Pupils are equal, round, and reactive to light.   Neck: Normal range of motion. No tracheal deviation present. No thyromegaly present.   Cardiovascular: Normal rate, regular rhythm, normal heart sounds and intact distal pulses.   No murmur heard.  Pulmonary/Chest: Effort normal. No respiratory distress. He has no wheezes. He has no rales. He exhibits no tenderness.   Abdominal: Soft. He exhibits no distension. There is no tenderness. There is no guarding.   Musculoskeletal: Normal range of motion. He exhibits no edema or tenderness.   Lymphadenopathy:     He has no cervical adenopathy.   Neurological: He is alert and oriented to person, place, and time.   Skin: Skin is warm and dry.   Psychiatric: He has a normal mood and affect. His behavior is normal. Judgment and thought content normal.       ASSESSMENT/PLAN     Doing some lab. Starting sertraline. We'll see in 2 months to follow as to how the medication is working. Described to him that I don't expect any side effects. Then will see with the A1c shows and possibly DC glipizide and if necessary use another medication with this that is not hypoglycemic  Dionisio was seen today for medical problem re-evaluation and medication issue.    Diagnoses and all orders for this visit:    Type 2 diabetes mellitus without complication, without long-term current use of insulin (CMS/[facility])  -      GLYCOHEMOGLOBIN; Future  -     LIPID PANEL WITHOUT REFLEX; Future  -     COMPREHENSIVE METABOLIC PANEL; Future    Medication management    PTSD (post-traumatic stress disorder)    Other orders  -     sertraline (ZOLOFT) 50 MG tablet; Take 1 tablet by mouth daily. Generic please       [facility]acute Rehab

## 2024-05-20 NOTE — PHYSICAL THERAPY INITIAL EVALUATION ADULT - RANGE OF MOTION EXAMINATION, REHAB EVAL
except left knee limited due to contracture./bilateral upper extremity ROM was WFL (within functional limits)/bilateral lower extremity ROM was WFL (within functional limits)/deficits as listed below

## 2024-05-20 NOTE — CONSULT NOTE ADULT - SUBJECTIVE AND OBJECTIVE BOX
HPI:  73 yr old male who speaks Spanish Creole  hx of Benign Hypertension, Diabetes Mellitus Type2 , recent perc nephrolithotomy s/p L PCN (removed), hx of esbl, staghorn calculus.   This morning he had cystoscopy done outpatient with Dr. Patterson.  Was doing well initially but after returning home began having fever, chills and feeling unwell. Also with dark colored urine.  Denies complaints otherwise.  Felt well prior to this. (17 May 2024 15:38)      PAST MEDICAL & SURGICAL HISTORY:  HTN (hypertension)      Arthrosis      Renal stone      Type 2 diabetes mellitus      Language barrier to communication      Maltese Creole  needed      Poor historian      Poor compliance with medication      S/P left knee surgery          Allergies    No Known Allergies    Intolerances        ANTIMICROBIALS:      OTHER MEDS:  acetaminophen     Tablet .. 650 milliGRAM(s) Oral every 6 hours PRN  aluminum hydroxide/magnesium hydroxide/simethicone Suspension 30 milliLiter(s) Oral every 4 hours PRN  amLODIPine   Tablet 5 milliGRAM(s) Oral daily  dextrose 10% Bolus 125 milliLiter(s) IV Bolus once  dextrose 5%. 1000 milliLiter(s) IV Continuous <Continuous>  dextrose 50% Injectable 25 Gram(s) IV Push once  dextrose Oral Gel 15 Gram(s) Oral once PRN  glucagon  Injectable 1 milliGRAM(s) IntraMuscular once  heparin   Injectable 5000 Unit(s) SubCutaneous every 8 hours  insulin lispro (ADMELOG) corrective regimen sliding scale   SubCutaneous three times a day before meals  labetalol 100 milliGRAM(s) Oral three times a day  melatonin 3 milliGRAM(s) Oral at bedtime PRN  ondansetron Injectable 4 milliGRAM(s) IV Push every 8 hours PRN  senna 2 Tablet(s) Oral at bedtime PRN  sodium chloride 0.9%. 1000 milliLiter(s) IV Continuous <Continuous>  tamsulosin 0.8 milliGRAM(s) Oral at bedtime      SOCIAL HISTORY:    Marital Status:    Occupation:   Lives with:     Substance Use (street drugs):   Tobacco Usage:    Alcohol Usage: Social EtOH    FAMILY HISTORY:      ROS:  Unobtainable because:   All other systems negative     Constitutional: no fever, no chills, no weight loss, no night sweats  Eye: no eye pain, no redness, no vision changes  ENT:  no sore throat, no rhinorrhea  Cardiovascular:  no chest pain, no palpitation  Respiratory:  no SOB, no cough  GI:  no abd pain, no vomiting, no diarrhea  urinary: no dysuria, no hematuria, no flank pain  : no  discharge or bleeding  musculoskeletal:  no joint pain, no joint swelling  skin:  no rash  neurology:  no headache, no seizure, no change in mental status  psych: no anxiety, no depression     Physical Exam:    General:    NAD, non toxic  Head: atraumatic, normocephalic  Eyes: normal sclera and conjunctiva  ENT:   no oropharyngeal lesions, no LAD, neck supple  Cardio:    regular S1,S2, no murmur  Respiratory:   clear to auscultation b/l, no wheezing  abd:   soft, BS +, not tender, no hepatosplenomegaly  :     no CVAT, no suprapubic tenderness, no ocampo  Musculoskeletal : no joint swelling, no edema  Skin:    no rash  vascular:  normal pulses  Neurologic:     no focal deficits  psych: normal affect, no suicidal ideation      Drug Dosing Weight  Height (cm): 172.7 (17 May 2024 12:42)  Weight (kg): 90.673 (17 May 2024 12:42)  BMI (kg/m2): 30.4 (17 May 2024 12:42)  BSA (m2): 2.04 (17 May 2024 12:42)    Vital Signs Last 24 Hrs  T(F): 98.7 (05-20-24 @ 05:00), Max: 103.1 (05-17-24 @ 12:42)    Vital Signs Last 24 Hrs  HR: 74 (05-20-24 @ 05:00) (74 - 88)  BP: 173/93 (05-20-24 @ 05:00) (131/80 - 173/93)  RR: 18 (05-20-24 @ 05:00)  SpO2: 98% (05-20-24 @ 05:00) (95% - 98%)  Wt(kg): --                          12.3   8.08  )-----------( 256      ( 20 May 2024 07:06 )             36.5       05-20    138  |  109<H>  |  28<H>  ----------------------------<  126<H>  4.2   |  23  |  2.02<H>    Ca    8.4<L>      20 May 2024 07:06    TPro  6.8  /  Alb  2.5<L>  /  TBili  0.3  /  DBili  x   /  AST  57<H>  /  ALT  36  /  AlkPhos  45  05-20      Urinalysis Basic - ( 20 May 2024 07:06 )    Color: x / Appearance: x / SG: x / pH: x  Gluc: 126 mg/dL / Ketone: x  / Bili: x / Urobili: x   Blood: x / Protein: x / Nitrite: x   Leuk Esterase: x / RBC: x / WBC x   Sq Epi: x / Non Sq Epi: x / Bacteria: x        MICROBIOLOGY:  v  Clean Catch Clean Catch (Midstream)  05-17-24   >100,000 CFU/ml Escherichia coli  50,000 - 99,000 CFU/mL Enterobacter cloacae complex  --  --      .Blood Blood-Peripheral  05-17-24   Growth in anaerobic bottle: Escherichia coli  See previous culture [id number]  --    Growth in anaerobic bottle: Gram Negative Rods      .Blood Blood-Peripheral  05-17-24   Growth in aerobic and anaerobic bottles: Escherichia coli ESBL  Direct identification is available within approximately 3-5  hours either by Blood Panel Multiplexed PCR or Direct  MALDI-TOF. Details: [email or web address]  --  Blood Culture PCR  Escherichia coli ESBL                  RADIOLOGY:       HPI:  Patient is a 73 yr old male who speaks [place]an Creole ( nurse who speak Creole translated for me) with pmh of  Benign Hypertension, Diabetes Mellitus Type 2 , recent perc nephrolithotomy s/p L PCN (removed), hx of esbl, staghorn calculus.   on 5/17/2024 he had cystoscopy done outpatient with Dr. Patterson.  Was doing well initially but after returning home began having fever, chills and feeling unwell. Also with dark colored urine.  Denies complaints otherwise.  Felt well prior to this. (17 May 2024 15:38)    ID consultation is requested today for ESBL bacteremia.    patient  states he had some chills on admission  but not now, he denies any Nausea , he does c/o left flank pain that radiates to left lower abdomen region but he states it is less then  before.  He denies any dysurea, denies any diarrhea, denies any cough    denies any cough 0or sob    PAST MEDICAL & SURGICAL HISTORY:  HTN (hypertension)      Arthrosis      Renal stone      Type 2 diabetes mellitus      Language barrier to communication      Romanian Creole  needed      Poor historian      Poor compliance with medication      S/P left knee surgery          Allergies    No Known DRug Allergies    Intolerances        ANTIMICROBIALS:      OTHER MEDS:  acetaminophen     Tablet .. 650 milliGRAM(s) Oral every 6 hours PRN  aluminum hydroxide/magnesium hydroxide/simethicone Suspension 30 milliLiter(s) Oral every 4 hours PRN  amLODIPine   Tablet 5 milliGRAM(s) Oral daily  dextrose 10% Bolus 125 milliLiter(s) IV Bolus once  dextrose 5%. 1000 milliLiter(s) IV Continuous <Continuous>  dextrose 50% Injectable 25 Gram(s) IV Push once  dextrose Oral Gel 15 Gram(s) Oral once PRN  glucagon  Injectable 1 milliGRAM(s) IntraMuscular once  heparin   Injectable 5000 Unit(s) SubCutaneous every 8 hours  insulin lispro (ADMELOG) corrective regimen sliding scale   SubCutaneous three times a day before meals  labetalol 100 milliGRAM(s) Oral three times a day  melatonin 3 milliGRAM(s) Oral at bedtime PRN  ondansetron Injectable 4 milliGRAM(s) IV Push every 8 hours PRN  senna 2 Tablet(s) Oral at bedtime PRN  sodium chloride 0.9%. 1000 milliLiter(s) IV Continuous <Continuous>  tamsulosin 0.8 milliGRAM(s) Oral at bedtime      SOCIAL HISTORY:  no known toxic habits    FAMILY HISTORY:      ROS:    Constitutional: no fever, had chills, no weight loss, no night sweats  Eye: no eye pain, no redness, no vision changes  ENT:  no sore throat, no rhinorrhea  Cardiovascular:  no chest pain, no palpitation  Respiratory:  no SOB, no cough  GI:  no abd pain, no vomiting, no diarrhea  urinary: no dysuria, no hematuria, + left flank pain  : no  discharge or bleeding  musculoskeletal:  no joint pain, no joint swelling  skin:  no rash  neurology:  no headache    Physical Exam:    General:    NAD, non toxic  Head: atraumatic, normocephalic  Eyes: normal sclera and conjunctiva  ENT:   no oropharyngeal lesions, no LAD, neck supple  Cardio:    regular S1,S2, no murmur  Respiratory:   clear to auscultation b/l, no wheezing  abd:   soft, BS +, not tender, no distention  :    left CVA tenderness ,, no suprapubic tenderness, no ocampo  Musculoskeletal : no joint swelling, no edema  Skin:    no rash  vascular:  normal pulses  Neurologic:     no focal deficits  psych: normal affect      Drug Dosing Weight  Height (cm): 172.7 (17 May 2024 12:42)  Weight (kg): 90.673 (17 May 2024 12:42)  BMI (kg/m2): 30.4 (17 May 2024 12:42)  BSA (m2): 2.04 (17 May 2024 12:42)    Vital Signs Last 24 Hrs  T(F): 98.7 (05-20-24 @ 05:00), Max: 103.1 (05-17-24 @ 12:42)    Vital Signs Last 24 Hrs  HR: 74 (05-20-24 @ 05:00) (74 - 88)  BP: 173/93 (05-20-24 @ 05:00) (131/80 - 173/93)  RR: 18 (05-20-24 @ 05:00)  SpO2: 98% (05-20-24 @ 05:00) (95% - 98%)  Wt(kg): --                          12.3   8.08  )-----------( 256      ( 20 May 2024 07:06 )             36.5       05-20    138  |  109<H>  |  28<H>  ----------------------------<  126<H>  4.2   |  23  |  2.02<H>    Ca    8.4<L>      20 May 2024 07:06    TPro  6.8  /  Alb  2.5<L>  /  TBili  0.3  /  DBili  x   /  AST  57<H>  /  ALT  36  /  AlkPhos  45  05-20      Urinalysis Basic - ( 20 May 2024 07:06 )    Color: x / Appearance: x / SG: x / pH: x  Gluc: 126 mg/dL / Ketone: x  / Bili: x / Urobili: x   Blood: x / Protein: x / Nitrite: x   Leuk Esterase: x / RBC: x / WBC x   Sq Epi: x / Non Sq Epi: x / Bacteria: x        MICROBIOLOGY:    Clean Catch Clean Catch (Midstream)  05-17-24   >100,000 CFU/ml Escherichia coli  50,000 - 99,000 CFU/mL Enterobacter cloacae complex  --  --      .Blood Blood-Peripheral  05-17-24   Growth in anaerobic bottle: Escherichia coli  See previous culture [id number]  --    Growth in anaerobic bottle: Gram Negative Rods      .Blood Blood-Peripheral  05-17-24   Growth in aerobic and anaerobic bottles: Escherichia coli ESBL  Direct identification is available within approximately 3-5  hours either by Blood Panel Multiplexed PCR or Direct  MALDI-TOF. Details: [email or web address]  --  Blood Culture PCR  Escherichia coli ESBL      RADIOLOGY:  < from: CT Abdomen and Pelvis No Cont (05.19.24 @ 17:39) >    ACC: [id number] EXAM:  CT ABDOMEN AND PELVIS   ORDERED BY: Cielo Worley     PROCEDURE DATE:  05/19/2024          INTERPRETATION:  CLINICAL INFORMATION: 73 years  Male with e coli   bacteremia    evaluate upper tracts for sto. History of left staghorn   calculus treated with PCNL on 3/6/2024. Outpatient cystoscopy on   5/17/2024.    COMPARISON: Noncontrast CT abdomen and pelvis 3/7/2024    CONTRAST/COMPLICATIONS:  IV Contrast: None  Oral Contrast: None  Complications: None    PROCEDURE:  CTof the Abdomen and Pelvis was performed.  Sagittal and coronal reformats were performed.    FINDINGS:  LOWER CHEST: Trace bilateral pleural effusions unchanged.    LIVER: Within normal limits.  BILE DUCTS: Normal caliber.  GALLBLADDER: Contracted.  SPLEEN: Within normal limits.  PANCREAS: Within normal limits.  ADRENALS: Within normal limits.  KIDNEYS/URETERS: Left nephroureteral stent has been removed. 5 mm and 3   mm nonobstructing left mid renal calculi. 8 mm nonobstructing left lower   pole calculus adjacent to 3 other less than 5 mm calculi. Bilateral   nonspecific perinephric fat stranding.    BLADDER: Air-fluid level in the bladder. Punctate calcification in the   right posterior bladder (2:107), possibly calculus impacted in the right  distal ureter. No hydronephrosis.  REPRODUCTIVE ORGANS: Currently to markedly enlarged prostate.    BOWEL: No bowel obstruction. Appendix is normal.. Pancolonic   diverticulosis without diverticulitis.  PERITONEUM: No ascites.  VESSELS: Within normal limits.  RETROPERITONEUM/LYMPH NODES: No lymphadenopathy.  ABDOMINAL WALL: Fat-containing umbilical hernia. Left abdominal wall   subcutaneous fat stranding possibly injection site.  BONES: Within normal limits.    IMPRESSION:  Air-fluid level in the bladder may reflect recent instrumentation,   infection or fistula. Possible punctate bladder calculus versus right   distal ureteral calculus. No hydronephrosis.    Nonobstructing left intrarenal calculi. Left nephroureteral catheter has   been removed.    Pancolonic diverticulosis without diverticulitis.    Enlarged prostate.            --- End of Report ---            GILLES WILLIAM MD; Attending Radiologist  This document has been electronically signed. May 19 2024  6:37PM    < end of copied text >

## 2024-05-20 NOTE — PROGRESS NOTE ADULT - SUBJECTIVE AND OBJECTIVE BOX
Patient seen and examined with Dr Dawson.  Pt voiding without issue. Denies abdominal pain.   Tolerating diet, no f/c.    T(F): 99.1 (05-20-24 @ 10:36), Max: 99.1 (05-20-24 @ 10:36)  HR: 75 (05-20-24 @ 10:36) (74 - 88)  BP: 162/83 (05-20-24 @ 10:36) (149/84 - 173/93)  RR: 18 (05-20-24 @ 10:36) (18 - 18)  SpO2: 98% (05-20-24 @ 10:36) (98% - 98%)      POCT Blood Glucose.: 120 mg/dL (20 May 2024 11:22)  POCT Blood Glucose.: 109 mg/dL (20 May 2024 07:27)  POCT Blood Glucose.: 131 mg/dL (19 May 2024 21:43)  POCT Blood Glucose.: 107 mg/dL (19 May 2024 16:27)      PHYSICAL EXAM:  General: NAD, alert and awake  HEENT: NCAT, EOMI, conjunctiva clear  Chest: nonlabored respirations, good inspiratory effort  Abdomen: soft, NTND.   Extremities: no pedal edema or calf tenderness noted   : No suprapubic tenderness to palpation. Yellow urine in urinal at bedside.     LABS:                        12.3   8.08  )-----------( 256      ( 20 May 2024 07:06 )             36.5   05-20    138  |  109<H>  |  28<H>  ----------------------------<  126<H>  4.2   |  23  |  2.02<H>    Ca    8.4<L>      20 May 2024 07:06    TPro  6.8  /  Alb  2.5<L>  /  TBili  0.3  /  DBili  x   /  AST  57<H>  /  ALT  36  /  AlkPhos  45  05-20    I&O's Detail    19 May 2024 07:01  -  20 May 2024 07:00  --------------------------------------------------------  IN:  Total IN: 0 mL    OUT:    Voided (mL): 550 mL  Total OUT: 550 mL    Total NET: -550 mL    < from: CT Abdomen and Pelvis No Cont (05.19.24 @ 17:39) >  Air-fluid level in the bladder may reflect recent instrumentation,   infection or fistula. Possible punctate bladder calculus versus right   distal ureteral calculus. No hydronephrosis.    Nonobstructing left intrarenal calculi. Left nephroureteral catheter has   been removed.    Pancolonic diverticulosis without diverticulitis.    Enlarged prostate.    < end of copied text >      A/P 73M PMH BPH admitted with ESBL coli bacteremia due to acute prostatitis s/p outpatient cysto with Dr Patterson on 5/17  with known left staghorn kidney stone, treated with PCNL on 3/6/24  CT reviewed with Dr Dawson  Continue flomax.  Monitor renal function  continue invanz as per ID.  continue medical management.  may plan eventual prostate procedure as outpatient, after completing full course of antibiotics for prostatitis. No plan for OR this admission per Dr Dawson.  above discussed with patient and all questions answered       Patient seen and examined with Dr Dawson.  Pt voiding without issue. Denies abdominal pain.   Tolerating diet, no f/c.    T(F): 99.1 (05-20-24 @ 10:36), Max: 99.1 (05-20-24 @ 10:36)  HR: 75 (05-20-24 @ 10:36) (74 - 88)  BP: 162/83 (05-20-24 @ 10:36) (149/84 - 173/93)  RR: 18 (05-20-24 @ 10:36) (18 - 18)  SpO2: 98% (05-20-24 @ 10:36) (98% - 98%)      POCT Blood Glucose.: 120 mg/dL (20 May 2024 11:22)  POCT Blood Glucose.: 109 mg/dL (20 May 2024 07:27)  POCT Blood Glucose.: 131 mg/dL (19 May 2024 21:43)  POCT Blood Glucose.: 107 mg/dL (19 May 2024 16:27)      PHYSICAL EXAM:  General: NAD, alert and awake  HEENT: NCAT, EOMI, conjunctiva clear  Chest: nonlabored respirations, good inspiratory effort  Abdomen: soft, NTND.   Extremities: no pedal edema or calf tenderness noted   : No suprapubic tenderness to palpation. Yellow urine in urinal at bedside.     LABS:                        12.3   8.08  )-----------( 256      ( 20 May 2024 07:06 )             36.5   05-20    138  |  109<H>  |  28<H>  ----------------------------<  126<H>  4.2   |  23  |  2.02<H>    Ca    8.4<L>      20 May 2024 07:06    TPro  6.8  /  Alb  2.5<L>  /  TBili  0.3  /  DBili  x   /  AST  57<H>  /  ALT  36  /  AlkPhos  45  05-20    I&O's Detail    19 May 2024 07:01  -  20 May 2024 07:00  --------------------------------------------------------  IN:  Total IN: 0 mL    OUT:    Voided (mL): 550 mL  Total OUT: 550 mL    Total NET: -550 mL    < from: CT Abdomen and Pelvis No Cont (05.19.24 @ 17:39) >  Air-fluid level in the bladder may reflect recent instrumentation,   infection or fistula. Possible punctate bladder calculus versus right   distal ureteral calculus. No hydronephrosis.    Nonobstructing left intrarenal calculi. Left nephroureteral catheter has   been removed.    Pancolonic diverticulosis without diverticulitis.    Enlarged prostate.    < end of copied text >      Culture - Urine (05.17.24 @ 14:04)    Specimen Source: Clean Catch Clean Catch (Midstream)   Culture Results:   >100,000 CFU/ml Escherichia coli  50,000 - 99,000 CFU/mL Enterobacter cloacae complex    A/P 73M PMH BPH admitted with ESBL coli bacteremia due to acute prostatitis s/p outpatient cysto with Dr Patterson on 5/17  with known left staghorn kidney stone, treated with PCNL on 3/6/24  Ucx 5/17 E coli and enterobacter  Bcx 5/17 ESBL E coli, repeat cultures 5/19 negative to date  CT reviewed with Dr Dawson  Continue flomax.  Monitor renal function  continue invanz as per ID.  continue medical management.  may plan eventual prostate procedure as outpatient, after completing full course of antibiotics for prostatitis. No plan for OR this admission per Dr Dawson.  above discussed with patient and all questions answered

## 2024-05-20 NOTE — CONSULT NOTE ADULT - ASSESSMENT
A/P-  A/P 74 y/o male with BPH S/P Outpatient Cystoscopy with Dr Patterson on 5/17, presented to ER with c/o Hematuria, Fever and chills (now resolved)  h/o left staghorn kidney stone, involving the lower calices, treated with PCNL on 3/6/24, now removed prior to OP cystoscopy.    ESBL e.coli bacteremia secondary to UTI    afebrile  + leukocytosis on admission , has now resolved  Blood cx from 5/17- ESBL e.coli  urine cx- >100,000 E.coli and 50,000 enterobacter    plan-  agree with ertapenem for the ESBBL e.coli bacteremia. day #1 , day #3 of total abx.  as per med records was on meropenem for 3 days.  advise total of 7-10 days OF IV abx for the ESBL bacteremia.  check 2 more blood cx.  on flomax as per   as per CT report - no hydro  Gu follow up .  rest of the management as per medicine and  teams.      All labs and imaging and chart notes reviewed.     All above discussed with patient and patient verbalizes full understanding of all above and agrees with above plan of care.    Thank you for this consultation.    Vikki Yanez MD  Infectious Disease Attending    for any questions please do not hesitate to contact me either via teams or by calling [phone]

## 2024-05-20 NOTE — PHYSICAL THERAPY INITIAL EVALUATION ADULT - PERTINENT HX OF CURRENT PROBLEM, REHAB EVAL
73 yr old male who speaks [place] Creole  hx of Benign Hypertension, Diabetes Mellitus Type2 , recent perc nephrolithotomy s/p L PCN (removed), hx of esbl, staghorn calculus. The morning prior to admission, he had cystoscopy done outpatient with Dr. Patterson.  Was doing well initially but after returning home began having fever, chills and feeling unwell. Also with dark colored urine.

## 2024-05-20 NOTE — PROGRESS NOTE ADULT - SUBJECTIVE AND OBJECTIVE BOX
Patient is a 73y old  Male who presents with a chief complaint of urosepsis (20 May 2024 10:07)    INTERVAL HPI/OVERNIGHT EVENTS:    MEDICATIONS  (STANDING):  amLODIPine   Tablet 5 milliGRAM(s) Oral daily  dextrose 10% Bolus 125 milliLiter(s) IV Bolus once  dextrose 5%. 1000 milliLiter(s) (50 mL/Hr) IV Continuous <Continuous>  dextrose 50% Injectable 25 Gram(s) IV Push once  ertapenem  IVPB 1000 milliGRAM(s) IV Intermittent every 24 hours  glucagon  Injectable 1 milliGRAM(s) IntraMuscular once  heparin   Injectable 5000 Unit(s) SubCutaneous every 8 hours  insulin lispro (ADMELOG) corrective regimen sliding scale   SubCutaneous three times a day before meals  labetalol 100 milliGRAM(s) Oral three times a day  sodium chloride 0.9%. 1000 milliLiter(s) (75 mL/Hr) IV Continuous <Continuous>  tamsulosin 0.8 milliGRAM(s) Oral at bedtime    MEDICATIONS  (PRN):  acetaminophen     Tablet .. 650 milliGRAM(s) Oral every 6 hours PRN Temp greater or equal to 38C (100.4F), Mild Pain (1 - 3)  aluminum hydroxide/magnesium hydroxide/simethicone Suspension 30 milliLiter(s) Oral every 4 hours PRN Dyspepsia  dextrose Oral Gel 15 Gram(s) Oral once PRN Blood Glucose LESS THAN 70 milliGRAM(s)/deciliter  melatonin 3 milliGRAM(s) Oral at bedtime PRN Insomnia  ondansetron Injectable 4 milliGRAM(s) IV Push every 8 hours PRN Nausea and/or Vomiting  senna 2 Tablet(s) Oral at bedtime PRN Constipation    Allergies    No Known Allergies    Intolerances      REVIEW OF SYSTEMS:  All other systems reviewed and are negative    Vital Signs Last 24 Hrs  T(C): 37.3 (20 May 2024 10:36), Max: 37.3 (20 May 2024 10:36)  T(F): 99.1 (20 May 2024 10:36), Max: 99.1 (20 May 2024 10:36)  HR: 75 (20 May 2024 10:36) (74 - 88)  BP: 162/83 (20 May 2024 10:36) (131/80 - 173/93)  BP(mean): --  RR: 18 (20 May 2024 10:36) (18 - 18)  SpO2: 98% (20 May 2024 10:36) (95% - 98%)    Parameters below as of 20 May 2024 10:36  Patient On (Oxygen Delivery Method): room air      Daily     Daily Weight in k.3 (20 May 2024 05:00)  I&O's Summary    19 May 2024 07:01  -  20 May 2024 07:00  --------------------------------------------------------  IN: 0 mL / OUT: 550 mL / NET: -550 mL      CAPILLARY BLOOD GLUCOSE      POCT Blood Glucose.: 109 mg/dL (20 May 2024 07:27)  POCT Blood Glucose.: 131 mg/dL (19 May 2024 21:43)  POCT Blood Glucose.: 107 mg/dL (19 May 2024 16:27)  POCT Blood Glucose.: 113 mg/dL (19 May 2024 11:07)    PHYSICAL EXAM:  GENERAL: NAD,    HEAD:  Atraumatic, Normocephalic  EYES: EOMI, PERRLA, conjunctiva and sclera clear  ENMT: No tonsillar erythema, exudates, or enlargement; Moist mucous membranes, Good dentition, No lesions  NECK: Supple, No JVD, Normal thyroid  NERVOUS SYSTEM:  Alert & Oriented X3, Good concentration; Motor Strength 5/5 B/L upper and lower extremities; DTRs 2+ intact and symmetric  CHEST/LUNG: Clear to percussion bilaterally; No rales, rhonchi, wheezing, or rubs  HEART: Regular rate and rhythm; No murmurs, rubs, or gallops  ABDOMEN: Soft, Nontender, Nondistended; Bowel sounds present  EXTREMITIES:  2+ Peripheral Pulses, No clubbing, cyanosis, or edema  LYMPH: No lymphadenopathy noted  SKIN: No rashes or lesions    Labs                          12.3   8.08  )-----------( 256      ( 20 May 2024 07:06 )             36.5     05-20    138  |  109<H>  |  28<H>  ----------------------------<  126<H>  4.2   |  23  |  2.02<H>    Ca    8.4<L>      20 May 2024 07:06    TPro  6.8  /  Alb  2.5<L>  /  TBili  0.3  /  DBili  x   /  AST  57<H>  /  ALT  36  /  AlkPhos  45  05-20          Urinalysis Basic - ( 20 May 2024 07:06 )    Color: x / Appearance: x / SG: x / pH: x  Gluc: 126 mg/dL / Ketone: x  / Bili: x / Urobili: x   Blood: x / Protein: x / Nitrite: x   Leuk Esterase: x / RBC: x / WBC x   Sq Epi: x / Non Sq Epi: x / Bacteria: x        Culture - Urine (collected 17 May 2024 14:04)  Source: Clean Catch Clean Catch (Midstream)  Preliminary Report (19 May 2024 21:25):    >100,000 CFU/ml Escherichia coli    50,000 - 99,000 CFU/mL Enterobacter cloacae complex    Culture - Blood (collected 17 May 2024 13:10)  Source: .Blood Blood-Peripheral  Gram Stain (19 May 2024 16:59):    Growth in anaerobic bottle: Gram Negative Rods  Final Report (19 May 2024 16:59):    Growth in anaerobic bottle: Escherichia coli    See previous culture [id number]    Culture - Blood (collected 17 May 2024 13:00)  Source: .Blood Blood-Peripheral  Gram Stain (19 May 2024 16:58):    Growth in anaerobic bottle: Gram Negative Rods    Growth in aerobic bottle: Gram Negative Rods  Final Report (19 May 2024 16:58):    Growth in aerobic and anaerobic bottles: Escherichia coli ESBL    Direct identification is available within approximately 3-5    hours either by Blood Panel Multiplexed PCR or Direct    MALDI-TOF. Details: [email or web address]  Organism: Blood Culture PCR  Escherichia coli ESBL (19 May 2024 16:58)  Organism: Escherichia coli ESBL (19 May 2024 16:58)  Organism: Blood Culture PCR (19 May 2024 16:58)                DVT prophylaxis: > Lovenox 40mg SQ daily  > Heparin   > SCD's

## 2024-05-21 LAB
GLUCOSE BLDC GLUCOMTR-MCNC: 104 MG/DL — HIGH (ref 70–99)
GLUCOSE BLDC GLUCOMTR-MCNC: 122 MG/DL — HIGH (ref 70–99)
GLUCOSE BLDC GLUCOMTR-MCNC: 136 MG/DL — HIGH (ref 70–99)
GLUCOSE BLDC GLUCOMTR-MCNC: 194 MG/DL — HIGH (ref 70–99)

## 2024-05-21 PROCEDURE — 99233 SBSQ HOSP IP/OBS HIGH 50: CPT | Mod: 24

## 2024-05-21 PROCEDURE — 99232 SBSQ HOSP IP/OBS MODERATE 35: CPT

## 2024-05-21 RX ADMIN — Medication 2: at 16:51

## 2024-05-21 RX ADMIN — SODIUM CHLORIDE 75 MILLILITER(S): 9 INJECTION INTRAMUSCULAR; INTRAVENOUS; SUBCUTANEOUS at 08:18

## 2024-05-21 RX ADMIN — AMLODIPINE BESYLATE 5 MILLIGRAM(S): 2.5 TABLET ORAL at 05:41

## 2024-05-21 RX ADMIN — HEPARIN SODIUM 5000 UNIT(S): 5000 INJECTION INTRAVENOUS; SUBCUTANEOUS at 06:34

## 2024-05-21 RX ADMIN — Medication 100 MILLIGRAM(S): at 13:25

## 2024-05-21 RX ADMIN — HEPARIN SODIUM 5000 UNIT(S): 5000 INJECTION INTRAVENOUS; SUBCUTANEOUS at 21:31

## 2024-05-21 RX ADMIN — Medication 100 MILLIGRAM(S): at 21:31

## 2024-05-21 RX ADMIN — ERTAPENEM SODIUM 120 MILLIGRAM(S): 1 INJECTION, POWDER, LYOPHILIZED, FOR SOLUTION INTRAMUSCULAR; INTRAVENOUS at 17:17

## 2024-05-21 RX ADMIN — FINASTERIDE 5 MILLIGRAM(S): 5 TABLET, FILM COATED ORAL at 11:46

## 2024-05-21 RX ADMIN — Medication 100 MILLIGRAM(S): at 05:43

## 2024-05-21 RX ADMIN — HEPARIN SODIUM 5000 UNIT(S): 5000 INJECTION INTRAVENOUS; SUBCUTANEOUS at 13:25

## 2024-05-21 RX ADMIN — TAMSULOSIN HYDROCHLORIDE 0.8 MILLIGRAM(S): 0.4 CAPSULE ORAL at 21:32

## 2024-05-21 NOTE — PROGRESS NOTE ADULT - SUBJECTIVE AND OBJECTIVE BOX
INTERVAL HPI/OVERNIGHT EVENTS:  pt seen and examined. no acute complaints. Voiding  Denied abdominal pain, dysuria, fever or chills.     MEDICATIONS  (STANDING):  amLODIPine   Tablet 5 milliGRAM(s) Oral daily  dextrose 10% Bolus 125 milliLiter(s) IV Bolus once  dextrose 5%. 1000 milliLiter(s) (50 mL/Hr) IV Continuous <Continuous>  dextrose 50% Injectable 25 Gram(s) IV Push once  ertapenem  IVPB 1000 milliGRAM(s) IV Intermittent every 24 hours  finasteride 5 milliGRAM(s) Oral daily  glucagon  Injectable 1 milliGRAM(s) IntraMuscular once  heparin   Injectable 5000 Unit(s) SubCutaneous every 8 hours  insulin lispro (ADMELOG) corrective regimen sliding scale   SubCutaneous three times a day before meals  labetalol 100 milliGRAM(s) Oral three times a day  sodium chloride 0.9%. 1000 milliLiter(s) (75 mL/Hr) IV Continuous <Continuous>  tamsulosin 0.8 milliGRAM(s) Oral at bedtime    MEDICATIONS  (PRN):  acetaminophen     Tablet .. 650 milliGRAM(s) Oral every 6 hours PRN Temp greater or equal to 38C (100.4F), Mild Pain (1 - 3)  aluminum hydroxide/magnesium hydroxide/simethicone Suspension 30 milliLiter(s) Oral every 4 hours PRN Dyspepsia  dextrose Oral Gel 15 Gram(s) Oral once PRN Blood Glucose LESS THAN 70 milliGRAM(s)/deciliter  melatonin 3 milliGRAM(s) Oral at bedtime PRN Insomnia  ondansetron Injectable 4 milliGRAM(s) IV Push every 8 hours PRN Nausea and/or Vomiting  senna 2 Tablet(s) Oral at bedtime PRN Constipation    Vital Signs Last 24 Hrs  T(C): 36.3 (21 May 2024 10:53), Max: 37.4 (20 May 2024 17:40)  T(F): 97.3 (21 May 2024 10:53), Max: 99.3 (20 May 2024 17:40)  HR: 74 (21 May 2024 10:53) (74 - 78)  BP: 137/79 (21 May 2024 10:53) (137/79 - 148/99)  RR: 18 (21 May 2024 10:53) (18 - 19)  SpO2: 96% (21 May 2024 10:53) (96% - 98%)    Parameters below as of 21 May 2024 10:53  Patient On (Oxygen Delivery Method): room air    Physical:  General: Awake, alert. NAD.  Chest: respiration unlabored, symmetrical chest rise.   Abdomen: Soft nondistended, nontender. no suprapubic tenderness.  Ext: no calf tenderness, no edema    I&O's Detail    20 May 2024 07:01  -  21 May 2024 07:00  --------------------------------------------------------  IN:    sodium chloride 0.9%: 900 mL  Total IN: 900 mL    OUT:    Voided (mL): 800 mL  Total OUT: 800 mL    Total NET: 100 mL    LABS:                        12.3   8.08  )-----------( 256      ( 20 May 2024 07:06 )             36.5             05-20    138  |  109<H>  |  28<H>  ----------------------------<  126<H>  4.2   |  23  |  2.02<H>    Ca    8.4<L>      20 May 2024 07:06    TPro  6.8  /  Alb  2.5<L>  /  TBili  0.3  /  DBili  x   /  AST  57<H>  /  ALT  36  /  AlkPhos  45  05-20

## 2024-05-21 NOTE — PROGRESS NOTE ADULT - SUBJECTIVE AND OBJECTIVE BOX
Patient is a 73y old  Male who presents with a chief complaint of urosepsis (20 May 2024 14:06)    INTERVAL HPI/OVERNIGHT EVENTS:    MEDICATIONS  (STANDING):  amLODIPine   Tablet 5 milliGRAM(s) Oral daily  dextrose 10% Bolus 125 milliLiter(s) IV Bolus once  dextrose 5%. 1000 milliLiter(s) (50 mL/Hr) IV Continuous <Continuous>  dextrose 50% Injectable 25 Gram(s) IV Push once  ertapenem  IVPB 1000 milliGRAM(s) IV Intermittent every 24 hours  finasteride 5 milliGRAM(s) Oral daily  glucagon  Injectable 1 milliGRAM(s) IntraMuscular once  heparin   Injectable 5000 Unit(s) SubCutaneous every 8 hours  insulin lispro (ADMELOG) corrective regimen sliding scale   SubCutaneous three times a day before meals  labetalol 100 milliGRAM(s) Oral three times a day  sodium chloride 0.9%. 1000 milliLiter(s) (75 mL/Hr) IV Continuous <Continuous>  tamsulosin 0.8 milliGRAM(s) Oral at bedtime    MEDICATIONS  (PRN):  acetaminophen     Tablet .. 650 milliGRAM(s) Oral every 6 hours PRN Temp greater or equal to 38C (100.4F), Mild Pain (1 - 3)  aluminum hydroxide/magnesium hydroxide/simethicone Suspension 30 milliLiter(s) Oral every 4 hours PRN Dyspepsia  dextrose Oral Gel 15 Gram(s) Oral once PRN Blood Glucose LESS THAN 70 milliGRAM(s)/deciliter  melatonin 3 milliGRAM(s) Oral at bedtime PRN Insomnia  ondansetron Injectable 4 milliGRAM(s) IV Push every 8 hours PRN Nausea and/or Vomiting  senna 2 Tablet(s) Oral at bedtime PRN Constipation    Allergies    No Known Allergies    Intolerances      REVIEW OF SYSTEMS:  All other systems reviewed and are negative    Vital Signs Last 24 Hrs  T(C): 36.2 (21 May 2024 04:55), Max: 37.4 (20 May 2024 17:40)  T(F): 97.1 (21 May 2024 04:55), Max: 99.3 (20 May 2024 17:40)  HR: 74 (21 May 2024 04:55) (74 - 78)  BP: 145/87 (21 May 2024 04:55) (140/81 - 148/99)  BP(mean): --  RR: 19 (21 May 2024 04:55) (18 - 19)  SpO2: 97% (21 May 2024 04:55) (97% - 98%)    Parameters below as of 21 May 2024 04:55  Patient On (Oxygen Delivery Method): room air      Daily     Daily   I&O's Summary    20 May 2024 07:01  -  21 May 2024 07:00  --------------------------------------------------------  IN: 900 mL / OUT: 800 mL / NET: 100 mL      CAPILLARY BLOOD GLUCOSE      POCT Blood Glucose.: 104 mg/dL (21 May 2024 07:46)  POCT Blood Glucose.: 116 mg/dL (20 May 2024 22:06)  POCT Blood Glucose.: 103 mg/dL (20 May 2024 16:42)  POCT Blood Glucose.: 120 mg/dL (20 May 2024 11:22)    PHYSICAL EXAM:  GENERAL: NAD,    HEAD:  Atraumatic, Normocephalic  EYES: EOMI, PERRLA, conjunctiva and sclera clear  ENMT: No tonsillar erythema, exudates, or enlargement; Moist mucous membranes, Good dentition, No lesions  NECK: Supple, No JVD, Normal thyroid  NERVOUS SYSTEM:  Alert & Oriented X3, Good concentration; Motor Strength 5/5 B/L upper and lower extremities; DTRs 2+ intact and symmetric  CHEST/LUNG: Clear to percussion bilaterally; No rales, rhonchi, wheezing, or rubs  HEART: Regular rate and rhythm; No murmurs, rubs, or gallops  ABDOMEN: Soft, Nontender, Nondistended; Bowel sounds present  EXTREMITIES:  2+ Peripheral Pulses, No clubbing, cyanosis, or edema  LYMPH: No lymphadenopathy noted  SKIN: No rashes or lesions    Labs                          12.3   8.08  )-----------( 256      ( 20 May 2024 07:06 )             36.5     05-20    138  |  109<H>  |  28<H>  ----------------------------<  126<H>  4.2   |  23  |  2.02<H>    Ca    8.4<L>      20 May 2024 07:06    TPro  6.8  /  Alb  2.5<L>  /  TBili  0.3  /  DBili  x   /  AST  57<H>  /  ALT  36  /  AlkPhos  45  05-20          Urinalysis Basic - ( 20 May 2024 07:06 )    Color: x / Appearance: x / SG: x / pH: x  Gluc: 126 mg/dL / Ketone: x  / Bili: x / Urobili: x   Blood: x / Protein: x / Nitrite: x   Leuk Esterase: x / RBC: x / WBC x   Sq Epi: x / Non Sq Epi: x / Bacteria: x        Culture - Blood (collected 19 May 2024 07:42)  Source: .Blood Blood  Preliminary Report (20 May 2024 13:02):    No growth at 24 hours    Culture - Blood (collected 19 May 2024 07:41)  Source: .Blood Blood  Preliminary Report (20 May 2024 13:02):    No growth at 24 hours                DVT prophylaxis: > Lovenox 40mg SQ daily  > Heparin   > SCD's

## 2024-05-21 NOTE — PROGRESS NOTE ADULT - ASSESSMENT
73M with BPH s/p Outpatient Cystoscopy with Dr Patterson on 5/17, presented to ER with c/o Hematuria, Fever and chills (now resolved)  h/o left staghorn kidney stone, involving the lower calices, treated with PCNL on 3/6/24, now removed prior to OP cystoscopy.  E coli bacteremia due to UTI  Leukocytosis resolved, voiding    Continue abx and medical management  Monitor renal function  Continue IV abx per ID  No OR planned for this admission due to proctitis. Patient may follow up as an outpatient for prostate intervention once infection resolved and antibiotics are complete.  Discussed with Dr. Lucas 73M with BPH s/p Outpatient Cystoscopy with Dr Patterson on 5/17, presented to ER with c/o Hematuria, Fever and chills found to have acute proctitis   h/o left staghorn kidney stone, involving the lower calices, treated with PCNL on 3/6/24, now removed prior to OP cystoscopy.  E coli bacteremia due to UTI  Leukocytosis resolved, Afebrile,  Voiding    Continue abx and medical management  Monitor renal function  Continue IV abx per ID  No OR planned for this admission due to proctitis. Patient may follow up as an outpatient for prostate intervention once infection resolved and antibiotics are complete.  Discussed with Dr. Lucas 73M with BPH s/p Outpatient Cystoscopy with Dr Patterson on 5/17, presented to ER with c/o Hematuria, Fever and chills found to have acute prostatitis  h/o left staghorn kidney stone, involving the lower calices, treated with PCNL on 3/6/24, now removed prior to OP cystoscopy.  E coli bacteremia due to UTI  Leukocytosis resolved, Afebrile,  Voiding    Continue abx and medical management  Monitor renal function  Continue IV abx per ID  No OR planned for this admission due to prostatitis. Patient may follow up as an outpatient for prostate intervention once infection resolved and antibiotics are complete.  Discussed with Dr. Lucas

## 2024-05-21 NOTE — PROGRESS NOTE ADULT - ASSESSMENT
73 yr old male who speaks [place] Creole  hx of Benign Hypertension, Diabetes Mellitus Type2 , recent perc nephrolithotomy s/p L PCN (removed), hx of esbl, staghorn calculus.   This morning he had cystoscopy done outpatient with Dr. Patterson.  Was doing well initially but after returning home began having fever, chills and feeling unwell. Also with dark colored urine.  Denies complaints otherwise.  Felt well prior to this.    Urosepsis s/p cystoscopy, gram neg bacteremia ESBL  -c/w ertapenem   -urology consult on board: may plan eventual prostate procedure as outpatient, after completing full course of antibiotics for prostatitis. As outpatient   ID brooke appreciated     ED  -trial of ivf , improving     HTN  -home meds    DM  -iss

## 2024-05-22 LAB
ANION GAP SERPL CALC-SCNC: 9 MMOL/L — SIGNIFICANT CHANGE UP (ref 5–17)
BUN SERPL-MCNC: 34 MG/DL — HIGH (ref 7–23)
CALCIUM SERPL-MCNC: 8.6 MG/DL — SIGNIFICANT CHANGE UP (ref 8.5–10.1)
CHLORIDE SERPL-SCNC: 108 MMOL/L — SIGNIFICANT CHANGE UP (ref 96–108)
CO2 SERPL-SCNC: 21 MMOL/L — LOW (ref 22–31)
CREAT SERPL-MCNC: 1.96 MG/DL — HIGH (ref 0.5–1.3)
EGFR: 35 ML/MIN/1.73M2 — LOW
GLUCOSE BLDC GLUCOMTR-MCNC: 111 MG/DL — HIGH (ref 70–99)
GLUCOSE BLDC GLUCOMTR-MCNC: 141 MG/DL — HIGH (ref 70–99)
GLUCOSE BLDC GLUCOMTR-MCNC: 144 MG/DL — HIGH (ref 70–99)
GLUCOSE BLDC GLUCOMTR-MCNC: 181 MG/DL — HIGH (ref 70–99)
GLUCOSE SERPL-MCNC: 146 MG/DL — HIGH (ref 70–99)
HCT VFR BLD CALC: 37.3 % — LOW (ref 39–50)
HGB BLD-MCNC: 12.7 G/DL — LOW (ref 13–17)
MCHC RBC-ENTMCNC: 28.2 PG — SIGNIFICANT CHANGE UP (ref 27–34)
MCHC RBC-ENTMCNC: 34 G/DL — SIGNIFICANT CHANGE UP (ref 32–36)
MCV RBC AUTO: 82.7 FL — SIGNIFICANT CHANGE UP (ref 80–100)
NRBC # BLD: 0 /100 WBCS — SIGNIFICANT CHANGE UP (ref 0–0)
PLATELET # BLD AUTO: 279 K/UL — SIGNIFICANT CHANGE UP (ref 150–400)
POTASSIUM SERPL-MCNC: 4.4 MMOL/L — SIGNIFICANT CHANGE UP (ref 3.5–5.3)
POTASSIUM SERPL-SCNC: 4.4 MMOL/L — SIGNIFICANT CHANGE UP (ref 3.5–5.3)
RBC # BLD: 4.51 M/UL — SIGNIFICANT CHANGE UP (ref 4.2–5.8)
RBC # FLD: 13.8 % — SIGNIFICANT CHANGE UP (ref 10.3–14.5)
SODIUM SERPL-SCNC: 138 MMOL/L — SIGNIFICANT CHANGE UP (ref 135–145)
WBC # BLD: 8 K/UL — SIGNIFICANT CHANGE UP (ref 3.8–10.5)
WBC # FLD AUTO: 8 K/UL — SIGNIFICANT CHANGE UP (ref 3.8–10.5)

## 2024-05-22 PROCEDURE — 99232 SBSQ HOSP IP/OBS MODERATE 35: CPT | Mod: 24

## 2024-05-22 PROCEDURE — 99232 SBSQ HOSP IP/OBS MODERATE 35: CPT

## 2024-05-22 RX ADMIN — HEPARIN SODIUM 5000 UNIT(S): 5000 INJECTION INTRAVENOUS; SUBCUTANEOUS at 14:25

## 2024-05-22 RX ADMIN — Medication 100 MILLIGRAM(S): at 14:25

## 2024-05-22 RX ADMIN — HEPARIN SODIUM 5000 UNIT(S): 5000 INJECTION INTRAVENOUS; SUBCUTANEOUS at 05:46

## 2024-05-22 RX ADMIN — Medication 2: at 07:57

## 2024-05-22 RX ADMIN — Medication 100 MILLIGRAM(S): at 05:46

## 2024-05-22 RX ADMIN — Medication 100 MILLIGRAM(S): at 21:56

## 2024-05-22 RX ADMIN — ERTAPENEM SODIUM 120 MILLIGRAM(S): 1 INJECTION, POWDER, LYOPHILIZED, FOR SOLUTION INTRAMUSCULAR; INTRAVENOUS at 18:22

## 2024-05-22 RX ADMIN — TAMSULOSIN HYDROCHLORIDE 0.8 MILLIGRAM(S): 0.4 CAPSULE ORAL at 21:56

## 2024-05-22 RX ADMIN — FINASTERIDE 5 MILLIGRAM(S): 5 TABLET, FILM COATED ORAL at 12:10

## 2024-05-22 RX ADMIN — AMLODIPINE BESYLATE 5 MILLIGRAM(S): 2.5 TABLET ORAL at 05:46

## 2024-05-22 RX ADMIN — HEPARIN SODIUM 5000 UNIT(S): 5000 INJECTION INTRAVENOUS; SUBCUTANEOUS at 21:56

## 2024-05-22 NOTE — PROGRESS NOTE ADULT - SUBJECTIVE AND OBJECTIVE BOX
[facility] creole  [id number]  Patient seen and examined bedside resting comfortably.  No complaints offered.       T(F): 97.5 (05-22-24 @ 05:17), Max: 98.8 (05-21-24 @ 16:18)  HR: 63 (05-22-24 @ 05:17) (63 - 100)  BP: 124/71 (05-22-24 @ 05:17) (124/71 - 163/83)  RR: 17 (05-22-24 @ 05:17) (17 - 20)  SpO2: 97% (05-22-24 @ 05:17) (95% - 100%)  Wt(kg): --  CAPILLARY BLOOD GLUCOSE      POCT Blood Glucose.: 181 mg/dL (22 May 2024 07:38)  POCT Blood Glucose.: 136 mg/dL (21 May 2024 22:38)  POCT Blood Glucose.: 194 mg/dL (21 May 2024 16:44)  POCT Blood Glucose.: 122 mg/dL (21 May 2024 11:09)      PHYSICAL EXAM:  General: NAD  Neuro:  Alert & oriented x 3  HEENT: NCAT, EOMI, conjunctiva clear  Lung:respirations nonlabored, good inspiratory effort  GI: soft, nondistended, nontender  : no suprapubic tenderness  Extremities: no pedal edema or calf tenderness noted     LABS:                        12.7   8.00  )-----------( 279      ( 22 May 2024 06:55 )             37.3               I&O:     Culture Results:   No growth at 48 Hours (05-19 @ 07:42)  Culture Results:   No growth at 48 Hours (05-19 @ 07:41)  Culture Results:   >100,000 CFU/ml Escherichia coli ESBL  50,000 - 99,000 CFU/mL Enterobacter cloacae complex *!* (05-17 @ 14:04)  Culture Results:   Growth in anaerobic bottle: Escherichia coli  See previous culture [id number] *!* (05-17 @ 13:10)  Culture Results:   Growth in aerobic and anaerobic bottles: Escherichia coli ESBL  Direct identification is available within approximately 3-5  hours either by Blood Panel Multiplexed PCR or Direct  MALDI-TOF. Details: [email or web address] *!* (05-17 @ 13:00)        73M with BPH s/p Outpatient Cystoscopy with Dr Patterson on 5/17, presented to ER with c/o Hematuria, Fever and chills found to have acute proctitis   h/o left staghorn kidney stone, involving the lower calices, treated with PCNL on 3/6/24, now removed prior to OP cystoscopy.  E coli bacteremia due to UTI. rpt Bcx 5/19 negative.  hematuria & Leukocytosis resolved, Afebrile  -c/w invanz for 7-10 days as an outpt per ID, pt will need a midline.   -c/w flomax and finasteride   -d/c planning with Iv antibiotics, with follow up outpt with Dr patterson   -continue care per primary team

## 2024-05-22 NOTE — PROGRESS NOTE ADULT - ASSESSMENT
73 yr old male who speaks [place] Creole  hx of Benign Hypertension, Diabetes Mellitus Type2 , recent perc nephrolithotomy s/p L PCN (removed), hx of esbl, staghorn calculus.   This morning he had cystoscopy done outpatient with Dr. Patterson.  Was doing well initially but after returning home began having fever, chills and feeling unwell. Also with dark colored urine.  Denies complaints otherwise.  Felt well prior to this.  plan-  continue with ertapenem for the ESBBL e.coli bacteremia. day #3 , day #6 of total abx.  as per med records was on meropenem for 3 days.  advise total of 10 days OF IV abx for the ESBL   hence 4 more days of IV ertapenem.  rest of the management as per medicine and  teams.    Urosepsis s/p cystoscopy, gram neg bacteremia ESBL  -c/w ertapenem   -urology consult on board: may plan eventual prostate procedure as outpatient, after completing full course of antibiotics for prostatitis. As outpatient   ID brooke appreciated     ED  -trial of ivf , improving     HTN  -home meds    DM  -iss

## 2024-05-22 NOTE — PROGRESS NOTE ADULT - ASSESSMENT
A/P-  A/P 74 y/o male with BPH S/P Outpatient Cystoscopy with Dr Patterson on 5/17, presented to ER with c/o Hematuria, Fever and chills (now resolved)  h/o left staghorn kidney stone, involving the lower calices, treated with PCNL on 3/6/24, now removed prior to OP cystoscopy.    ESBL e.coli bacteremia secondary to UTI    clinically much improved  afebrile  + leukocytosis on admission , has  resolved  Blood cx from 5/17- ESBL e.coli x 2  urine cx- >100,000 E.coli and 50,000 enterobacter  repeat blood cx 5/19/2024- NGTD x 2    plan-  continue with ertapenem for the ESBBL e.coli bacteremia. day #3 , day #6 of total abx.  as per med records was on meropenem for 3 days.  advise total of 10 days OF IV abx for the ESBL bacteremia.  hence 4 more days of IV ertapenem.  rest of the management as per medicine and  teams.      All labs and imaging and chart notes reviewed.     All above discussed with patient and patient verbalizes full understanding of all above and agrees with above plan of care.        Vikki Yanez MD  Infectious Disease Attending    for any questions please do not hesitate to contact me either via teams or by calling [phone]

## 2024-05-22 NOTE — PROGRESS NOTE ADULT - SUBJECTIVE AND OBJECTIVE BOX
HPI:  73 yr old male who speaks [place] Creole  hx of Benign Hypertension, Diabetes Mellitus Type2 , recent perc nephrolithotomy s/p L PCN (removed), hx of esbl, staghorn calculus.   This morning he had cystoscopy done outpatient with Dr. Patterson.  Was doing well initially but after returning home began having fever, chills and feeling unwell. Also with dark colored urine.  Denies complaints otherwise.  Felt well prior to this. (17 May 2024 15:38)  Patient is a 73y old  Male who presents with a chief complaint of urosepsis (22 May 2024 09:44)      INTERVAL HPI/OVERNIGHT EVENTS: no acute events     MEDICATIONS  (STANDING):  amLODIPine   Tablet 5 milliGRAM(s) Oral daily  dextrose 10% Bolus 125 milliLiter(s) IV Bolus once  dextrose 5%. 1000 milliLiter(s) (50 mL/Hr) IV Continuous <Continuous>  dextrose 50% Injectable 25 Gram(s) IV Push once  ertapenem  IVPB 1000 milliGRAM(s) IV Intermittent every 24 hours  finasteride 5 milliGRAM(s) Oral daily  glucagon  Injectable 1 milliGRAM(s) IntraMuscular once  heparin   Injectable 5000 Unit(s) SubCutaneous every 8 hours  insulin lispro (ADMELOG) corrective regimen sliding scale   SubCutaneous three times a day before meals  labetalol 100 milliGRAM(s) Oral three times a day  sodium chloride 0.9%. 1000 milliLiter(s) (75 mL/Hr) IV Continuous <Continuous>  tamsulosin 0.8 milliGRAM(s) Oral at bedtime    MEDICATIONS  (PRN):  acetaminophen     Tablet .. 650 milliGRAM(s) Oral every 6 hours PRN Temp greater or equal to 38C (100.4F), Mild Pain (1 - 3)  aluminum hydroxide/magnesium hydroxide/simethicone Suspension 30 milliLiter(s) Oral every 4 hours PRN Dyspepsia  dextrose Oral Gel 15 Gram(s) Oral once PRN Blood Glucose LESS THAN 70 milliGRAM(s)/deciliter  melatonin 3 milliGRAM(s) Oral at bedtime PRN Insomnia  ondansetron Injectable 4 milliGRAM(s) IV Push every 8 hours PRN Nausea and/or Vomiting  senna 2 Tablet(s) Oral at bedtime PRN Constipation      Allergies    No Known Allergies    Intolerances        REVIEW OF SYSTEMS:  CONSTITUTIONAL: No fever, weight loss, or fatigue  EYES: No eye pain, visual disturbances, or discharge  ENMT:  No difficulty hearing, tinnitus, vertigo; No sinus or throat pain  NECK: No pain or stiffness  BREASTS: No pain, masses, or nipple discharge  RESPIRATORY: No cough, wheezing, chills or hemoptysis; No shortness of breath  CARDIOVASCULAR: No chest pain, palpitations, dizziness, or leg swelling  GASTROINTESTINAL: No abdominal or epigastric pain. No nausea, vomiting, or hematemesis; No diarrhea or constipation. No melena or hematochezia.  GENITOURINARY: No dysuria, frequency, hematuria, or incontinence  NEUROLOGICAL: No headaches, memory loss, loss of strength, numbness, or tremors  SKIN: No itching, burning, rashes, or lesions   LYMPH NODES: No enlarged glands  ENDOCRINE: No heat or cold intolerance; No hair loss  MUSCULOSKELETAL: No joint pain or swelling; No muscle, back, or extremity pain  PSYCHIATRIC: No depression, anxiety, mood swings, or difficulty sleeping  HEME/LYMPH: No easy bruising, or bleeding gums  ALLERGY AND IMMUNOLOGIC: No hives or eczema    Vital Signs Last 24 Hrs  T(C): 36.7 (22 May 2024 17:14), Max: 37.1 (22 May 2024 11:20)  T(F): 98 (22 May 2024 17:14), Max: 98.8 (22 May 2024 11:20)  HR: 69 (22 May 2024 17:14) (63 - 75)  BP: 138/82 (22 May 2024 17:14) (120/69 - 155/77)  RR: 16 (22 May 2024 17:14) (16 - 18)  SpO2: 96% (22 May 2024 17:14) (96% - 97%)    Parameters below as of 22 May 2024 17:14  Patient On (Oxygen Delivery Method): room air        PHYSICAL EXAM:  GENERAL: NAD, well-groomed, well-developed  HEAD:  Atraumatic, Normocephalic  EYES: EOMI, PERRLA, conjunctiva and sclera clear  ENMT: No tonsillar erythema, exudates, or enlargement; Moist mucous membranes, Good dentition, No lesions  NECK: Supple, No JVD, Normal thyroid  NERVOUS SYSTEM:  Alert & Oriented X3, Good concentration; Motor Strength 5/5 B/L upper and lower extremities; DTRs 2+ intact and symmetric  CHEST/LUNG: Clear to ascultation  bilaterally; No rales, rhonchi, wheezing, or rubs  HEART: Regular rate and rhythm; No murmurs, rubs, or gallops  ABDOMEN: Soft, Nontender, Nondistended; Bowel sounds present  EXTREMITIES:  2+ Peripheral Pulses, No clubbing, cyanosis, or edema  LYMPH: No lymphadenopathy noted  SKIN: No rashes or lesions    LABS:                        12.7   8.00  )-----------( 279      ( 22 May 2024 06:55 )             37.3     05-22    138  |  108  |  34<H>  ----------------------------<  146<H>  4.4   |  21<L>  |  1.96<H>    Ca    8.6      22 May 2024 06:55        Urinalysis Basic - ( 22 May 2024 06:55 )    Color: x / Appearance: x / SG: x / pH: x  Gluc: 146 mg/dL / Ketone: x  / Bili: x / Urobili: x   Blood: x / Protein: x / Nitrite: x   Leuk Esterase: x / RBC: x / WBC x   Sq Epi: x / Non Sq Epi: x / Bacteria: x      CAPILLARY BLOOD GLUCOSE      POCT Blood Glucose.: 111 mg/dL (22 May 2024 16:07)  POCT Blood Glucose.: 144 mg/dL (22 May 2024 11:03)  POCT Blood Glucose.: 181 mg/dL (22 May 2024 07:38)  POCT Blood Glucose.: 136 mg/dL (21 May 2024 22:38)      RADIOLOGY & ADDITIONAL TESTS:    Imaging Personally Reviewed:  [ ] YES  [ ] NO    Consultant(s) Notes Reviewed:  [ ] YES  [ ] NO    Care Discussed with Consultants/Other Providers [ ] YES  [ ] NO

## 2024-05-22 NOTE — PROGRESS NOTE ADULT - SUBJECTIVE AND OBJECTIVE BOX
[facility]  INFECTIOUS DISEASES   [address] [place] [address]  Tel: [phone]     Fax: [phone]  ==============================================================================  MD Trace Arellano, DO Mary Porter, NP   ==============================================================================      PLACIDE, CLAUDE  MRN-[id number]  73y ([birth year])      Interval History:    ROS:    [ ] Unobtainable because:  [ ] All other systems negative except as noted    Constitutional: no fever, no chills  Head: no trauma  Eyes: no vision changes, no eye pain  ENT:  no sore throat, no rhinorrhea  Cardiovascular:  no chest pain, no palpitation  Respiratory:  no SOB, no cough  GI:  no abd pain, no vomiting, no diarrhea  urinary: no dysuria, no hematuria, no flank pain  musculoskeletal:  no joint pain, no joint swelling  skin:  no rash  neurology:  no headache, no seizure, no change in mental status  psych: no anxiety, no depression         Allergies  No Known Allergies        ANTIMICROBIALS:  ertapenem  IVPB 1000 every 24 hours        Physical Exam:  Vital Signs Last 24 Hrs  T(C): 36.4 (22 May 2024 05:17), Max: 37.1 (21 May 2024 16:18)  T(F): 97.5 (22 May 2024 05:17), Max: 98.8 (21 May 2024 16:18)  HR: 63 (22 May 2024 05:17) (63 - 100)  BP: 124/71 (22 May 2024 05:17) (124/71 - 163/83)  BP(mean): --  RR: 17 (22 May 2024 05:17) (17 - 20)  SpO2: 97% (22 May 2024 05:17) (95% - 100%)    Parameters below as of 21 May 2024 16:18  Patient On (Oxygen Delivery Method): room air        General:    NAD,  non toxic  Head: atraumatic, normocephalic  Eye: normal sclera and conjunctiva  ENT:    no oral lesions, neck supple  Cardio:     regular S1, S2,  no murmur  Respiratory:    clear b/l,    no wheezing  abd:     soft,   BS +,   no tenderness  :   no CVAT,  no suprapubic tenderness,   no  ocampo  Musculoskeletal:   no joint swelling,   no edema  vascular: no central lines, +PIV   Skin:    no rash  Neurologic:     no focal deficit  psych: normal affect    WBC Count: 8.00 K/uL (05-22 @ 06:55)  WBC Count: 8.08 K/uL (05-20 @ 07:06)  WBC Count: 10.68 K/uL (05-19 @ 07:10)  WBC Count: 17.88 K/uL (05-18 @ 06:20)  WBC Count: 11.41 K/uL (05-17 @ 13:15)                            12.7   8.00  )-----------( 279      ( 22 May 2024 06:55 )             37.3       05-22    138  |  108  |  34<H>  ----------------------------<  146<H>  4.4   |  21<L>  |  1.96<H>    Ca    8.6      22 May 2024 06:55        Urinalysis Basic - ( 22 May 2024 06:55 )    Color: x / Appearance: x / SG: x / pH: x  Gluc: 146 mg/dL / Ketone: x  / Bili: x / Urobili: x   Blood: x / Protein: x / Nitrite: x   Leuk Esterase: x / RBC: x / WBC x   Sq Epi: x / Non Sq Epi: x / Bacteria: x          Creatinine Trend: 1.96<--, 2.02<--, 1.92<--, 2.18<--, 2.48<--      MICROBIOLOGY:  v  .Blood Blood  05-19-24   No growth at 48 Hours  --  --      .Blood Blood  05-19-24   No growth at 48 Hours  --  --      Clean Catch Clean Catch (Midstream)  05-17-24   >100,000 CFU/ml Escherichia coli ESBL  50,000 - 99,000 CFU/mL Enterobacter cloacae complex  --  Escherichia coli ESBL  Enterobacter cloacae complex      .Blood Blood-Peripheral  05-17-24   Growth in anaerobic bottle: Escherichia coli  See previous culture [id number]  --    Growth in anaerobic bottle: Gram Negative Rods      .Blood Blood-Peripheral  05-17-24   Growth in aerobic and anaerobic bottles: Escherichia coli ESBL  Direct identification is available within approximately 3-5  hours either by Blood Panel Multiplexed PCR or Direct  MALDI-TOF. Details: [email or web address]  --  Blood Culture PCR  Escherichia coli ESBL                                  RADIOLOGY:   [facility]  INFECTIOUS DISEASES   [address] [place] [address]  Tel: [phone]     Fax: [phone]  ==============================================================================  MD Trace Arellano, DO Mary Porter, NP   ==============================================================================      PLACIDE, CLAUDE  MRN-[id number]  73y ([birth year])      Interval History:  Patient seen and examined.  he states he feels well.  denies  any fever or chills  denies any nausea  denies any dysurea  denies any diarrhea  denies any flank pain    ROS:    [ ] Unobtainable because:  [ x] All other systems negative except as noted    Constitutional: no fever, no chills  Head: no trauma  Eyes: no vision changes, no eye pain  ENT:  no sore throat, no rhinorrhea  Cardiovascular:  no chest pain, no palpitation  Respiratory:  no SOB, no cough  GI:  no abd pain, no vomiting, no diarrhea  urinary: no dysuria, no hematuria, no flank pain  musculoskeletal:  no joint pain, no joint swelling  skin:  no rash  neurology:  no headache, no seizure, no change in mental status  psych: no anxiety, no depression         Allergies  No Known Allergies        ANTIMICROBIALS:  ertapenem  IVPB 1000 every 24 hours        Physical Exam:  Vital Signs Last 24 Hrs  T(C): 36.4 (22 May 2024 05:17), Max: 37.1 (21 May 2024 16:18)  T(F): 97.5 (22 May 2024 05:17), Max: 98.8 (21 May 2024 16:18)  HR: 63 (22 May 2024 05:17) (63 - 100)  BP: 124/71 (22 May 2024 05:17) (124/71 - 163/83)  BP(mean): --  RR: 17 (22 May 2024 05:17) (17 - 20)  SpO2: 97% (22 May 2024 05:17) (95% - 100%)    Parameters below as of 21 May 2024 16:18  Patient On (Oxygen Delivery Method): room air        General:    NAD, non toxic  Head: atraumatic, normocephalic  Eyes: normal sclera and conjunctiva  ENT:   no oropharyngeal lesions, no LAD, neck supple  Cardio:    regular S1,S2, no murmur  Respiratory:   clear to auscultation b/l, no wheezing  abd:   soft, BS +, not tender, no distention  :    no CVA tenderness, no suprapubic tenderness, no ocampo  Musculoskeletal : no joint swelling, no edema  Skin:    no rash  vascular:  normal pulses  Neurologic:     no focal deficits  psych: normal affect      WBC Count: 8.00 K/uL (05-22 @ 06:55)  WBC Count: 8.08 K/uL (05-20 @ 07:06)  WBC Count: 10.68 K/uL (05-19 @ 07:10)  WBC Count: 17.88 K/uL (05-18 @ 06:20)  WBC Count: 11.41 K/uL (05-17 @ 13:15)                            12.7   8.00  )-----------( 279      ( 22 May 2024 06:55 )             37.3       05-22    138  |  108  |  34<H>  ----------------------------<  146<H>  4.4   |  21<L>  |  1.96<H>    Ca    8.6      22 May 2024 06:55        Urinalysis Basic - ( 22 May 2024 06:55 )    Color: x / Appearance: x / SG: x / pH: x  Gluc: 146 mg/dL / Ketone: x  / Bili: x / Urobili: x   Blood: x / Protein: x / Nitrite: x   Leuk Esterase: x / RBC: x / WBC x   Sq Epi: x / Non Sq Epi: x / Bacteria: x          Creatinine Trend: 1.96<--, 2.02<--, 1.92<--, 2.18<--, 2.48<--      MICROBIOLOGY:    .Blood Blood  05-19-24   No growth at 48 Hours  --  --      .Blood Blood  05-19-24   No growth at 48 Hours  --  --      Clean Catch Clean Catch (Midstream)  05-17-24   >100,000 CFU/ml Escherichia coli ESBL  50,000 - 99,000 CFU/mL Enterobacter cloacae complex  --  Escherichia coli ESBL  Enterobacter cloacae complex      .Blood Blood-Peripheral  05-17-24   Growth in anaerobic bottle: Escherichia coli  See previous culture [id number]  --    Growth in anaerobic bottle: Gram Negative Rods      .Blood Blood-Peripheral  05-17-24   Growth in aerobic and anaerobic bottles: Escherichia coli ESBL  Direct identification is available within approximately 3-5  hours either by Blood Panel Multiplexed PCR or Direct  MALDI-TOF. Details: [email or web address]  --  Blood Culture PCR  Escherichia coli ESBL

## 2024-05-23 LAB
ANION GAP SERPL CALC-SCNC: 7 MMOL/L — SIGNIFICANT CHANGE UP (ref 5–17)
BUN SERPL-MCNC: 35 MG/DL — HIGH (ref 7–23)
CALCIUM SERPL-MCNC: 9.1 MG/DL — SIGNIFICANT CHANGE UP (ref 8.5–10.1)
CHLORIDE SERPL-SCNC: 107 MMOL/L — SIGNIFICANT CHANGE UP (ref 96–108)
CO2 SERPL-SCNC: 23 MMOL/L — SIGNIFICANT CHANGE UP (ref 22–31)
CREAT SERPL-MCNC: 2.04 MG/DL — HIGH (ref 0.5–1.3)
EGFR: 34 ML/MIN/1.73M2 — LOW
GLUCOSE BLDC GLUCOMTR-MCNC: 120 MG/DL — HIGH (ref 70–99)
GLUCOSE BLDC GLUCOMTR-MCNC: 132 MG/DL — HIGH (ref 70–99)
GLUCOSE BLDC GLUCOMTR-MCNC: 133 MG/DL — HIGH (ref 70–99)
GLUCOSE BLDC GLUCOMTR-MCNC: 97 MG/DL — SIGNIFICANT CHANGE UP (ref 70–99)
GLUCOSE SERPL-MCNC: 102 MG/DL — HIGH (ref 70–99)
HCT VFR BLD CALC: 39.6 % — SIGNIFICANT CHANGE UP (ref 39–50)
HGB BLD-MCNC: 13.6 G/DL — SIGNIFICANT CHANGE UP (ref 13–17)
MAGNESIUM SERPL-MCNC: 1.9 MG/DL — SIGNIFICANT CHANGE UP (ref 1.6–2.6)
MCHC RBC-ENTMCNC: 28.5 PG — SIGNIFICANT CHANGE UP (ref 27–34)
MCHC RBC-ENTMCNC: 34.3 G/DL — SIGNIFICANT CHANGE UP (ref 32–36)
MCV RBC AUTO: 82.8 FL — SIGNIFICANT CHANGE UP (ref 80–100)
NRBC # BLD: 0 /100 WBCS — SIGNIFICANT CHANGE UP (ref 0–0)
PHOSPHATE SERPL-MCNC: 3.1 MG/DL — SIGNIFICANT CHANGE UP (ref 2.5–4.5)
PLATELET # BLD AUTO: 306 K/UL — SIGNIFICANT CHANGE UP (ref 150–400)
POTASSIUM SERPL-MCNC: 4.4 MMOL/L — SIGNIFICANT CHANGE UP (ref 3.5–5.3)
POTASSIUM SERPL-SCNC: 4.4 MMOL/L — SIGNIFICANT CHANGE UP (ref 3.5–5.3)
RBC # BLD: 4.78 M/UL — SIGNIFICANT CHANGE UP (ref 4.2–5.8)
RBC # FLD: 14 % — SIGNIFICANT CHANGE UP (ref 10.3–14.5)
SODIUM SERPL-SCNC: 137 MMOL/L — SIGNIFICANT CHANGE UP (ref 135–145)
WBC # BLD: 9.02 K/UL — SIGNIFICANT CHANGE UP (ref 3.8–10.5)
WBC # FLD AUTO: 9.02 K/UL — SIGNIFICANT CHANGE UP (ref 3.8–10.5)

## 2024-05-23 PROCEDURE — 99232 SBSQ HOSP IP/OBS MODERATE 35: CPT | Mod: 24

## 2024-05-23 PROCEDURE — 99232 SBSQ HOSP IP/OBS MODERATE 35: CPT

## 2024-05-23 RX ADMIN — Medication 100 MILLIGRAM(S): at 05:45

## 2024-05-23 RX ADMIN — HEPARIN SODIUM 5000 UNIT(S): 5000 INJECTION INTRAVENOUS; SUBCUTANEOUS at 05:44

## 2024-05-23 RX ADMIN — Medication 100 MILLIGRAM(S): at 13:41

## 2024-05-23 RX ADMIN — ERTAPENEM SODIUM 120 MILLIGRAM(S): 1 INJECTION, POWDER, LYOPHILIZED, FOR SOLUTION INTRAMUSCULAR; INTRAVENOUS at 17:33

## 2024-05-23 RX ADMIN — HEPARIN SODIUM 5000 UNIT(S): 5000 INJECTION INTRAVENOUS; SUBCUTANEOUS at 13:41

## 2024-05-23 RX ADMIN — HEPARIN SODIUM 5000 UNIT(S): 5000 INJECTION INTRAVENOUS; SUBCUTANEOUS at 22:06

## 2024-05-23 RX ADMIN — Medication 100 MILLIGRAM(S): at 22:05

## 2024-05-23 RX ADMIN — TAMSULOSIN HYDROCHLORIDE 0.8 MILLIGRAM(S): 0.4 CAPSULE ORAL at 22:05

## 2024-05-23 RX ADMIN — AMLODIPINE BESYLATE 5 MILLIGRAM(S): 2.5 TABLET ORAL at 05:45

## 2024-05-23 RX ADMIN — FINASTERIDE 5 MILLIGRAM(S): 5 TABLET, FILM COATED ORAL at 11:46

## 2024-05-23 NOTE — PROGRESS NOTE ADULT - NS ATTEND AMEND GEN_ALL_CORE FT
I agree with the statements above
I agree with the statements above  Patient waiting for cardiac / med clearance before D/G
Pt had fever 103F, without hydronephrosis and positive blood culture following simple cystoscopy, indicating, Acute prostatitis.  Will continue antibiotics for 3 weeks.   CT scan reveals residual renal stone upper and lower poles without hydronephrosis.   Case was discussed with Dr Patterson as well and he concurs.
Agree with above and pt understands the rationale for holding off while here.  Examined and feeling much improved since starting abx.

## 2024-05-23 NOTE — PROGRESS NOTE ADULT - SUBJECTIVE AND OBJECTIVE BOX
[facility] creole  [id number]  Patient seen and examined bedside resting comfortably.  No complaints offered. denies pain, n/v, fevers chills       T(F): 97 (05-23-24 @ 05:19), Max: 98.8 (05-22-24 @ 11:20)  HR: 64 (05-23-24 @ 05:19) (64 - 75)  BP: 142/81 (05-23-24 @ 05:19) (120/69 - 155/77)  RR: 18 (05-23-24 @ 05:19) (16 - 18)  SpO2: 97% (05-23-24 @ 05:19) (96% - 98%)  Wt(kg): --  CAPILLARY BLOOD GLUCOSE      POCT Blood Glucose.: 97 mg/dL (23 May 2024 07:39)  POCT Blood Glucose.: 141 mg/dL (22 May 2024 21:35)  POCT Blood Glucose.: 111 mg/dL (22 May 2024 16:07)  POCT Blood Glucose.: 144 mg/dL (22 May 2024 11:03)      PHYSICAL EXAM:  General: NAD  Neuro:  Alert & oriented x 3  HEENT: NCAT, EOMI, conjunctiva clear  Lung:respirations nonlabored, good inspiratory effort  Abdomen: soft, NTND.   no suprapubic tenderness       LABS:                        13.6   9.02  )-----------( 306      ( 23 May 2024 08:32 )             39.6     05-22    138  |  108  |  34<H>  ----------------------------<  146<H>  4.4   |  21<L>  |  1.96<H>    Ca    8.6      22 May 2024 06:55          I&O:     Culture Results:   No growth at 72 Hours (05-19 @ 07:42)  Culture Results:   No growth at 72 Hours (05-19 @ 07:41)  Culture Results:   >100,000 CFU/ml Escherichia coli ESBL  50,000 - 99,000 CFU/mL Enterobacter cloacae complex *!* (05-17 @ 14:04)  Culture Results:   Growth in anaerobic bottle: Escherichia coli  See previous culture [id number] *!* (05-17 @ 13:10)  Culture Results:   Growth in aerobic and anaerobic bottles: Escherichia coli ESBL  Direct identification is available within approximately 3-5  hours either by Blood Panel Multiplexed PCR or Direct  MALDI-TOF. Details: [email or web address] *!* (05-17 @ 13:00)      73M with BPH s/p Outpatient Cystoscopy with Dr Patterson on 5/17, h/o left staghorn kidney stone, involving the lower calices, treated with PCNL on 3/6/24, now removed prior to OP cystoscopy, now admitted with ESBL ecoli bacteremia 2/2 UTI/acute prostatitis    rpt Bcx 5/19 negative.  Afebrile, w/o leukocytosis    -c/w flomax and finasteride   -d/c planning with Iv antibiotics per ID, with outpt f/u with Dr patterson      [facility] creole  [id number]  Patient seen and examined bedside resting comfortably.  No complaints offered. denies pain, n/v, fevers chills       T(F): 97 (05-23-24 @ 05:19), Max: 98.8 (05-22-24 @ 11:20)  HR: 64 (05-23-24 @ 05:19) (64 - 75)  BP: 142/81 (05-23-24 @ 05:19) (120/69 - 155/77)  RR: 18 (05-23-24 @ 05:19) (16 - 18)  SpO2: 97% (05-23-24 @ 05:19) (96% - 98%)  Wt(kg): --  CAPILLARY BLOOD GLUCOSE      POCT Blood Glucose.: 97 mg/dL (23 May 2024 07:39)  POCT Blood Glucose.: 141 mg/dL (22 May 2024 21:35)  POCT Blood Glucose.: 111 mg/dL (22 May 2024 16:07)  POCT Blood Glucose.: 144 mg/dL (22 May 2024 11:03)      PHYSICAL EXAM:  General: NAD  Neuro:  Alert & oriented x 3  HEENT: NCAT, EOMI, conjunctiva clear  Lung:respirations nonlabored, good inspiratory effort  Abdomen: soft, NTND.   no suprapubic tenderness       LABS:                        13.6   9.02  )-----------( 306      ( 23 May 2024 08:32 )             39.6     05-22    138  |  108  |  34<H>  ----------------------------<  146<H>  4.4   |  21<L>  |  1.96<H>    Ca    8.6      22 May 2024 06:55          I&O:     Culture Results:   No growth at 72 Hours (05-19 @ 07:42)  Culture Results:   No growth at 72 Hours (05-19 @ 07:41)  Culture Results:   >100,000 CFU/ml Escherichia coli ESBL  50,000 - 99,000 CFU/mL Enterobacter cloacae complex *!* (05-17 @ 14:04)  Culture Results:   Growth in anaerobic bottle: Escherichia coli  See previous culture [id number] *!* (05-17 @ 13:10)  Culture Results:   Growth in aerobic and anaerobic bottles: Escherichia coli ESBL  Direct identification is available within approximately 3-5  hours either by Blood Panel Multiplexed PCR or Direct  MALDI-TOF. Details: [email or web address] *!* (05-17 @ 13:00)      73M with BPH s/p Outpatient Cystoscopy with Dr Patterson on 5/17, h/o left staghorn kidney stone, involving the lower calices, treated with PCNL on 3/6/24, now removed prior to OP cystoscopy, now admitted with ESBL ecoli bacteremia 2/2 UTI/acute prostatitis    rpt Bcx 5/19 negative.  Afebrile, w/o leukocytosis    -c/w flomax and finasteride   -planning for eventual surgical intervention for prostate, please obtain medical and cardiac clearance   -d/c planning with Iv antibiotics per ID, with outpt f/u with Dr patterson      [facility] creole  [id number]  Patient seen and examined bedside resting comfortably.  No complaints offered. denies pain, n/v, fevers chills       T(F): 97 (05-23-24 @ 05:19), Max: 98.8 (05-22-24 @ 11:20)  HR: 64 (05-23-24 @ 05:19) (64 - 75)  BP: 142/81 (05-23-24 @ 05:19) (120/69 - 155/77)  RR: 18 (05-23-24 @ 05:19) (16 - 18)  SpO2: 97% (05-23-24 @ 05:19) (96% - 98%)  Wt(kg): --  CAPILLARY BLOOD GLUCOSE      POCT Blood Glucose.: 97 mg/dL (23 May 2024 07:39)  POCT Blood Glucose.: 141 mg/dL (22 May 2024 21:35)  POCT Blood Glucose.: 111 mg/dL (22 May 2024 16:07)  POCT Blood Glucose.: 144 mg/dL (22 May 2024 11:03)      PHYSICAL EXAM:  General: NAD  Neuro:  Alert & oriented x 3  HEENT: NCAT, EOMI, conjunctiva clear  Lung:respirations nonlabored, good inspiratory effort  Abdomen: soft, NTND.   no suprapubic tenderness       LABS:                        13.6   9.02  )-----------( 306      ( 23 May 2024 08:32 )             39.6     05-22    138  |  108  |  34<H>  ----------------------------<  146<H>  4.4   |  21<L>  |  1.96<H>    Ca    8.6      22 May 2024 06:55          I&O:     Culture Results:   No growth at 72 Hours (05-19 @ 07:42)  Culture Results:   No growth at 72 Hours (05-19 @ 07:41)  Culture Results:   >100,000 CFU/ml Escherichia coli ESBL  50,000 - 99,000 CFU/mL Enterobacter cloacae complex *!* (05-17 @ 14:04)  Culture Results:   Growth in anaerobic bottle: Escherichia coli  See previous culture [id number] *!* (05-17 @ 13:10)  Culture Results:   Growth in aerobic and anaerobic bottles: Escherichia coli ESBL  Direct identification is available within approximately 3-5  hours either by Blood Panel Multiplexed PCR or Direct  MALDI-TOF. Details: [email or web address] *!* (05-17 @ 13:00)      73M with BPH s/p Outpatient Cystoscopy with Dr Patterson on 5/17, h/o left staghorn kidney stone, involving the lower calices, treated with PCNL on 3/6/24, now removed prior to OP cystoscopy, now admitted with ESBL ecoli bacteremia 2/2 UTI/acute prostatitis    rpt Bcx 5/19 negative.  PVR 0  Afebrile, w/o leukocytosis    -c/w flomax and finasteride   -planning for eventual surgical intervention for prostate, please obtain medical and cardiac clearance   -d/c planning with Iv antibiotics per ID, with outpt f/u with Dr patterson

## 2024-05-23 NOTE — CONSULT NOTE ADULT - SUBJECTIVE AND OBJECTIVE BOX
Reason for Admission: urosepsis  History of Present Illness:   73 yr old male who speaks [place] Creole  hx of Benign Hypertension, Diabetes Mellitus Type2 , recent perc nephrolithotomy s/p L PCN (removed), hx of esbl, staghorn calculus.   This morning he had cystoscopy done outpatient with Dr. Patterson.  Was doing well initially but after returning home began having fever, chills and feeling unwell. Also with dark colored urine.  Denies complaints otherwise.  [place] well prior to this.Home Medications:   * Patient Currently Takes Medications as of 11-Mar-2024 11:59 documented in Structured Notes  · 	amLODIPine 5 mg oral tablet: 1 tab(s) orally once a day  · 	lidocaine 2% topical gel with applicator: 1 Apply topically to affected area every 3 hours As needed penile pain  · 	labetalol 100 mg oral tablet: 1 tab(s) orally 3 times a day  · 	Percocet 5 mg-325 mg oral tablet: 1 tab(s) orally every 4 hours as needed for  severe pain MDD: 4 tablets  · 	senna leaf extract oral tablet: 2 tab(s) orally once a day (at bedtime) As needed Constipation  · 	metFORMIN 500 mg oral tablet: 1 tab(s) orally once a day  Physical Exam: Vital Signs Last 24 Hrs  T(C): 37.8 (17 May 2024 14:33), Max: 39.5 (17 May 2024 12:42)  T(F): 100.1 (17 May 2024 14:33), Max: 103.1 (17 May 2024 12:42)  HR: 101 (17 May 2024 14:33) (67 - 109)  BP: 159/99 (17 May 2024 14:33) (96/65 - 161/85)  BP(mean): --  RR: 20 (17 May 2024 14:33) (18 - 26)  SpO2: 97% (17 May 2024 14:33) (95% - 100%)    Parameters below as of 17 May 2024 14:33  Patient On (Oxygen Delivery Method): room air    GENERAL: NAD, well-groomed, well-developed  HEAD:  Atraumatic, Normocephalic  EYES: EOMI, PERRLA, conjunctiva and sclera clear  ENMT: No tonsillar erythema, exudates, or enlargement; Moist mucous membranes, Good dentition, No lesions  NECK: Supple, No JVD, Normal thyroid  NERVOUS SYSTEM:  Alert & Oriented X3, Good concentration; Motor Strength 5/5 B/L upper and lower extremities; DTRs 2+ intact and symmetric  CHEST/LUNG: Clear to percussion bilaterally; No rales, rhonchi, wheezing, or rubs  HEART: Regular rate and rhythm; No murmurs, rubs, or gallops  ABDOMEN: Soft, Nontender, Nondistended; Bowel sounds present  EXTREMITIES:  2+ Peripheral Pulses, No clubbing, cyanosis, or edema  LYMPH: No lymphadenopathy   SKIN: No rashes or lesions  Labs and Results:  Labs, Radiology, Cardiology, and Other Results: 13.3   11.41 )-----------( 225      ( 17 May 2024 13:15 )             38.6     05    136  |  107  |  33<H>  ----------------------------<  170<H>  4.9   |  21<L>  |  2.48<H>    Ca    8.3<L>      17 May 2024 13:15    TPro  7.1  /  Alb  2.8<L>  /  TBili  0.7  /  DBili  x   /  AST  26  /  ALT  23  /  AlkPhos  57      PT/INR - ( 17 May 2024 13:15 )   PT: 11.7 sec;   INR: 0.97 ratio         PTT - ( 17 May 2024 13:15 )  PTT:28.6 sec  Urinalysis Basic - ( 17 May 2024 14:04 )    Color: Red / Appearance: Turbid / S.017 / pH: x  Gluc: x / Ketone: Negative mg/dL  / Bili: Small / Urobili: 1.0 mg/dL   Blood: x / Protein: >=1000 mg/dL / Nitrite: Negative   Leuk Esterase: Large / RBC: Too Numerous to count /HPF / WBC Too Numerous to count /HPF   Sq Epi: x / Non Sq Epi: x / Bacteria: Few /HPF

## 2024-05-23 NOTE — PROGRESS NOTE ADULT - ASSESSMENT
73 yr old male who speaks [place] Creole  hx of Benign Hypertension, Diabetes Mellitus Type2 , recent perc nephrolithotomy s/p L PCN (removed), hx of esbl, staghorn calculus.   This morning he had cystoscopy done outpatient with Dr. Patterson.  Was doing well initially but after returning home began having fever, chills and feeling unwell. Also with dark colored urine.  Denies complaints otherwise.  Felt well prior to this.  plan-  continue with ertapenem for the ESBBL e.coli bacteremia. day #3 , day #7 of total abx.  as per med records was on meropenem for 3 days.  advise total of 10 days OF IV abx for the ESBL   Cardiology cleareance for TURP done planning for outpatient but may  due in patient if scheduled for the week of May 27th   hence 4 more days of IV ertapenem.  rest of the management as per medicine and  teams.    Urosepsis s/p cystoscopy, gram neg bacteremia ESBL  -c/w ertapenem   -urology consult on board: may plan eventual prostate procedure as outpatient, after completing full course of antibiotics for prostatitis. As outpatient   KHANH cox appreciated     ED  -trial of ivf , improving     HTN  -home meds    DM  -iss

## 2024-05-23 NOTE — CONSULT NOTE ADULT - ASSESSMENT
73 M Nephrolithiasis BPH urosepsis  RCRI 2-3 (TURP, DM, CRF Cr 2.04) no active CAD HF Stroke TIA  Low-mderate periop AMCE risk  TTE pending  No med change required preop, no prohibitive CV contraindications to planned surgery  OPD ischemia investigation warranted (age gender HTN DM)  suggest rationalize meds postop to losartan 25/d and metoprolol 25/d.

## 2024-05-23 NOTE — PROGRESS NOTE ADULT - SUBJECTIVE AND OBJECTIVE BOX
HPI:  73 yr old male who speaks [place] Creole  hx of Benign Hypertension, Diabetes Mellitus Type2 , recent perc nephrolithotomy s/p L PCN (removed), hx of esbl, staghorn calculus.   This morning he had cystoscopy done outpatient with Dr. Patterson.  Was doing well initially but after returning home began having fever, chills and feeling unwell. Also with dark colored urine.  Denies complaints otherwise.  Felt well prior to this. (17 May 2024 15:38)  Patient is a 73y old  Male who presents with a chief complaint of urosepsis (23 May 2024 11:33)      INTERVAL HPI/OVERNIGHT EVENTS:    MEDICATIONS  (STANDING):  amLODIPine   Tablet 5 milliGRAM(s) Oral daily  dextrose 10% Bolus 125 milliLiter(s) IV Bolus once  dextrose 5%. 1000 milliLiter(s) (50 mL/Hr) IV Continuous <Continuous>  dextrose 50% Injectable 25 Gram(s) IV Push once  ertapenem  IVPB 1000 milliGRAM(s) IV Intermittent every 24 hours  finasteride 5 milliGRAM(s) Oral daily  glucagon  Injectable 1 milliGRAM(s) IntraMuscular once  heparin   Injectable 5000 Unit(s) SubCutaneous every 8 hours  insulin lispro (ADMELOG) corrective regimen sliding scale   SubCutaneous three times a day before meals  labetalol 100 milliGRAM(s) Oral three times a day  sodium chloride 0.9%. 1000 milliLiter(s) (75 mL/Hr) IV Continuous <Continuous>  tamsulosin 0.8 milliGRAM(s) Oral at bedtime    MEDICATIONS  (PRN):  acetaminophen     Tablet .. 650 milliGRAM(s) Oral every 6 hours PRN Temp greater or equal to 38C (100.4F), Mild Pain (1 - 3)  aluminum hydroxide/magnesium hydroxide/simethicone Suspension 30 milliLiter(s) Oral every 4 hours PRN Dyspepsia  dextrose Oral Gel 15 Gram(s) Oral once PRN Blood Glucose LESS THAN 70 milliGRAM(s)/deciliter  melatonin 3 milliGRAM(s) Oral at bedtime PRN Insomnia  ondansetron Injectable 4 milliGRAM(s) IV Push every 8 hours PRN Nausea and/or Vomiting  senna 2 Tablet(s) Oral at bedtime PRN Constipation      Allergies    No Known Allergies    Intolerances        REVIEW OF SYSTEMS:  CONSTITUTIONAL: No fever, weight loss, or fatigue  EYES: No eye pain, visual disturbances, or discharge  ENMT:  No difficulty hearing, tinnitus, vertigo; No sinus or throat pain  NECK: No pain or stiffness  BREASTS: No pain, masses, or nipple discharge  RESPIRATORY: No cough, wheezing, chills or hemoptysis; No shortness of breath  CARDIOVASCULAR: No chest pain, palpitations, dizziness, or leg swelling  GASTROINTESTINAL: No abdominal or epigastric pain. No nausea, vomiting, or hematemesis; No diarrhea or constipation. No melena or hematochezia.  GENITOURINARY: No dysuria, frequency, hematuria, or incontinence  NEUROLOGICAL: No headaches, memory loss, loss of strength, numbness, or tremors  SKIN: No itching, burning, rashes, or lesions   LYMPH NODES: No enlarged glands  ENDOCRINE: No heat or cold intolerance; No hair loss  MUSCULOSKELETAL: No joint pain or swelling; No muscle, back, or extremity pain  PSYCHIATRIC: No depression, anxiety, mood swings, or difficulty sleeping  HEME/LYMPH: No easy bruising, or bleeding gums  ALLERGY AND IMMUNOLOGIC: No hives or eczema    Vital Signs Last 24 Hrs  T(C): 36.7 (24 May 2024 05:14), Max: 36.8 (23 May 2024 10:45)  T(F): 98 (24 May 2024 05:14), Max: 98.3 (23 May 2024 17:37)  HR: 68 (24 May 2024 05:14) (68 - 77)  BP: 174/99 (24 May 2024 05:14) (138/79 - 174/99)  BP(mean): --  RR: 17 (24 May 2024 05:14) (17 - 19)  SpO2: 100% (24 May 2024 05:14) (94% - 100%)    Parameters below as of 23 May 2024 17:37  Patient On (Oxygen Delivery Method): room air        PHYSICAL EXAM:  GENERAL: NAD, well-groomed, well-developed  HEAD:  Atraumatic, Normocephalic  EYES: EOMI, PERRLA, conjunctiva and sclera clear  ENMT: No tonsillar erythema, exudates, or enlargement; Moist mucous membranes, Good dentition, No lesions  NECK: Supple, No JVD, Normal thyroid  NERVOUS SYSTEM:  Alert & Oriented X3, Good concentration; Motor Strength 5/5 B/L upper and lower extremities; DTRs 2+ intact and symmetric  CHEST/LUNG: Clear to ascultation  bilaterally; No rales, rhonchi, wheezing, or rubs  HEART: Regular rate and rhythm; No murmurs, rubs, or gallops  ABDOMEN: Soft, Nontender, Nondistended; Bowel sounds present  EXTREMITIES:  2+ Peripheral Pulses, No clubbing, cyanosis, or edema  LYMPH: No lymphadenopathy noted  SKIN: No rashes or lesions    LABS:                        13.6   9.02  )-----------( 306      ( 23 May 2024 08:32 )             39.6     05-23    137  |  107  |  35<H>  ----------------------------<  102<H>  4.4   |  23  |  2.04<H>    Ca    9.1      23 May 2024 08:32  Phos  3.1     05-23  Mg     1.9     05-23        Urinalysis Basic - ( 23 May 2024 08:32 )    Color: x / Appearance: x / SG: x / pH: x  Gluc: 102 mg/dL / Ketone: x  / Bili: x / Urobili: x   Blood: x / Protein: x / Nitrite: x   Leuk Esterase: x / RBC: x / WBC x   Sq Epi: x / Non Sq Epi: x / Bacteria: x      CAPILLARY BLOOD GLUCOSE      POCT Blood Glucose.: 106 mg/dL (24 May 2024 07:44)  POCT Blood Glucose.: 133 mg/dL (23 May 2024 21:31)  POCT Blood Glucose.: 120 mg/dL (23 May 2024 16:23)  POCT Blood Glucose.: 132 mg/dL (23 May 2024 10:56)      RADIOLOGY & ADDITIONAL TESTS:    Imaging Personally Reviewed:  [ ] YES  [ ] NO    Consultant(s) Notes Reviewed:  [ ] YES  [ ] NO    Care Discussed with Consultants/Other Providers [ ] YES  [ ] NO

## 2024-05-24 ENCOUNTER — RESULT REVIEW (OUTPATIENT)
Age: 74
End: 2024-05-24

## 2024-05-24 LAB
ANION GAP SERPL CALC-SCNC: 5 MMOL/L — SIGNIFICANT CHANGE UP (ref 5–17)
BUN SERPL-MCNC: 35 MG/DL — HIGH (ref 7–23)
CALCIUM SERPL-MCNC: 8.5 MG/DL — SIGNIFICANT CHANGE UP (ref 8.5–10.1)
CHLORIDE SERPL-SCNC: 110 MMOL/L — HIGH (ref 96–108)
CO2 SERPL-SCNC: 24 MMOL/L — SIGNIFICANT CHANGE UP (ref 22–31)
CREAT SERPL-MCNC: 1.93 MG/DL — HIGH (ref 0.5–1.3)
CULTURE RESULTS: SIGNIFICANT CHANGE UP
CULTURE RESULTS: SIGNIFICANT CHANGE UP
EGFR: 36 ML/MIN/1.73M2 — LOW
GLUCOSE BLDC GLUCOMTR-MCNC: 106 MG/DL — HIGH (ref 70–99)
GLUCOSE BLDC GLUCOMTR-MCNC: 108 MG/DL — HIGH (ref 70–99)
GLUCOSE BLDC GLUCOMTR-MCNC: 123 MG/DL — HIGH (ref 70–99)
GLUCOSE BLDC GLUCOMTR-MCNC: 151 MG/DL — HIGH (ref 70–99)
GLUCOSE SERPL-MCNC: 130 MG/DL — HIGH (ref 70–99)
HCT VFR BLD CALC: 39.5 % — SIGNIFICANT CHANGE UP (ref 39–50)
HGB BLD-MCNC: 13.3 G/DL — SIGNIFICANT CHANGE UP (ref 13–17)
MAGNESIUM SERPL-MCNC: 1.8 MG/DL — SIGNIFICANT CHANGE UP (ref 1.6–2.6)
MCHC RBC-ENTMCNC: 27.9 PG — SIGNIFICANT CHANGE UP (ref 27–34)
MCHC RBC-ENTMCNC: 33.7 G/DL — SIGNIFICANT CHANGE UP (ref 32–36)
MCV RBC AUTO: 83 FL — SIGNIFICANT CHANGE UP (ref 80–100)
NRBC # BLD: 0 /100 WBCS — SIGNIFICANT CHANGE UP (ref 0–0)
PHOSPHATE SERPL-MCNC: 3 MG/DL — SIGNIFICANT CHANGE UP (ref 2.5–4.5)
PLATELET # BLD AUTO: 286 K/UL — SIGNIFICANT CHANGE UP (ref 150–400)
POTASSIUM SERPL-MCNC: 4.4 MMOL/L — SIGNIFICANT CHANGE UP (ref 3.5–5.3)
POTASSIUM SERPL-SCNC: 4.4 MMOL/L — SIGNIFICANT CHANGE UP (ref 3.5–5.3)
RBC # BLD: 4.76 M/UL — SIGNIFICANT CHANGE UP (ref 4.2–5.8)
RBC # FLD: 14.2 % — SIGNIFICANT CHANGE UP (ref 10.3–14.5)
SODIUM SERPL-SCNC: 139 MMOL/L — SIGNIFICANT CHANGE UP (ref 135–145)
SPECIMEN SOURCE: SIGNIFICANT CHANGE UP
SPECIMEN SOURCE: SIGNIFICANT CHANGE UP
WBC # BLD: 8.79 K/UL — SIGNIFICANT CHANGE UP (ref 3.8–10.5)
WBC # FLD AUTO: 8.79 K/UL — SIGNIFICANT CHANGE UP (ref 3.8–10.5)

## 2024-05-24 PROCEDURE — 99232 SBSQ HOSP IP/OBS MODERATE 35: CPT

## 2024-05-24 PROCEDURE — 93306 TTE W/DOPPLER COMPLETE: CPT | Mod: 26

## 2024-05-24 PROCEDURE — 99232 SBSQ HOSP IP/OBS MODERATE 35: CPT | Mod: 24

## 2024-05-24 RX ADMIN — TAMSULOSIN HYDROCHLORIDE 0.8 MILLIGRAM(S): 0.4 CAPSULE ORAL at 21:18

## 2024-05-24 RX ADMIN — Medication 100 MILLIGRAM(S): at 17:25

## 2024-05-24 RX ADMIN — ERTAPENEM SODIUM 120 MILLIGRAM(S): 1 INJECTION, POWDER, LYOPHILIZED, FOR SOLUTION INTRAMUSCULAR; INTRAVENOUS at 18:45

## 2024-05-24 RX ADMIN — HEPARIN SODIUM 5000 UNIT(S): 5000 INJECTION INTRAVENOUS; SUBCUTANEOUS at 06:04

## 2024-05-24 RX ADMIN — FINASTERIDE 5 MILLIGRAM(S): 5 TABLET, FILM COATED ORAL at 11:33

## 2024-05-24 RX ADMIN — Medication 2: at 18:04

## 2024-05-24 RX ADMIN — Medication 100 MILLIGRAM(S): at 06:04

## 2024-05-24 RX ADMIN — SODIUM CHLORIDE 75 MILLILITER(S): 9 INJECTION INTRAMUSCULAR; INTRAVENOUS; SUBCUTANEOUS at 06:11

## 2024-05-24 RX ADMIN — Medication 100 MILLIGRAM(S): at 21:18

## 2024-05-24 RX ADMIN — HEPARIN SODIUM 5000 UNIT(S): 5000 INJECTION INTRAVENOUS; SUBCUTANEOUS at 21:18

## 2024-05-24 RX ADMIN — AMLODIPINE BESYLATE 5 MILLIGRAM(S): 2.5 TABLET ORAL at 06:04

## 2024-05-24 RX ADMIN — HEPARIN SODIUM 5000 UNIT(S): 5000 INJECTION INTRAVENOUS; SUBCUTANEOUS at 17:25

## 2024-05-24 NOTE — PROGRESS NOTE ADULT - NSPROGADDITIONALINFOA_GEN_ALL_CORE
Attg.    Can safely sign off  Please reconsult as needed  Should see Dr. Patterson within a wk of discharge.

## 2024-05-24 NOTE — DIETITIAN INITIAL EVALUATION ADULT - NSICDXPASTMEDICALHX_GEN_ALL_CORE_FT
PAST MEDICAL HISTORY:  Arthrosis     D[place]ib[place]ian Creole  needed     HTN (hypertension)     Language barrier to communication     Poor compliance with medication     Poor historian     Renal stone     Type 2 diabetes mellitus

## 2024-05-24 NOTE — DIETITIAN INITIAL EVALUATION ADULT - PERTINENT LABORATORY DATA
05-23    137  |  107  |  35<H>  ----------------------------<  102<H>  4.4   |  23  |  2.04<H>    Ca    9.1      23 May 2024 08:32  Phos  3.1     05-23  Mg     1.9     05-23    POCT Blood Glucose.: 106 mg/dL (05-24-24 @ 07:44)  A1C with Estimated Average Glucose Result: 6.7 % (05-18-24 @ 06:20)  A1C with Estimated Average Glucose Result: 7.0 % (02-16-24 @ 16:33)  A1C with Estimated Average Glucose Result: 6.2 % (11-15-23 @ 13:20)

## 2024-05-24 NOTE — DIETITIAN INITIAL EVALUATION ADULT - OTHER INFO
Pt [place] Creole speaking; [employer]  services used (: Kaycee #[id number]).  Pt reports mostly good appetite and PO intake PTA. Eats mostly Ramakrishna foods at home; likes chicken, potatoes, rice, vegetables. Monitors sugar intake. Pt with T2DM; A1c=6.7%, which indicates good blood glucose control PTA. Takes Metformin to control BG levels at home.  PO intake varies during admission; % for documented meals as per flow sheets. Refused all nutrition supplements offered. Denies any chew/swallowing difficulty or any N/V/D/C.  Reports his  lbs., however has not weighed himself in a while. Most recent wt noted as 223 lbs; request current and daily weights to confirm accuracy.

## 2024-05-24 NOTE — PROGRESS NOTE ADULT - SUBJECTIVE AND OBJECTIVE BOX
creole  [id number]   Patient seen and examined bedside resting comfortably.  No complaints offered.   Voiding without difficulty.  Denies hematuria and dysuria.  Denies nausea vomitin, diarrhea, fevers, chills, abd pain.  Tolerating diet.      T(F): 98 (05-24-24 @ 05:14), Max: 98.3 (05-23-24 @ 17:37)  HR: 68 (05-24-24 @ 05:14) (68 - 77)  BP: 174/99 (05-24-24 @ 05:14) (138/79 - 174/99)  RR: 17 (05-24-24 @ 05:14) (17 - 19)  SpO2: 100% (05-24-24 @ 05:14) (94% - 100%)  Wt(kg): --  CAPILLARY BLOOD GLUCOSE      POCT Blood Glucose.: 106 mg/dL (24 May 2024 07:44)  POCT Blood Glucose.: 133 mg/dL (23 May 2024 21:31)  POCT Blood Glucose.: 120 mg/dL (23 May 2024 16:23)  POCT Blood Glucose.: 132 mg/dL (23 May 2024 10:56)      PHYSICAL EXAM:  General: NAD, alert and awake  HEENT: NCAT, EOMI, conjunctiva clear  Chest: nonlabored respirations, good inspiratory effort  Abdomen: soft, NTND.   Extremities: no pedal edema or calf tenderness noted   : no suprapubic tenderness.    LABS:                        13.6   9.02  )-----------( 306      ( 23 May 2024 08:32 )             39.6   05-23    137  |  107  |  35<H>  ----------------------------<  102<H>  4.4   |  23  |  2.04<H>    Ca    9.1      23 May 2024 08:32  Phos  3.1     05-23  Mg     1.9     05-23      I&O's Detail    23 May 2024 07:01  -  24 May 2024 07:00  --------------------------------------------------------  IN:    Oral Fluid: 300 mL    sodium chloride 0.9%: 900 mL  Total IN: 1200 mL    OUT:    Voided (mL): 1000 mL  Total OUT: 1000 mL    Total NET: 200 mL        73M with BPH s/p Outpatient Cystoscopy with Dr Patterson on 5/17, h/o left staghorn kidney stone, involving the lower calices, treated with PCNL on 3/6/24, now removed prior to OP cystoscopy, now admitted with ESBL ecoli bacteremia 2/2 UTI/acute prostatitis    rpt Bcx 5/19 negative.  PVR 0  Afebrile, w/o leukocytosis    -c/w flomax and finasteride   -planning for eventual surgical intervention for prostate, appreciate cardiac clearance, please document medical clearance prior to d/c  -d/c planning with Iv antibiotics per ID, with outpt f/u with Dr patterson    creole  [id number]   Patient seen and examined bedside resting comfortably.  No complaints offered.   Voiding without difficulty.  Denies hematuria and dysuria.  Denies nausea vomitin, diarrhea, fevers, chills, abd pain.  Tolerating diet.      T(F): 98 (05-24-24 @ 05:14), Max: 98.3 (05-23-24 @ 17:37)  HR: 68 (05-24-24 @ 05:14) (68 - 77)  BP: 174/99 (05-24-24 @ 05:14) (138/79 - 174/99)  RR: 17 (05-24-24 @ 05:14) (17 - 19)  SpO2: 100% (05-24-24 @ 05:14) (94% - 100%)  Wt(kg): --  CAPILLARY BLOOD GLUCOSE      POCT Blood Glucose.: 106 mg/dL (24 May 2024 07:44)  POCT Blood Glucose.: 133 mg/dL (23 May 2024 21:31)  POCT Blood Glucose.: 120 mg/dL (23 May 2024 16:23)  POCT Blood Glucose.: 132 mg/dL (23 May 2024 10:56)      PHYSICAL EXAM:  General: NAD, alert and awake  HEENT: NCAT, EOMI, conjunctiva clear  Chest: nonlabored respirations, good inspiratory effort  Abdomen: soft, NTND.   Extremities: no pedal edema or calf tenderness noted   : no suprapubic tenderness.    LABS:                        13.6   9.02  )-----------( 306      ( 23 May 2024 08:32 )             39.6   05-23    137  |  107  |  35<H>  ----------------------------<  102<H>  4.4   |  23  |  2.04<H>    Ca    9.1      23 May 2024 08:32  Phos  3.1     05-23  Mg     1.9     05-23      I&O's Detail    23 May 2024 07:01  -  24 May 2024 07:00  --------------------------------------------------------  IN:    Oral Fluid: 300 mL    sodium chloride 0.9%: 900 mL  Total IN: 1200 mL    OUT:    Voided (mL): 1000 mL  Total OUT: 1000 mL    Total NET: 200 mL        73M with BPH s/p Outpatient Cystoscopy with Dr Patterson on 5/17, h/o left staghorn kidney stone, involving the lower calices, treated with PCNL on 3/6/24, now removed prior to OP cystoscopy, now admitted with ESBL ecoli bacteremia 2/2 UTI/acute prostatitis. rpt Bcx 5/19 negative.    detailed discussion had with Pt and Dr Dawson via , PSA was 11.5, had an MRI which showed a PIRADS 2 lesion, plan to follow PSA q 6 months. urinary flow is normal with flomax with a PVR 0. no significant indication for TURP but pt will need eventual urological intervention for removal of staghorn L renal stone. pt understands plan, and all questions answered    pt is Afebrile, w/o leukocytosis    -c/w flomax and finasteride. please send rx  -d/c planning with Iv antibiotics per ID, with outpt f/u with Dr Patterson for elective urological intervention of L renal stone. appreciate cardiac clearance, please document medical clearance    creole  [id number]   Patient seen and examined bedside resting comfortably.  No complaints offered.   Voiding without difficulty.  Denies hematuria and dysuria.  Denies nausea vomitin, diarrhea, fevers, chills, abd pain.  Tolerating diet.      T(F): 98 (05-24-24 @ 05:14), Max: 98.3 (05-23-24 @ 17:37)  HR: 68 (05-24-24 @ 05:14) (68 - 77)  BP: 174/99 (05-24-24 @ 05:14) (138/79 - 174/99)  RR: 17 (05-24-24 @ 05:14) (17 - 19)  SpO2: 100% (05-24-24 @ 05:14) (94% - 100%)  Wt(kg): --  CAPILLARY BLOOD GLUCOSE      POCT Blood Glucose.: 106 mg/dL (24 May 2024 07:44)  POCT Blood Glucose.: 133 mg/dL (23 May 2024 21:31)  POCT Blood Glucose.: 120 mg/dL (23 May 2024 16:23)  POCT Blood Glucose.: 132 mg/dL (23 May 2024 10:56)      PHYSICAL EXAM:  General: NAD, alert and awake  HEENT: NCAT, EOMI, conjunctiva clear  Chest: nonlabored respirations, good inspiratory effort  Abdomen: soft, NTND.   Extremities: no pedal edema or calf tenderness noted   : no suprapubic tenderness.    LABS:                        13.6   9.02  )-----------( 306      ( 23 May 2024 08:32 )             39.6   05-23    137  |  107  |  35<H>  ----------------------------<  102<H>  4.4   |  23  |  2.04<H>    Ca    9.1      23 May 2024 08:32  Phos  3.1     05-23  Mg     1.9     05-23      I&O's Detail    23 May 2024 07:01  -  24 May 2024 07:00  --------------------------------------------------------  IN:    Oral Fluid: 300 mL    sodium chloride 0.9%: 900 mL  Total IN: 1200 mL    OUT:    Voided (mL): 1000 mL  Total OUT: 1000 mL    Total NET: 200 mL        73M with BPH s/p Outpatient Cystoscopy with Dr Patterson on 5/17, h/o left staghorn kidney stone, involving the lower calices, treated with PCNL on 3/6/24, now removed prior to OP cystoscopy, now admitted with ESBL ecoli bacteremia 2/2 UTI/acute prostatitis. rpt Bcx 5/19 negative.    detailed discussion had with Pt and Dr Dawson via , PSA was 11.5, had an MRI which showed a PIRADS 2 lesion, plan to follow PSA q 6 months. urinary flow is normal with flomax with a PVR 0. pt to follow up with Dr Patterson and have discussion with family regarding prostate. pt will need eventual urological intervention for removal of staghorn L renal stone. pt understands plan, and all questions answered    pt is Afebrile, w/o leukocytosis    -c/w flomax and finasteride. please send rx  -d/c planning with Iv antibiotics per ID, with outpt f/u with Dr Patterson for elective urological intervention of L renal stone. appreciate cardiac clearance, please document medical clearance

## 2024-05-24 NOTE — PROGRESS NOTE ADULT - ASSESSMENT
A/P-  A/P 72 y/o male with BPH S/P Outpatient Cystoscopy with Dr Patterson on 5/17, presented to ER with c/o Hematuria, Fever and chills (now resolved)  h/o left staghorn kidney stone, involving the lower calices, treated with PCNL on 3/6/24, now removed prior to OP cystoscopy.    ESBL e.coli bacteremia secondary to UTI    clinically much improved  afebrile  + leukocytosis on admission , has  resolved  Blood cx from 5/17- ESBL e.coli x 2  urine cx- >100,000 E.coli and 50,000 enterobacter  repeat blood cx 5/19/2024- NGTD x 2    plan-  continue with ertapenem for the ESBBL e.coli bacteremia. day #5 , day #8 of total abx.  as per med records was on meropenem for 3 days.  advise total of 10 days OF IV abx for the ESBL bacteremia.  hence 2 more days of IV ertapenem and then can d/c IV abx .  rest of the management as per medicine and  teams.  outpatient follow up by  as per  note.    All labs and imaging and chart notes reviewed.     All above discussed with patient and patient verbalizes full understanding of all above and agrees with above plan of care.      Vikki Yanez MD  Infectious Disease Attending     is covering the ID service this weekend  until tuesday 5/28 and if any questions he can be reached  either via teams or by calling [phone]

## 2024-05-24 NOTE — PROGRESS NOTE ADULT - SUBJECTIVE AND OBJECTIVE BOX
[facility]  INFECTIOUS DISEASES   [address] [place] [address]  Tel: [phone]     Fax: [phone]  ==============================================================================  MD Trace Arellano, DO Mary Porter, NP   ==============================================================================      PLACIDE, CLAUDE  MRN-[id number]  73y ([birth year])      Interval History:    ROS:    [ ] Unobtainable because:  [ ] All other systems negative except as noted    Constitutional: no fever, no chills  Head: no trauma  Eyes: no vision changes, no eye pain  ENT:  no sore throat, no rhinorrhea  Cardiovascular:  no chest pain, no palpitation  Respiratory:  no SOB, no cough  GI:  no abd pain, no vomiting, no diarrhea  urinary: no dysuria, no hematuria, no flank pain  musculoskeletal:  no joint pain, no joint swelling  skin:  no rash  neurology:  no headache, no seizure, no change in mental status  psych: no anxiety, no depression         Allergies  No Known Allergies        ANTIMICROBIALS:  ertapenem  IVPB 1000 every 24 hours        Physical Exam:  Vital Signs Last 24 Hrs  T(C): 36.7 (24 May 2024 05:14), Max: 36.8 (23 May 2024 17:37)  T(F): 98 (24 May 2024 05:14), Max: 98.3 (23 May 2024 17:37)  HR: 68 (24 May 2024 05:14) (68 - 77)  BP: 174/99 (24 May 2024 05:14) (138/79 - 174/99)  BP(mean): --  RR: 17 (24 May 2024 05:14) (17 - 19)  SpO2: 100% (24 May 2024 05:14) (94% - 100%)    Parameters below as of 23 May 2024 17:37  Patient On (Oxygen Delivery Method): room air        05-23-24 @ 07:01  -  05-24-24 @ 07:00  --------------------------------------------------------  IN: 1200 mL / OUT: 1000 mL / NET: 200 mL      General:    NAD,  non toxic  Head: atraumatic, normocephalic  Eye: normal sclera and conjunctiva  ENT:    no oral lesions, neck supple  Cardio:     regular S1, S2,  no murmur  Respiratory:    clear b/l,    no wheezing  abd:     soft,   BS +,   no tenderness  :   no CVAT,  no suprapubic tenderness,   no  ocampo  Musculoskeletal:   no joint swelling,   no edema  vascular: no central lines, +PIV   Skin:    no rash  Neurologic:     no focal deficit  psych: normal affect    WBC Count: 9.02 K/uL (05-23 @ 08:32)  WBC Count: 8.00 K/uL (05-22 @ 06:55)  WBC Count: 8.08 K/uL (05-20 @ 07:06)  WBC Count: 10.68 K/uL (05-19 @ 07:10)  WBC Count: 17.88 K/uL (05-18 @ 06:20)  WBC Count: 11.41 K/uL (05-17 @ 13:15)                            13.6   9.02  )-----------( 306      ( 23 May 2024 08:32 )             39.6       05-23    137  |  107  |  35<H>  ----------------------------<  102<H>  4.4   |  23  |  2.04<H>    Ca    9.1      23 May 2024 08:32  Phos  3.1     05-23  Mg     1.9     05-23        Urinalysis Basic - ( 23 May 2024 08:32 )    Color: x / Appearance: x / SG: x / pH: x  Gluc: 102 mg/dL / Ketone: x  / Bili: x / Urobili: x   Blood: x / Protein: x / Nitrite: x   Leuk Esterase: x / RBC: x / WBC x   Sq Epi: x / Non Sq Epi: x / Bacteria: x          Creatinine Trend: 2.04<--, 1.96<--, 2.02<--, 1.92<--, 2.18<--, 2.48<--      MICROBIOLOGY:  v  .Blood Blood  05-19-24   No growth at 4 days  --  --      .Blood Blood  05-19-24   No growth at 4 days  --  --      Clean Catch Clean Catch (Midstream)  05-17-24   >100,000 CFU/ml Escherichia coli ESBL  50,000 - 99,000 CFU/mL Enterobacter cloacae complex  --  Escherichia coli ESBL  Enterobacter cloacae complex      .Blood Blood-Peripheral  05-17-24   Growth in anaerobic bottle: Escherichia coli  See previous culture [id number]  --    Growth in anaerobic bottle: Gram Negative Rods      .Blood Blood-Peripheral  05-17-24   Growth in aerobic and anaerobic bottles: Escherichia coli ESBL  Direct identification is available within approximately 3-5  hours either by Blood Panel Multiplexed PCR or Direct  MALDI-TOF. Details: [email or web address]  --  Blood Culture PCR  Escherichia coli ESBL                                  RADIOLOGY:   [facility]  INFECTIOUS DISEASES   [address] [place] [address]  Tel: [phone]     Fax: [phone]  ==============================================================================  MD Trace Arellano, DO Mary Porter, NP   ==============================================================================      PLACIDE, CLAUDE  MRN-[id number]  73y ([birth year])      Interval History:  Patient seen and examined.    ROS-  denies any fever or chills, denies any cough or sob, denies any dysurea, denies any diarrhea, denies any abd. pain, denies any diarrhea  denies any chest pain.      Allergies  No Known Allergies        ANTIMICROBIALS:  ertapenem  IVPB 1000 every 24 hours        Physical Exam:  Vital Signs Last 24 Hrs  T(C): 36.7 (24 May 2024 05:14), Max: 36.8 (23 May 2024 17:37)  T(F): 98 (24 May 2024 05:14), Max: 98.3 (23 May 2024 17:37)  HR: 68 (24 May 2024 05:14) (68 - 77)  BP: 174/99 (24 May 2024 05:14) (138/79 - 174/99)  BP(mean): --  RR: 17 (24 May 2024 05:14) (17 - 19)  SpO2: 100% (24 May 2024 05:14) (94% - 100%)    Parameters below as of 23 May 2024 17:37  Patient On (Oxygen Delivery Method): room air        05-23-24 @ 07:01  -  05-24-24 @ 07:00  --------------------------------------------------------  IN: 1200 mL / OUT: 1000 mL / NET: 200 mL      General:    NAD, non toxic  Head: atraumatic, normocephalic  Eyes: normal sclera and conjunctiva  ENT:   no oropharyngeal lesions, no LAD, neck supple  Cardio:    regular S1,S2, no murmur  Respiratory:   clear to auscultation b/l, no wheezing  abd:   soft, BS +, not tender, no distention  :    no CVA tenderness, no suprapubic tenderness, no ocampo  Musculoskeletal : no joint swelling, no edema  Skin:    no rash  vascular:  normal pulses  Neurologic:     no focal deficits  psych: normal affect    WBC Count: 9.02 K/uL (05-23 @ 08:32)  WBC Count: 8.00 K/uL (05-22 @ 06:55)  WBC Count: 8.08 K/uL (05-20 @ 07:06)  WBC Count: 10.68 K/uL (05-19 @ 07:10)  WBC Count: 17.88 K/uL (05-18 @ 06:20)  WBC Count: 11.41 K/uL (05-17 @ 13:15)                            13.6   9.02  )-----------( 306      ( 23 May 2024 08:32 )             39.6       05-23    137  |  107  |  35<H>  ----------------------------<  102<H>  4.4   |  23  |  2.04<H>    Ca    9.1      23 May 2024 08:32  Phos  3.1     05-23  Mg     1.9     05-23        Urinalysis Basic - ( 23 May 2024 08:32 )    Color: x / Appearance: x / SG: x / pH: x  Gluc: 102 mg/dL / Ketone: x  / Bili: x / Urobili: x   Blood: x / Protein: x / Nitrite: x   Leuk Esterase: x / RBC: x / WBC x   Sq Epi: x / Non Sq Epi: x / Bacteria: x      Creatinine Trend: 2.04<--, 1.96<--, 2.02<--, 1.92<--, 2.18<--, 2.48<--      MICROBIOLOGY:    .Blood Blood  05-19-24   No growth at 4 days  --  --      .Blood Blood  05-19-24   No growth at 4 days  --  --      Clean Catch Clean Catch (Midstream)  05-17-24   >100,000 CFU/ml Escherichia coli ESBL  50,000 - 99,000 CFU/mL Enterobacter cloacae complex  --  Escherichia coli ESBL  Enterobacter cloacae complex      .Blood Blood-Peripheral  05-17-24   Growth in anaerobic bottle: Escherichia coli  See previous culture [id number]  --    Growth in anaerobic bottle: Gram Negative Rods      .Blood Blood-Peripheral  05-17-24   Growth in aerobic and anaerobic bottles: Escherichia coli ESBL  Direct identification is available within approximately 3-5  hours either by Blood Panel Multiplexed PCR or Direct  MALDI-TOF. Details: [email or web address]  --  Blood Culture PCR  Escherichia coli ESBL      RADIOLOGY:    < from: CT Abdomen and Pelvis No Cont (05.19.24 @ 17:39) >  IMPRESSION:  Air-fluid level in the bladder may reflect recent instrumentation,   infection or fistula. Possible punctate bladder calculus versus right   distal ureteral calculus. No hydronephrosis.    Nonobstructing left intrarenal calculi. Left nephroureteral catheter has   been removed.    Pancolonic diverticulosis without diverticulitis.    Enlarged prostate.      --- End of Report ---        GILLES WILLIAM MD; Attending Radiologist  This document has been electronically signed. May 19 2024  6:37PM    < end of copied text >

## 2024-05-24 NOTE — PROGRESS NOTE ADULT - ASSESSMENT
73 yr old male who speaks [place] Creole  hx of Benign Hypertension, Diabetes Mellitus Type2 , recent perc nephrolithotomy s/p L PCN (removed), hx of esbl, staghorn calculus.   This morning he had cystoscopy done outpatient with Dr. Patterson.  Was doing well initially but after returning home began having fever, chills and feeling unwell. Also with dark colored urine.  Denies complaints otherwise.  Felt well prior to this.  plan-  continue with ertapenem for the ESBBL e.coli bacteremia.  will complete antibiotics on 5/26 and will dc home will follow up with urology as outpatient       Urosepsis s/p cystoscopy, gram neg bacteremia ESBL  -c/w ertapenem   -urology consult on board: may plan eventual prostate procedure as outpatient, after completing full course of antibiotics for prostatitis. As outpatient   KHANH cox appreciated     ED  -trial of ivf , improving     HTN  -home meds    DM  -iss

## 2024-05-24 NOTE — DIETITIAN INITIAL EVALUATION ADULT - PERTINENT MEDS FT
MEDICATIONS  (STANDING):  amLODIPine   Tablet 5 milliGRAM(s) Oral daily  dextrose 10% Bolus 125 milliLiter(s) IV Bolus once  dextrose 5%. 1000 milliLiter(s) (50 mL/Hr) IV Continuous <Continuous>  dextrose 50% Injectable 25 Gram(s) IV Push once  ertapenem  IVPB 1000 milliGRAM(s) IV Intermittent every 24 hours  finasteride 5 milliGRAM(s) Oral daily  glucagon  Injectable 1 milliGRAM(s) IntraMuscular once  heparin   Injectable 5000 Unit(s) SubCutaneous every 8 hours  insulin lispro (ADMELOG) corrective regimen sliding scale   SubCutaneous three times a day before meals  labetalol 100 milliGRAM(s) Oral three times a day  sodium chloride 0.9%. 1000 milliLiter(s) (75 mL/Hr) IV Continuous <Continuous>  tamsulosin 0.8 milliGRAM(s) Oral at bedtime    MEDICATIONS  (PRN):  acetaminophen     Tablet .. 650 milliGRAM(s) Oral every 6 hours PRN Temp greater or equal to 38C (100.4F), Mild Pain (1 - 3)  aluminum hydroxide/magnesium hydroxide/simethicone Suspension 30 milliLiter(s) Oral every 4 hours PRN Dyspepsia  dextrose Oral Gel 15 Gram(s) Oral once PRN Blood Glucose LESS THAN 70 milliGRAM(s)/deciliter  melatonin 3 milliGRAM(s) Oral at bedtime PRN Insomnia  ondansetron Injectable 4 milliGRAM(s) IV Push every 8 hours PRN Nausea and/or Vomiting  senna 2 Tablet(s) Oral at bedtime PRN Constipation

## 2024-05-24 NOTE — PROGRESS NOTE ADULT - SUBJECTIVE AND OBJECTIVE BOX
HPI:  73 yr old male who speaks [place]i Creole  hx of Benign Hypertension, Diabetes Mellitus Type2 , recent perc nephrolithotomy s/p L PCN (removed), hx of esbl, staghorn calculus.   This morning he had cystoscopy done outpatient with Dr. Patterson.  Was doing well initially but after returning home began having fever, chills and feeling unwell. Also with dark colored urine.  Denies complaints otherwise.  Felt well prior to this. (17 May 2024 15:38)  Patient is a 73y old  Male who presents with a chief complaint of urosepsis (24 May 2024 10:45)      INTERVAL HPI/OVERNIGHT EVENTS: no acute events     MEDICATIONS  (STANDING):  amLODIPine   Tablet 5 milliGRAM(s) Oral daily  dextrose 10% Bolus 125 milliLiter(s) IV Bolus once  dextrose 5%. 1000 milliLiter(s) (50 mL/Hr) IV Continuous <Continuous>  dextrose 50% Injectable 25 Gram(s) IV Push once  ertapenem  IVPB 1000 milliGRAM(s) IV Intermittent every 24 hours  finasteride 5 milliGRAM(s) Oral daily  glucagon  Injectable 1 milliGRAM(s) IntraMuscular once  heparin   Injectable 5000 Unit(s) SubCutaneous every 8 hours  insulin lispro (ADMELOG) corrective regimen sliding scale   SubCutaneous three times a day before meals  labetalol 100 milliGRAM(s) Oral three times a day  sodium chloride 0.9%. 1000 milliLiter(s) (75 mL/Hr) IV Continuous <Continuous>  tamsulosin 0.8 milliGRAM(s) Oral at bedtime    MEDICATIONS  (PRN):  acetaminophen     Tablet .. 650 milliGRAM(s) Oral every 6 hours PRN Temp greater or equal to 38C (100.4F), Mild Pain (1 - 3)  aluminum hydroxide/magnesium hydroxide/simethicone Suspension 30 milliLiter(s) Oral every 4 hours PRN Dyspepsia  dextrose Oral Gel 15 Gram(s) Oral once PRN Blood Glucose LESS THAN 70 milliGRAM(s)/deciliter  melatonin 3 milliGRAM(s) Oral at bedtime PRN Insomnia  ondansetron Injectable 4 milliGRAM(s) IV Push every 8 hours PRN Nausea and/or Vomiting  senna 2 Tablet(s) Oral at bedtime PRN Constipation      Allergies    No Known Allergies    Intolerances        REVIEW OF SYSTEMS:  CONSTITUTIONAL: No fever, weight loss, or fatigue  EYES: No eye pain, visual disturbances, or discharge  ENMT:  No difficulty hearing, tinnitus, vertigo; No sinus or throat pain  NECK: No pain or stiffness  BREASTS: No pain, masses, or nipple discharge  RESPIRATORY: No cough, wheezing, chills or hemoptysis; No shortness of breath  CARDIOVASCULAR: No chest pain, palpitations, dizziness, or leg swelling  GASTROINTESTINAL: No abdominal or epigastric pain. No nausea, vomiting, or hematemesis; No diarrhea or constipation. No melena or hematochezia.  GENITOURINARY: No dysuria, frequency, hematuria, or incontinence  NEUROLOGICAL: No headaches, memory loss, loss of strength, numbness, or tremors  SKIN: No itching, burning, rashes, or lesions   LYMPH NODES: No enlarged glands  ENDOCRINE: No heat or cold intolerance; No hair loss  MUSCULOSKELETAL: No joint pain or swelling; No muscle, back, or extremity pain  PSYCHIATRIC: No depression, anxiety, mood swings, or difficulty sleeping  HEME/LYMPH: No easy bruising, or bleeding gums  ALLERGY AND IMMUNOLOGIC: No hives or eczema    Vital Signs Last 24 Hrs  T(C): 36.8 (24 May 2024 17:19), Max: 36.8 (24 May 2024 11:10)  T(F): 98.2 (24 May 2024 17:19), Max: 98.2 (24 May 2024 11:10)  HR: 71 (24 May 2024 21:16) (68 - 73)  BP: 167/96 (24 May 2024 21:16) (149/87 - 174/99)  RR: 18 (24 May 2024 17:19) (17 - 18)  SpO2: 98% (24 May 2024 17:19) (97% - 100%)    Parameters below as of 24 May 2024 17:19  Patient On (Oxygen Delivery Method): room air        PHYSICAL EXAM:  GENERAL: NAD,   HEAD:  Atraumatic, Normocephalic  EYES: EOMI, PERRLA, conjunctiva and sclera clear  ENMT: No tonsillar erythema, exudates, or enlargement; Moist mucous membranes, Good dentition, No lesions  NECK: Supple, No JVD, Normal thyroid  NERVOUS SYSTEM:  Alert & Oriented X3, Good concentration; Motor Strength 5/5 B/L upper and lower extremities; DTRs 2+ intact and symmetric  CHEST/LUNG: Clear to ascultation  bilaterally; No rales, rhonchi, wheezing, or rubs  HEART: Regular rate and rhythm; No murmurs, rubs, or gallops  ABDOMEN: Soft, Nontender, Nondistended; Bowel sounds present  EXTREMITIES:  2+ Peripheral Pulses, No clubbing, cyanosis, or edema  LYMPH: No lymphadenopathy noted  SKIN: No rashes or lesions    LABS:                        13.3   8.79  )-----------( 286      ( 24 May 2024 10:15 )             39.5     05-24    139  |  110<H>  |  35<H>  ----------------------------<  130<H>  4.4   |  24  |  1.93<H>    Ca    8.5      24 May 2024 10:15  Phos  3.0     05-24  Mg     1.8     05-24        Urinalysis Basic - ( 24 May 2024 10:15 )    Color: x / Appearance: x / SG: x / pH: x  Gluc: 130 mg/dL / Ketone: x  / Bili: x / Urobili: x   Blood: x / Protein: x / Nitrite: x   Leuk Esterase: x / RBC: x / WBC x   Sq Epi: x / Non Sq Epi: x / Bacteria: x      CAPILLARY BLOOD GLUCOSE      POCT Blood Glucose.: 123 mg/dL (24 May 2024 21:37)  POCT Blood Glucose.: 151 mg/dL (24 May 2024 17:07)  POCT Blood Glucose.: 108 mg/dL (24 May 2024 11:06)  POCT Blood Glucose.: 106 mg/dL (24 May 2024 07:44)      RADIOLOGY & ADDITIONAL TESTS:    Imaging Personally Reviewed:  [ ] YES  [ ] NO    Consultant(s) Notes Reviewed:  [ ] YES  [ ] NO    Care Discussed with Consultants/Other Providers [ ] YES  [ ] NO Pharmacy dosing service    Initial Pharmacokinetic Consult for Vancomycin Dosing     Worsening shock, acidosis, and leukocytosis. Currently on 5 pressors. s/p endoscopic therapy for GIB this morning. Now with oliguric ALEXEY.   Antibiotics being added for p Goal vanco trough level 15-20 ug/mL - not scheduled yet. 3. Zosyn adjusted to 4.5 grams IV every 12 hours for renal function. Pharmacy will continue to follow him. We appreciate the opportunity to assist in his care.     Ciara Dunbar, PharmD  11/2

## 2024-05-25 LAB
GLUCOSE BLDC GLUCOMTR-MCNC: 101 MG/DL — HIGH (ref 70–99)
GLUCOSE BLDC GLUCOMTR-MCNC: 105 MG/DL — HIGH (ref 70–99)
GLUCOSE BLDC GLUCOMTR-MCNC: 117 MG/DL — HIGH (ref 70–99)
GLUCOSE BLDC GLUCOMTR-MCNC: 122 MG/DL — HIGH (ref 70–99)

## 2024-05-25 PROCEDURE — 99232 SBSQ HOSP IP/OBS MODERATE 35: CPT

## 2024-05-25 RX ADMIN — HEPARIN SODIUM 5000 UNIT(S): 5000 INJECTION INTRAVENOUS; SUBCUTANEOUS at 05:32

## 2024-05-25 RX ADMIN — HEPARIN SODIUM 5000 UNIT(S): 5000 INJECTION INTRAVENOUS; SUBCUTANEOUS at 13:58

## 2024-05-25 RX ADMIN — ERTAPENEM SODIUM 120 MILLIGRAM(S): 1 INJECTION, POWDER, LYOPHILIZED, FOR SOLUTION INTRAMUSCULAR; INTRAVENOUS at 17:28

## 2024-05-25 RX ADMIN — AMLODIPINE BESYLATE 5 MILLIGRAM(S): 2.5 TABLET ORAL at 05:31

## 2024-05-25 RX ADMIN — SODIUM CHLORIDE 75 MILLILITER(S): 9 INJECTION INTRAMUSCULAR; INTRAVENOUS; SUBCUTANEOUS at 12:29

## 2024-05-25 RX ADMIN — HEPARIN SODIUM 5000 UNIT(S): 5000 INJECTION INTRAVENOUS; SUBCUTANEOUS at 22:21

## 2024-05-25 RX ADMIN — Medication 100 MILLIGRAM(S): at 22:19

## 2024-05-25 RX ADMIN — TAMSULOSIN HYDROCHLORIDE 0.8 MILLIGRAM(S): 0.4 CAPSULE ORAL at 22:19

## 2024-05-25 RX ADMIN — Medication 100 MILLIGRAM(S): at 05:32

## 2024-05-25 RX ADMIN — FINASTERIDE 5 MILLIGRAM(S): 5 TABLET, FILM COATED ORAL at 11:04

## 2024-05-25 RX ADMIN — Medication 100 MILLIGRAM(S): at 13:58

## 2024-05-25 NOTE — PROGRESS NOTE ADULT - ASSESSMENT
73 yr old male who speaks Filipino Creole  hx of Benign Hypertension, Diabetes Mellitus Type2 , recent perc nephrolithotomy s/p L PCN (removed), hx of esbl, staghorn calculus.   This morning he had cystoscopy done outpatient with Dr. Patterson.  Was doing well initially but after returning home began having fever, chills and feeling unwell. Also with dark colored urine.  Denies complaints otherwise.  Felt well prior to this.  plan-  continue with ertapenem for the ESBBL e.coli bacteremia.  will complete antibiotics on 5/26 and will dc home will follow up with urology as outpatient       Urosepsis s/p cystoscopy, gram neg bacteremia ESBL  -c/w ertapenem   -urology consult on board: may plan eventual prostate procedure as outpatient, after completing full course of antibiotics for prostatitis. As outpatient   KHANH cox appreciated     ED  -trial of ivf , improving     HTN  -home meds    DM  -iss

## 2024-05-25 NOTE — PROGRESS NOTE ADULT - SUBJECTIVE AND OBJECTIVE BOX
HPI:  73 yr old male who speaks Banglades[place] Creole  hx of Benign Hypertension, Diabetes Mellitus Type2 , recent perc nephrolithotomy s/p L PCN (removed), hx of esbl, staghorn calculus.   This morning he had cystoscopy done outpatient with Dr. Patterson.  Was doing well initially but after returning home began having fever, chills and feeling unwell. Also with dark colored urine.  Denies complaints otherwise.  Felt well prior to this. (17 May 2024 15:38)  Patient is a 73y old  Male who presents with a chief complaint of urosepsis (24 May 2024 10:45)      INTERVAL HPI/OVERNIGHT EVENTS: no acute events     MEDICATIONS  (STANDING):  amLODIPine   Tablet 5 milliGRAM(s) Oral daily  dextrose 10% Bolus 125 milliLiter(s) IV Bolus once  dextrose 5%. 1000 milliLiter(s) (50 mL/Hr) IV Continuous <Continuous>  dextrose 50% Injectable 25 Gram(s) IV Push once  ertapenem  IVPB 1000 milliGRAM(s) IV Intermittent every 24 hours  finasteride 5 milliGRAM(s) Oral daily  glucagon  Injectable 1 milliGRAM(s) IntraMuscular once  heparin   Injectable 5000 Unit(s) SubCutaneous every 8 hours  insulin lispro (ADMELOG) corrective regimen sliding scale   SubCutaneous three times a day before meals  labetalol 100 milliGRAM(s) Oral three times a day  sodium chloride 0.9%. 1000 milliLiter(s) (75 mL/Hr) IV Continuous <Continuous>  tamsulosin 0.8 milliGRAM(s) Oral at bedtime    MEDICATIONS  (PRN):  acetaminophen     Tablet .. 650 milliGRAM(s) Oral every 6 hours PRN Temp greater or equal to 38C (100.4F), Mild Pain (1 - 3)  aluminum hydroxide/magnesium hydroxide/simethicone Suspension 30 milliLiter(s) Oral every 4 hours PRN Dyspepsia  dextrose Oral Gel 15 Gram(s) Oral once PRN Blood Glucose LESS THAN 70 milliGRAM(s)/deciliter  melatonin 3 milliGRAM(s) Oral at bedtime PRN Insomnia  ondansetron Injectable 4 milliGRAM(s) IV Push every 8 hours PRN Nausea and/or Vomiting  senna 2 Tablet(s) Oral at bedtime PRN Constipation      Allergies    No Known Allergies    Intolerances        REVIEW OF SYSTEMS:  CONSTITUTIONAL: No fever, weight loss, or fatigue  EYES: No eye pain, visual disturbances, or discharge  ENMT:  No difficulty hearing, tinnitus, vertigo; No sinus or throat pain  NECK: No pain or stiffness  BREASTS: No pain, masses, or nipple discharge  RESPIRATORY: No cough, wheezing, chills or hemoptysis; No shortness of breath  CARDIOVASCULAR: No chest pain, palpitations, dizziness, or leg swelling  GASTROINTESTINAL: No abdominal or epigastric pain. No nausea, vomiting, or hematemesis; No diarrhea or constipation. No melena or hematochezia.  GENITOURINARY: No dysuria, frequency, hematuria, or incontinence  NEUROLOGICAL: No headaches, memory loss, loss of strength, numbness, or tremors  SKIN: No itching, burning, rashes, or lesions   LYMPH NODES: No enlarged glands  ENDOCRINE: No heat or cold intolerance; No hair loss  MUSCULOSKELETAL: No joint pain or swelling; No muscle, back, or extremity pain  PSYCHIATRIC: No depression, anxiety, mood swings, or difficulty sleeping  HEME/LYMPH: No easy bruising, or bleeding gums  ALLERGY AND IMMUNOLOGIC: No hives or eczema    Vital Signs Last 24 Hrs  T(C): 36.8 (24 May 2024 17:19), Max: 36.8 (24 May 2024 11:10)  T(F): 98.2 (24 May 2024 17:19), Max: 98.2 (24 May 2024 11:10)  HR: 71 (24 May 2024 21:16) (68 - 73)  BP: 167/96 (24 May 2024 21:16) (149/87 - 174/99)  RR: 18 (24 May 2024 17:19) (17 - 18)  SpO2: 98% (24 May 2024 17:19) (97% - 100%)    Parameters below as of 24 May 2024 17:19  Patient On (Oxygen Delivery Method): room air        PHYSICAL EXAM:  GENERAL: NAD,   HEAD:  Atraumatic, Normocephalic  EYES: EOMI, PERRLA, conjunctiva and sclera clear  ENMT: No tonsillar erythema, exudates, or enlargement; Moist mucous membranes, Good dentition, No lesions  NECK: Supple, No JVD, Normal thyroid  NERVOUS SYSTEM:  Alert & Oriented X3, Good concentration; Motor Strength 5/5 B/L upper and lower extremities; DTRs 2+ intact and symmetric  CHEST/LUNG: Clear to ascultation  bilaterally; No rales, rhonchi, wheezing, or rubs  HEART: Regular rate and rhythm; No murmurs, rubs, or gallops  ABDOMEN: Soft, Nontender, Nondistended; Bowel sounds present  EXTREMITIES:  2+ Peripheral Pulses, No clubbing, cyanosis, or edema  LYMPH: No lymphadenopathy noted  SKIN: No rashes or lesions    LABS:                        13.3   8.79  )-----------( 286      ( 24 May 2024 10:15 )             39.5     05-24    139  |  110<H>  |  35<H>  ----------------------------<  130<H>  4.4   |  24  |  1.93<H>    Ca    8.5      24 May 2024 10:15  Phos  3.0     05-24  Mg     1.8     05-24        Urinalysis Basic - ( 24 May 2024 10:15 )    Color: x / Appearance: x / SG: x / pH: x  Gluc: 130 mg/dL / Ketone: x  / Bili: x / Urobili: x   Blood: x / Protein: x / Nitrite: x   Leuk Esterase: x / RBC: x / WBC x   Sq Epi: x / Non Sq Epi: x / Bacteria: x      CAPILLARY BLOOD GLUCOSE      POCT Blood Glucose.: 123 mg/dL (24 May 2024 21:37)  POCT Blood Glucose.: 151 mg/dL (24 May 2024 17:07)  POCT Blood Glucose.: 108 mg/dL (24 May 2024 11:06)  POCT Blood Glucose.: 106 mg/dL (24 May 2024 07:44)      RADIOLOGY & ADDITIONAL TESTS:    Imaging Personally Reviewed:  [ ] YES  [ ] NO    Consultant(s) Notes Reviewed:  [ ] YES  [ ] NO    Care Discussed with Consultants/Other Providers [ ] YES  [ ] NO

## 2024-05-25 NOTE — PROGRESS NOTE ADULT - PROVIDER SPECIALTY LIST ADULT
Hospitalist
Urology
Urology
Hospitalist
Infectious Disease
Infectious Disease
Hospitalist
Hospitalist
Urology
Hospitalist
Hospitalist
Urology

## 2024-05-26 ENCOUNTER — TRANSCRIPTION ENCOUNTER (OUTPATIENT)
Age: 74
End: 2024-05-26

## 2024-05-26 VITALS
SYSTOLIC BLOOD PRESSURE: 120 MMHG | TEMPERATURE: 98 F | DIASTOLIC BLOOD PRESSURE: 78 MMHG | RESPIRATION RATE: 18 BRPM | OXYGEN SATURATION: 99 % | HEART RATE: 70 BPM

## 2024-05-26 LAB
GLUCOSE BLDC GLUCOMTR-MCNC: 100 MG/DL — HIGH (ref 70–99)
GLUCOSE BLDC GLUCOMTR-MCNC: 105 MG/DL — HIGH (ref 70–99)

## 2024-05-26 PROCEDURE — 99239 HOSP IP/OBS DSCHRG MGMT >30: CPT

## 2024-05-26 RX ORDER — FINASTERIDE 5 MG/1
1 TABLET, FILM COATED ORAL
Qty: 30 | Refills: 1
Start: 2024-05-26 | End: 2024-07-24

## 2024-05-26 RX ORDER — TAMSULOSIN HYDROCHLORIDE 0.4 MG/1
2 CAPSULE ORAL
Qty: 60 | Refills: 0
Start: 2024-05-26 | End: 2024-06-24

## 2024-05-26 RX ORDER — ERTAPENEM SODIUM 1 G/1
1000 INJECTION, POWDER, LYOPHILIZED, FOR SOLUTION INTRAMUSCULAR; INTRAVENOUS EVERY 24 HOURS
Refills: 0 | Status: DISCONTINUED | OUTPATIENT
Start: 2024-05-26 | End: 2024-05-26

## 2024-05-26 RX ADMIN — ERTAPENEM SODIUM 120 MILLIGRAM(S): 1 INJECTION, POWDER, LYOPHILIZED, FOR SOLUTION INTRAMUSCULAR; INTRAVENOUS at 11:03

## 2024-05-26 RX ADMIN — Medication 100 MILLIGRAM(S): at 05:29

## 2024-05-26 RX ADMIN — HEPARIN SODIUM 5000 UNIT(S): 5000 INJECTION INTRAVENOUS; SUBCUTANEOUS at 05:28

## 2024-05-26 RX ADMIN — FINASTERIDE 5 MILLIGRAM(S): 5 TABLET, FILM COATED ORAL at 11:07

## 2024-05-26 RX ADMIN — AMLODIPINE BESYLATE 5 MILLIGRAM(S): 2.5 TABLET ORAL at 05:29

## 2024-05-26 NOTE — DISCHARGE NOTE PROVIDER - HOSPITAL COURSE
HPI:  73 yr old male who speaks Maldivian Creole  hx of Benign Hypertension, Diabetes Mellitus Type2 , recent perc nephrolithotomy s/p L PCN (removed), hx of esbl, staghorn calculus.   This morning he had cystoscopy done outpatient with Dr. Patterson.  Was doing well initially but after returning home began having fever, chills and feeling unwell. Also with dark colored urine.  Denies complaints otherwise.  Felt well prior to this. (17 May 2024 15:38)  73 yr old male who speaks Maldivian Creole  hx of Benign Hypertension, Diabetes Mellitus Type2 , recent perc nephrolithotomy s/p L PCN (removed), hx of esbl, staghorn calculus.   This morning he had cystoscopy done outpatient with Dr. Patterson.  Was doing well initially but after returning home began having fever, chills and feeling unwell. Also with dark colored urine.  Denies complaints otherwise.  Felt well prior to this.  plan-  continue with ertapenem for the ESBBL e.coli bacteremia.  will complete antibiotics on 5/26 and will dc home will follow up with urology as outpatient       Urosepsis s/p cystoscopy, gram neg bacteremia ESBL  -c/w ertapenem   -urology consult on board: may plan eventual prostate procedure as outpatient, after completing full course of antibiotics for prostatitis. As outpatient   ID brooke appreciated     ED  -trial of ivf , improving     HTN  -home meds    DM  -iss    73M with BPH s/p Outpatient Cystoscopy with Dr Patterson on 5/17, h/o left staghorn kidney stone, involving the lower calices, treated with PCNL on 3/6/24, now removed prior to OP cystoscopy, now admitted with ESBL ecoli bacteremia 2/2 UTI/acute prostatitis. rpt Bcx 5/19 negative.    detailed discussion had with Pt and Dr Dawson via , PSA was 11.5, had an MRI which showed a PIRADS 2 lesion, plan to follow PSA q 6 months. urinary flow is normal with flomax with a PVR 0. pt to follow up with Dr Patterson and have discussion with family regarding prostate. pt will need eventual urological intervention for removal of staghorn L renal stone. pt understands plan, and all questions answered    pt is Afebrile, w/o leukocytosis    -c/w flomax and finasteride. please send rx  -d/c planning with Iv antibiotics per ID, with outpt f/u with Dr Patterson for elective urological intervention of L renal stone. appreciate cardiac clearance, please document medical cleara

## 2024-05-26 NOTE — DISCHARGE NOTE PROVIDER - NSDCFUSCHEDAPPT_GEN_ALL_CORE_FT
Tom Patterson  [facility] Physician [facility]  UROLOGY  Siddharth A  Scheduled Appointment: 06/12/2024

## 2024-05-26 NOTE — DISCHARGE NOTE PROVIDER - NSDCMRMEDTOKEN_GEN_ALL_CORE_FT
amLODIPine 5 mg oral tablet: 1 tab(s) orally once a day  finasteride 5 mg oral tablet: 1 tab(s) orally once a day  labetalol 100 mg oral tablet: 1 tab(s) orally 3 times a day  lidocaine 2% topical gel with applicator: 1 Apply topically to affected area every 3 hours As needed penile pain  metFORMIN 500 mg oral tablet: 1 tab(s) orally once a day  Percocet 5 mg-325 mg oral tablet: 1 tab(s) orally every 4 hours as needed for  severe pain MDD: 4 tablets  senna leaf extract oral tablet: 2 tab(s) orally once a day (at bedtime) As needed Constipation  tamsulosin 0.4 mg oral capsule: 2 cap(s) orally once a day (at bedtime)

## 2024-05-26 NOTE — DISCHARGE NOTE NURSING/CASE MANAGEMENT/SOCIAL WORK - PATIENT PORTAL LINK FT
You can access the FollowMyHealth Patient Portal offered by [facility] by registering at the following website: [email or web address]. By joining [facility]’s FollowMyHealth portal, you will also be able to view your health information using other applications (apps) compatible with our system.

## 2024-05-26 NOTE — DISCHARGE NOTE PROVIDER - CARE PROVIDER_API CALL
Tom Patterson  Urology  [address]  [place] [id number]  Phone: [phone]  Fax: [phone]  Follow Up Time:

## 2024-05-26 NOTE — DISCHARGE NOTE NURSING/CASE MANAGEMENT/SOCIAL WORK - NSDPACMPNY_GEN_ALL_CORE
PATIENT'S DAUGHTER CALLED REQUESTING REFILL SENT TO [facility].      TRAMADOL 7-15-19 # [id number] REFILL X 1    LAST VISIT: 10--31-19  NEXT VISIT: 2-3-2020  
Family

## 2024-05-26 NOTE — DISCHARGE NOTE NURSING/CASE MANAGEMENT/SOCIAL WORK - NSDCPEFALRISK_GEN_ALL_CORE
For information on Fall & Injury Prevention, visit: [email or web address] OR  [email or web address] OR  https://www.cdc.gov/steadi/patient.html

## 2024-05-26 NOTE — DISCHARGE NOTE PROVIDER - NSDCCPCAREPLAN_GEN_ALL_CORE_FT
PRINCIPAL DISCHARGE DIAGNOSIS  Diagnosis: Acute UTI  Assessment and Plan of Treatment: treated with ertapeneun      SECONDARY DISCHARGE DIAGNOSES  Diagnosis: Benign prostatic hyperplasia with elevated prostate specific antigen (PSA)  Assessment and Plan of Treatment: patient has been cleared by coardiology for postate surgery

## 2024-05-28 ENCOUNTER — TRANSCRIPTION ENCOUNTER (OUTPATIENT)
Age: 74
End: 2024-05-28

## 2024-05-30 DIAGNOSIS — Z16.12 EXTENDED SPECTRUM BETA LACTAMASE (ESBL) RESISTANCE: ICD-10-CM

## 2024-05-30 DIAGNOSIS — N39.0 URINARY TRACT INFECTION, SITE NOT SPECIFIED: ICD-10-CM

## 2024-05-30 DIAGNOSIS — B96.20 UNSPECIFIED ESCHERICHIA COLI [E. COLI] AS THE CAUSE OF DISEASES CLASSIFIED ELSEWHERE: ICD-10-CM

## 2024-05-30 DIAGNOSIS — Z79.84 LONG TERM (CURRENT) USE OF ORAL HYPOGLYCEMIC DRUGS: ICD-10-CM

## 2024-05-30 DIAGNOSIS — E11.9 TYPE 2 DIABETES MELLITUS WITHOUT COMPLICATIONS: ICD-10-CM

## 2024-05-30 DIAGNOSIS — R78.81 BACTEREMIA: ICD-10-CM

## 2024-05-30 DIAGNOSIS — N17.9 ACUTE KIDNEY FAILURE, UNSPECIFIED: ICD-10-CM

## 2024-05-30 DIAGNOSIS — I10 ESSENTIAL (PRIMARY) HYPERTENSION: ICD-10-CM

## 2024-05-30 DIAGNOSIS — N41.0 ACUTE PROSTATITIS: ICD-10-CM

## 2024-05-31 ENCOUNTER — OUTPATIENT (OUTPATIENT)
Dept: OUTPATIENT SERVICES | Facility: HOSPITAL | Age: 74
LOS: 1 days | Discharge: HOME | End: 2024-05-31
Payer: MEDICARE

## 2024-05-31 VITALS
SYSTOLIC BLOOD PRESSURE: 125 MMHG | HEART RATE: 86 BPM | WEIGHT: 227.96 LBS | RESPIRATION RATE: 16 BRPM | OXYGEN SATURATION: 100 % | TEMPERATURE: 98 F | HEIGHT: 65 IN | DIASTOLIC BLOOD PRESSURE: 63 MMHG

## 2024-05-31 DIAGNOSIS — Z98.890 OTHER SPECIFIED POSTPROCEDURAL STATES: Chronic | ICD-10-CM

## 2024-05-31 DIAGNOSIS — I10 ESSENTIAL (PRIMARY) HYPERTENSION: ICD-10-CM

## 2024-05-31 DIAGNOSIS — Z01.812 ENCOUNTER FOR PREPROCEDURAL LABORATORY EXAMINATION: ICD-10-CM

## 2024-05-31 DIAGNOSIS — N40.0 BENIGN PROSTATIC HYPERPLASIA WITHOUT LOWER URINARY TRACT SYMPTOMS: ICD-10-CM

## 2024-05-31 DIAGNOSIS — N20.0 CALCULUS OF KIDNEY: Chronic | ICD-10-CM

## 2024-05-31 DIAGNOSIS — E11.9 TYPE 2 DIABETES MELLITUS WITHOUT COMPLICATIONS: ICD-10-CM

## 2024-05-31 DIAGNOSIS — Z01.818 ENCOUNTER FOR OTHER PREPROCEDURAL EXAMINATION: ICD-10-CM

## 2024-05-31 LAB
ANION GAP SERPL CALC-SCNC: 6 MMOL/L — SIGNIFICANT CHANGE UP (ref 5–17)
APPEARANCE UR: CLEAR — SIGNIFICANT CHANGE UP
BILIRUB UR-MCNC: NEGATIVE — SIGNIFICANT CHANGE UP
BLD GP AB SCN SERPL QL: SIGNIFICANT CHANGE UP
BUN SERPL-MCNC: 41 MG/DL — HIGH (ref 7–23)
CALCIUM SERPL-MCNC: 8.6 MG/DL — SIGNIFICANT CHANGE UP (ref 8.5–10.1)
CHLORIDE SERPL-SCNC: 106 MMOL/L — SIGNIFICANT CHANGE UP (ref 96–108)
CO2 SERPL-SCNC: 24 MMOL/L — SIGNIFICANT CHANGE UP (ref 22–31)
COLOR SPEC: YELLOW — SIGNIFICANT CHANGE UP
COMMENT - URINE: SIGNIFICANT CHANGE UP
CREAT SERPL-MCNC: 2.67 MG/DL — HIGH (ref 0.5–1.3)
DIFF PNL FLD: ABNORMAL
EGFR: 24 ML/MIN/1.73M2 — LOW
EPI CELLS # UR: PRESENT
GLUCOSE SERPL-MCNC: 137 MG/DL — HIGH (ref 70–99)
GLUCOSE UR QL: NEGATIVE MG/DL — SIGNIFICANT CHANGE UP
HCT VFR BLD CALC: 38.6 % — LOW (ref 39–50)
HGB BLD-MCNC: 13.5 G/DL — SIGNIFICANT CHANGE UP (ref 13–17)
KETONES UR-MCNC: NEGATIVE MG/DL — SIGNIFICANT CHANGE UP
LEUKOCYTE ESTERASE UR-ACNC: ABNORMAL
MCHC RBC-ENTMCNC: 28.7 PG — SIGNIFICANT CHANGE UP (ref 27–34)
MCHC RBC-ENTMCNC: 35 G/DL — SIGNIFICANT CHANGE UP (ref 32–36)
MCV RBC AUTO: 82 FL — SIGNIFICANT CHANGE UP (ref 80–100)
NITRITE UR-MCNC: NEGATIVE — SIGNIFICANT CHANGE UP
NRBC # BLD: 0 /100 WBCS — SIGNIFICANT CHANGE UP (ref 0–0)
PH UR: 6 — SIGNIFICANT CHANGE UP (ref 5–8)
PLATELET # BLD AUTO: 328 K/UL — SIGNIFICANT CHANGE UP (ref 150–400)
POTASSIUM SERPL-MCNC: 5 MMOL/L — SIGNIFICANT CHANGE UP (ref 3.5–5.3)
POTASSIUM SERPL-SCNC: 5 MMOL/L — SIGNIFICANT CHANGE UP (ref 3.5–5.3)
PROT UR-MCNC: 300 MG/DL
RBC # BLD: 4.71 M/UL — SIGNIFICANT CHANGE UP (ref 4.2–5.8)
RBC # FLD: 14.1 % — SIGNIFICANT CHANGE UP (ref 10.3–14.5)
RBC CASTS # UR COMP ASSIST: 2 /HPF — SIGNIFICANT CHANGE UP (ref 0–4)
SODIUM SERPL-SCNC: 136 MMOL/L — SIGNIFICANT CHANGE UP (ref 135–145)
SP GR SPEC: 1.01 — SIGNIFICANT CHANGE UP (ref 1–1.03)
UROBILINOGEN FLD QL: 1 MG/DL — SIGNIFICANT CHANGE UP (ref 0.2–1)
WBC # BLD: 9.64 K/UL — SIGNIFICANT CHANGE UP (ref 3.8–10.5)
WBC # FLD AUTO: 9.64 K/UL — SIGNIFICANT CHANGE UP (ref 3.8–10.5)
WBC UR QL: 10 /HPF — HIGH (ref 0–5)

## 2024-05-31 PROCEDURE — 93010 ELECTROCARDIOGRAM REPORT: CPT

## 2024-05-31 NOTE — H&P PST ADULT - HISTORY OF PRESENT ILLNESS
73M PMH HTN, DM      73M PMH HTN, DM presents to PST for scheduled cytoscopy TURP on 6/12/24 with          pt and family poor historians

## 2024-05-31 NOTE — H&P PST ADULT - NSICDXPASTMEDICALHX_GEN_ALL_CORE_FT
PAST MEDICAL HISTORY:  Arthrosis     Malian Creole  needed     HTN (hypertension)     Language barrier to communication     Poor compliance with medication     Poor historian     Renal stone     Type 2 diabetes mellitus

## 2024-05-31 NOTE — H&P PST ADULT - ASSESSMENT
73M PMH HTN, DM presents to [facility] for scheduled cytoscopy TURP on 24 with Dr.Venizano CAPRINI SCORE    AGE RELATED RISK FACTORS                                                             [ ] Age 41-60 years                                            (1 Point)  [ x] Age: 61-74 years                                           (2 Points)                 [ ] Age= 75 years                                                (3 Points)             DISEASE RELATED RISK FACTORS                                                       [ ] Edema in the lower extremities                 (1 Point)                     [ ] Varicose veins                                               (1 Point)                                 [x ] BMI > 25 Kg/m2                                            (1 Point)                                  [ ] Serious infection (ie PNA)                            (1 Point)                     [ ] Lung disease ( COPD, Emphysema)            (1 Point)                                                                          [ ] Acute myocardial infarction                         (1 Point)                  [ ] Congestive heart failure (in the previous month)  (1 Point)         [ ] Inflammatory bowel disease                            (1 Point)                  [ ] Central venous access, PICC or Port               (2 points)       (within the last month)                                                                [ ] Stroke (in the previous month)                        (5 Points)    [ ] Previous or present malignancy                       (2 points)                                                                                                                                                         HEMATOLOGY RELATED FACTORS                                                         [ ] Prior episodes of VTE                                     (3 Points)                     [ ] Positive family history for VTE                      (3 Points)                  [ ] Prothrombin 25938 A                                     (3 Points)                     [ ] Factor V Leiden                                                (3 Points)                        [ ] Lupus anticoagulants                                      (3 Points)                                                           [ ] Anticardiolipin antibodies                              (3 Points)                                                       [ ] High homocysteine in the blood                   (3 Points)                                             [ ] Other congenital or acquired thrombophilia      (3 Points)                                                [ ] Heparin induced thrombocytopenia                  (3 Points)                                        MOBILITY RELATED FACTORS  [ ] Bed rest                                                         (1 Point)  [ ] Plaster cast                                                    (2 points)  [ ] Bed bound for more than 72 hours           (2 Points)    GENDER SPECIFIC FACTORS  [ ] Pregnancy or had a baby within the last month   (1 Point)  [ ] Post-partum < 6 weeks                                   (1 Point)  [ ] Hormonal therapy  or oral contraception   (1 Point)  [ ] History of pregnancy complications              (1 point)  [ ] Unexplained or recurrent              (1 Point)    OTHER RISK FACTORS                                           (1 Point)  [ ] BMI >40, smoking, diabetes requiring insulin, chemotherapy  blood transfusions and length of surgery over 2 hours    SURGERY RELATED RISK FACTORS  [ ]  Section within the last month     (1 Point)  [ ] Minor surgery                                                  (1 Point)  [ ] Arthroscopic surgery                                       (2 Points)  [x ] Planned major surgery lasting more            (2 Points)      than 45 minutes     [ ] Elective hip or knee joint replacement       (5 points)       surgery                                                TRAUMA RELATED RISK FACTORS  [ ] Fracture of the hip, pelvis, or leg                       (5 Points)  [ ] Spinal cord injury resulting in paralysis             (5 points)       (in the previous month)    [ ] Paralysis  (less than 1 month)                             (5 Points)  [ ] Multiple Trauma within 1 month                        (5 Points)    Total Score [     5   ]    Caprini Score 0-2: Low Risk, NO VTE prophylaxis required for most patients, encourage ambulation  Caprini Score 3-6: Moderate Risk , pharmacologic VTE prophylaxis is indicated for most patients (in the absence of contraindications)  Caprini Score Greater than or =7: High risk, pharmocologic VTE prophylaxis indicated for most patients (in the absence of contraindications)

## 2024-05-31 NOTE — H&P PST ADULT - NSANTHAGERD_ENT_A_CORE
[facility]  Progress Note    Sandi Mathew Patient Status:  Inpatient    1952 MRN [id number]   [facility] 2NE-A Attending [facility] Day # 1 PCP Donnie Snowden MD     Assessment:  1.   Persistent afib with RV 05/21/2020     Lab Results   Component Value Date    INR 1.17 (H) 05/21/2020     Lab Results   Component Value Date     05/22/2020    K 3.6 05/22/2020     05/22/2020    CO2 27.0 05/22/2020    BUN 11 05/22/2020    CREATSERUM 1.07 05/22/2020    G Yes

## 2024-05-31 NOTE — H&P PST ADULT - PROBLEM SELECTOR PLAN 1
Labs-CBC, BMP, ,T&S, EKG   M/C required    Preop Hibiclens x 1 day instructions reviewed and given.   Take routine meds DOS with small sips of water, avoid NSAIDs and OTC supplements  Anesthesiologist to review PST labs, EKG, required clearances, and optimization for surgery

## 2024-06-01 LAB
CULTURE RESULTS: SIGNIFICANT CHANGE UP
SPECIMEN SOURCE: SIGNIFICANT CHANGE UP

## 2024-06-12 NOTE — REASON FOR VISIT
[[employer] ] : provided by [employer]   [Interpreters_IDNumber] : [id number] [Interpreters_FullName] : [email or web address] [[email or web address]] : Diana

## 2024-06-12 NOTE — DIETITIAN INITIAL EVALUATION ADULT - ENTER TO (CAL/KG)
Exploration, vein 11-Jun-2024   Right neck exploration, significant clot found throughout cephalic vein, mechanical thrombectomy, tPA administration (non-systemic), revision of VC venous anastamosis  Right neck culture  SUZANNE x1 (from prior)  Possibly etiology of hypoxic resp failure , atelectasis  Peripherally oriented small tree-in-bud nodular opacities in the   dependent portions of the right upper lobe representing impacted distal   airways which may be due to mucus or endobronchial spread of infection.  Incentive spirometry, suction fervently ]  Repeat CXR : Marked elevation of the right hemidiaphragm secondary to atelectasis   noted on recent chest CT.  Continue suction and incentive spirometry   Started on Heparin gtt, was stopped sec to bleeding   Pulm called, f/w recs 30

## 2024-06-12 NOTE — HISTORY OF PRESENT ILLNESS
[FreeTextEntry1] : 74 y/o male with h/o left staghorn kidney stone, involving the lower calices, treated with PCNL on 3/6/24. The patient refers today weakness. Had nephU on the left side, the removed with no issues. Denies fever. Refers nocturia X5. On flomax for 3 years with poor results.  Renal US today: A 11 mm echogenic focus visualized in the lower pole of the left kidney with dilated lower pole calyces. The right kidney is unremarkable. Both kidneys are normal in size and echogenicity without solid masses visualized  PSA: 11.50

## 2024-06-12 NOTE — PHYSICAL EXAM
[General Appearance - Well Developed] : well developed [General Appearance - Well Nourished] : well nourished [Normal Appearance] : normal appearance [Well Groomed] : well groomed [General Appearance - In No Acute Distress] : no acute distress [Edema] : no peripheral edema [Respiration, Rhythm And Depth] : normal respiratory rhythm and effort [Exaggerated Use Of Accessory Muscles For Inspiration] : no accessory muscle use [Abdomen Soft] : soft [Abdomen Tenderness] : non-tender [Costovertebral Angle Tenderness] : no ~M costovertebral angle tenderness [Urethral Meatus] : meatus normal [Urinary Bladder Findings] : the bladder was normal on palpation [Scrotum] : the scrotum was normal [Testes Mass (___cm)] : there were no testicular masses [No Prostate Nodules] : no prostate nodules [Normal Station and Gait] : the gait and station were normal for the patient's age [] : no rash [No Focal Deficits] : no focal deficits [Oriented To Time, Place, And Person] : oriented to person, place, and time [Affect] : the affect was normal [Mood] : the mood was normal [Not Anxious] : not anxious [No Palpable Adenopathy] : no palpable adenopathy [FreeTextEntry1] : Kidney percussion test positive on the left, no pain reported on bi-manual palpation bilaterally, no pain on superficial and deep palpation on the iliac fossa bilaterally and on the ureteral points

## 2024-06-12 NOTE — ASSESSMENT
[FreeTextEntry1] : 72 y/o male with h/o left staghorn kidney stone, involving the lower calices, treated with PCNL on 3/6/24. The patient refers today weakness. Had nephU on the left side, the removed with no issues. Denies fever. Refers nocturia X5. On flomax for 3 years with poor results.  Renal US today: A 11 mm echogenic focus visualized in the lower pole of the left kidney with dilated lower pole calyces. The right kidney is unremarkable. Both kidneys are normal in size and echogenicity without solid masses visualized  PSA: 11.50  The prostatic urethra had an enlargement of the lateral and median prostatic lobes. Prostate volume measured on CT scan: 70cc  Possible different treatments are explained, including TURP, HOLEP and open simple prostatectomy. The patient agrees with the proposed TURP treatment, explanation provided with video summary. Possible complications are explained including bleeding, incontinence.  Patient booked for surgery

## 2024-06-19 ENCOUNTER — INPATIENT (INPATIENT)
Facility: HOSPITAL | Age: 74
LOS: 2 days | Discharge: HOME | End: 2024-06-22
Attending: GENERAL ACUTE CARE HOSPITAL | Admitting: GENERAL ACUTE CARE HOSPITAL
Payer: MEDICARE

## 2024-06-19 VITALS
RESPIRATION RATE: 18 BRPM | TEMPERATURE: 98 F | SYSTOLIC BLOOD PRESSURE: 119 MMHG | WEIGHT: 210.1 LBS | HEIGHT: 65 IN | HEART RATE: 80 BPM | DIASTOLIC BLOOD PRESSURE: 80 MMHG | OXYGEN SATURATION: 98 %

## 2024-06-19 DIAGNOSIS — I12.9 HYPERTENSIVE CHRONIC KIDNEY DISEASE WITH STAGE 1 THROUGH STAGE 4 CHRONIC KIDNEY DISEASE, OR UNSPECIFIED CHRONIC KIDNEY DISEASE: ICD-10-CM

## 2024-06-19 DIAGNOSIS — Z98.890 OTHER SPECIFIED POSTPROCEDURAL STATES: Chronic | ICD-10-CM

## 2024-06-19 DIAGNOSIS — I95.1 ORTHOSTATIC HYPOTENSION: ICD-10-CM

## 2024-06-19 DIAGNOSIS — Z87.442 PERSONAL HISTORY OF URINARY CALCULI: ICD-10-CM

## 2024-06-19 DIAGNOSIS — T44.6X5A ADVERSE EFFECT OF ALPHA-ADRENORECEPTOR ANTAGONISTS, INITIAL ENCOUNTER: ICD-10-CM

## 2024-06-19 DIAGNOSIS — Z79.84 LONG TERM (CURRENT) USE OF ORAL HYPOGLYCEMIC DRUGS: ICD-10-CM

## 2024-06-19 DIAGNOSIS — N18.4 CHRONIC KIDNEY DISEASE, STAGE 4 (SEVERE): ICD-10-CM

## 2024-06-19 DIAGNOSIS — Z79.899 OTHER LONG TERM (CURRENT) DRUG THERAPY: ICD-10-CM

## 2024-06-19 DIAGNOSIS — R09.89 OTHER SPECIFIED SYMPTOMS AND SIGNS INVOLVING THE CIRCULATORY AND RESPIRATORY SYSTEMS: ICD-10-CM

## 2024-06-19 DIAGNOSIS — N40.0 BENIGN PROSTATIC HYPERPLASIA WITHOUT LOWER URINARY TRACT SYMPTOMS: ICD-10-CM

## 2024-06-19 DIAGNOSIS — E86.0 DEHYDRATION: ICD-10-CM

## 2024-06-19 DIAGNOSIS — E11.22 TYPE 2 DIABETES MELLITUS WITH DIABETIC CHRONIC KIDNEY DISEASE: ICD-10-CM

## 2024-06-19 DIAGNOSIS — R97.20 ELEVATED PROSTATE SPECIFIC ANTIGEN [PSA]: ICD-10-CM

## 2024-06-19 DIAGNOSIS — T38.6X5A ADVERSE EFFECT OF ANTIGONADOTROPHINS, ANTIESTROGENS, ANTIANDROGENS, NOT ELSEWHERE CLASSIFIED, INITIAL ENCOUNTER: ICD-10-CM

## 2024-06-19 DIAGNOSIS — N20.0 CALCULUS OF KIDNEY: Chronic | ICD-10-CM

## 2024-06-19 DIAGNOSIS — R55 SYNCOPE AND COLLAPSE: ICD-10-CM

## 2024-06-19 DIAGNOSIS — E78.5 HYPERLIPIDEMIA, UNSPECIFIED: ICD-10-CM

## 2024-06-19 DIAGNOSIS — Z91.148 PATIENT'S OTHER NONCOMPLIANCE WITH MEDICATION REGIMEN FOR OTHER REASON: ICD-10-CM

## 2024-06-19 DIAGNOSIS — N17.9 ACUTE KIDNEY FAILURE, UNSPECIFIED: ICD-10-CM

## 2024-06-19 LAB
ALBUMIN SERPL ELPH-MCNC: 3.1 G/DL — LOW (ref 3.3–5)
ALP SERPL-CCNC: 51 U/L — SIGNIFICANT CHANGE UP (ref 40–120)
ALT FLD-CCNC: 22 U/L — SIGNIFICANT CHANGE UP (ref 12–78)
ANION GAP SERPL CALC-SCNC: 6 MMOL/L — SIGNIFICANT CHANGE UP (ref 5–17)
APPEARANCE UR: CLEAR — SIGNIFICANT CHANGE UP
APTT BLD: 26.7 SEC — SIGNIFICANT CHANGE UP (ref 24.5–35.6)
AST SERPL-CCNC: 32 U/L — SIGNIFICANT CHANGE UP (ref 15–37)
BACTERIA # UR AUTO: ABNORMAL /HPF
BASOPHILS # BLD AUTO: 0.03 K/UL — SIGNIFICANT CHANGE UP (ref 0–0.2)
BASOPHILS NFR BLD AUTO: 0.3 % — SIGNIFICANT CHANGE UP (ref 0–2)
BILIRUB SERPL-MCNC: 0.4 MG/DL — SIGNIFICANT CHANGE UP (ref 0.2–1.2)
BILIRUB UR-MCNC: NEGATIVE — SIGNIFICANT CHANGE UP
BUN SERPL-MCNC: 49 MG/DL — HIGH (ref 7–23)
CALCIUM SERPL-MCNC: 9 MG/DL — SIGNIFICANT CHANGE UP (ref 8.5–10.1)
CHLORIDE SERPL-SCNC: 104 MMOL/L — SIGNIFICANT CHANGE UP (ref 96–108)
CO2 SERPL-SCNC: 24 MMOL/L — SIGNIFICANT CHANGE UP (ref 22–31)
COLOR SPEC: YELLOW — SIGNIFICANT CHANGE UP
CREAT SERPL-MCNC: 2.89 MG/DL — HIGH (ref 0.5–1.3)
D DIMER BLD IA.RAPID-MCNC: 637 NG/ML DDU — HIGH
DIFF PNL FLD: ABNORMAL
EGFR: 22 ML/MIN/1.73M2 — LOW
EGFR: 22 ML/MIN/1.73M2 — LOW
EOSINOPHIL # BLD AUTO: 0.05 K/UL — SIGNIFICANT CHANGE UP (ref 0–0.5)
EOSINOPHIL NFR BLD AUTO: 0.6 % — SIGNIFICANT CHANGE UP (ref 0–6)
EPI CELLS # UR: PRESENT
GLUCOSE BLDC GLUCOMTR-MCNC: 227 MG/DL — HIGH (ref 70–99)
GLUCOSE SERPL-MCNC: 191 MG/DL — HIGH (ref 70–99)
GLUCOSE UR QL: NEGATIVE MG/DL — SIGNIFICANT CHANGE UP
HCT VFR BLD CALC: 40.9 % — SIGNIFICANT CHANGE UP (ref 39–50)
HGB BLD-MCNC: 13.9 G/DL — SIGNIFICANT CHANGE UP (ref 13–17)
IMM GRANULOCYTES NFR BLD AUTO: 0.6 % — SIGNIFICANT CHANGE UP (ref 0–0.9)
INR BLD: 0.92 RATIO — SIGNIFICANT CHANGE UP (ref 0.85–1.18)
KETONES UR-MCNC: NEGATIVE MG/DL — SIGNIFICANT CHANGE UP
LEUKOCYTE ESTERASE UR-ACNC: ABNORMAL
LYMPHOCYTES # BLD AUTO: 1.93 K/UL — SIGNIFICANT CHANGE UP (ref 1–3.3)
LYMPHOCYTES # BLD AUTO: 21.9 % — SIGNIFICANT CHANGE UP (ref 13–44)
MAGNESIUM SERPL-MCNC: 1.9 MG/DL — SIGNIFICANT CHANGE UP (ref 1.6–2.6)
MCHC RBC-ENTMCNC: 28.3 PG — SIGNIFICANT CHANGE UP (ref 27–34)
MCHC RBC-ENTMCNC: 34 G/DL — SIGNIFICANT CHANGE UP (ref 32–36)
MCV RBC AUTO: 83.1 FL — SIGNIFICANT CHANGE UP (ref 80–100)
MONOCYTES # BLD AUTO: 0.91 K/UL — HIGH (ref 0–0.9)
MONOCYTES NFR BLD AUTO: 10.3 % — SIGNIFICANT CHANGE UP (ref 2–14)
NEUTROPHILS # BLD AUTO: 5.83 K/UL — SIGNIFICANT CHANGE UP (ref 1.8–7.4)
NEUTROPHILS NFR BLD AUTO: 66.3 % — SIGNIFICANT CHANGE UP (ref 43–77)
NITRITE UR-MCNC: NEGATIVE — SIGNIFICANT CHANGE UP
NRBC # BLD: 0 /100 WBCS — SIGNIFICANT CHANGE UP (ref 0–0)
NRBC BLD-RTO: 0 /100 WBCS — SIGNIFICANT CHANGE UP (ref 0–0)
PH UR: 5.5 — SIGNIFICANT CHANGE UP (ref 5–8)
PHOSPHATE SERPL-MCNC: 3.3 MG/DL — SIGNIFICANT CHANGE UP (ref 2.5–4.5)
PLATELET # BLD AUTO: 270 K/UL — SIGNIFICANT CHANGE UP (ref 150–400)
POTASSIUM SERPL-MCNC: 5.2 MMOL/L — SIGNIFICANT CHANGE UP (ref 3.5–5.3)
POTASSIUM SERPL-SCNC: 5.2 MMOL/L — SIGNIFICANT CHANGE UP (ref 3.5–5.3)
PROT SERPL-MCNC: 8 GM/DL — SIGNIFICANT CHANGE UP (ref 6–8.3)
PROT UR-MCNC: 100 MG/DL
PROTHROM AB SERPL-ACNC: 11.1 SEC — SIGNIFICANT CHANGE UP (ref 9.5–13)
RBC # BLD: 4.92 M/UL — SIGNIFICANT CHANGE UP (ref 4.2–5.8)
RBC # FLD: 14.1 % — SIGNIFICANT CHANGE UP (ref 10.3–14.5)
RBC CASTS # UR COMP ASSIST: 3 /HPF — SIGNIFICANT CHANGE UP (ref 0–4)
SODIUM SERPL-SCNC: 134 MMOL/L — LOW (ref 135–145)
SP GR SPEC: 1.01 — SIGNIFICANT CHANGE UP (ref 1–1.03)
TROPONIN I, HIGH SENSITIVITY RESULT: 9.8 NG/L — SIGNIFICANT CHANGE UP
UROBILINOGEN FLD QL: 0.2 MG/DL — SIGNIFICANT CHANGE UP (ref 0.2–1)
WBC # BLD: 8.8 K/UL — SIGNIFICANT CHANGE UP (ref 3.8–10.5)
WBC # FLD AUTO: 8.8 K/UL — SIGNIFICANT CHANGE UP (ref 3.8–10.5)
WBC UR QL: 35 /HPF — HIGH (ref 0–5)

## 2024-06-19 PROCEDURE — 99223 1ST HOSP IP/OBS HIGH 75: CPT

## 2024-06-19 PROCEDURE — 99285 EMERGENCY DEPT VISIT HI MDM: CPT

## 2024-06-19 PROCEDURE — 93010 ELECTROCARDIOGRAM REPORT: CPT

## 2024-06-19 PROCEDURE — 93970 EXTREMITY STUDY: CPT | Mod: 26

## 2024-06-19 PROCEDURE — 76775 US EXAM ABDO BACK WALL LIM: CPT | Mod: 26

## 2024-06-19 RX ORDER — DEXTROSE 50 % IN WATER 50 %
25 SYRINGE (ML) INTRAVENOUS ONCE
Refills: 0 | Status: DISCONTINUED | OUTPATIENT
Start: 2024-06-19 | End: 2024-06-22

## 2024-06-19 RX ORDER — SENNA 187 MG
2 TABLET ORAL AT BEDTIME
Refills: 0 | Status: DISCONTINUED | OUTPATIENT
Start: 2024-06-19 | End: 2024-06-22

## 2024-06-19 RX ORDER — SODIUM CHLORIDE 9 G/1000ML
1000 INJECTION, SOLUTION INTRAVENOUS
Refills: 0 | Status: DISCONTINUED | OUTPATIENT
Start: 2024-06-19 | End: 2024-06-22

## 2024-06-19 RX ORDER — FINASTERIDE 1 MG/1
5 TABLET, FILM COATED ORAL DAILY
Refills: 0 | Status: DISCONTINUED | OUTPATIENT
Start: 2024-06-19 | End: 2024-06-22

## 2024-06-19 RX ORDER — INSULIN LISPRO 100 U/ML
INJECTION, SOLUTION INTRAVENOUS; SUBCUTANEOUS
Refills: 0 | Status: DISCONTINUED | OUTPATIENT
Start: 2024-06-19 | End: 2024-06-22

## 2024-06-19 RX ORDER — OXYCODONE HYDROCHLORIDE AND ACETAMINOPHEN 10; 325 MG/1; MG/1
1 TABLET ORAL EVERY 6 HOURS
Refills: 0 | Status: DISCONTINUED | OUTPATIENT
Start: 2024-06-19 | End: 2024-06-20

## 2024-06-19 RX ORDER — GLUCAGON 3 MG/1
1 POWDER NASAL ONCE
Refills: 0 | Status: DISCONTINUED | OUTPATIENT
Start: 2024-06-19 | End: 2024-06-22

## 2024-06-19 RX ORDER — DEXTROSE 50 % IN WATER 50 %
15 SYRINGE (ML) INTRAVENOUS ONCE
Refills: 0 | Status: DISCONTINUED | OUTPATIENT
Start: 2024-06-19 | End: 2024-06-22

## 2024-06-19 RX ORDER — ACETAMINOPHEN 500 MG/5ML
975 LIQUID (ML) ORAL EVERY 8 HOURS
Refills: 0 | Status: DISCONTINUED | OUTPATIENT
Start: 2024-06-19 | End: 2024-06-22

## 2024-06-19 RX ORDER — DEXTROSE MONOHYDRATE 100 G/1000ML
125 INJECTION, SOLUTION INTRAVENOUS ONCE
Refills: 0 | Status: DISCONTINUED | OUTPATIENT
Start: 2024-06-19 | End: 2024-06-22

## 2024-06-19 RX ORDER — HEPARIN SODIUM 1000 [USP'U]/ML
5000 INJECTION INTRAVENOUS; SUBCUTANEOUS EVERY 12 HOURS
Refills: 0 | Status: DISCONTINUED | OUTPATIENT
Start: 2024-06-19 | End: 2024-06-20

## 2024-06-19 RX ORDER — DEXTROSE 50 % IN WATER 50 %
12.5 SYRINGE (ML) INTRAVENOUS ONCE
Refills: 0 | Status: DISCONTINUED | OUTPATIENT
Start: 2024-06-19 | End: 2024-06-22

## 2024-06-19 RX ORDER — TAMSULOSIN HYDROCHLORIDE 0.4 MG/1
0.8 CAPSULE ORAL AT BEDTIME
Refills: 0 | Status: DISCONTINUED | OUTPATIENT
Start: 2024-06-19 | End: 2024-06-22

## 2024-06-19 RX ADMIN — TAMSULOSIN HYDROCHLORIDE 0.8 MILLIGRAM(S): 0.4 CAPSULE ORAL at 21:58

## 2024-06-19 RX ADMIN — INSULIN LISPRO 2: 100 INJECTION, SOLUTION INTRAVENOUS; SUBCUTANEOUS at 21:58

## 2024-06-19 RX ADMIN — HEPARIN SODIUM 5000 UNIT(S): 1000 INJECTION INTRAVENOUS; SUBCUTANEOUS at 18:24

## 2024-06-19 RX ADMIN — Medication 500 MILLILITER(S): at 14:29

## 2024-06-19 RX ADMIN — Medication 2 TABLET(S): at 21:58

## 2024-06-19 RX ADMIN — SODIUM CHLORIDE 75 MILLILITER(S): 9 INJECTION, SOLUTION INTRAVENOUS at 18:41

## 2024-06-20 LAB
A1C WITH ESTIMATED AVERAGE GLUCOSE RESULT: 6.9 % — HIGH (ref 4–5.6)
ANION GAP SERPL CALC-SCNC: 5 MMOL/L — SIGNIFICANT CHANGE UP (ref 5–17)
ANION GAP SERPL CALC-SCNC: 5 MMOL/L — SIGNIFICANT CHANGE UP (ref 5–17)
BUN SERPL-MCNC: 39 MG/DL — HIGH (ref 7–23)
BUN SERPL-MCNC: 40 MG/DL — HIGH (ref 7–23)
CALCIUM SERPL-MCNC: 8.3 MG/DL — LOW (ref 8.5–10.1)
CALCIUM SERPL-MCNC: 8.7 MG/DL — SIGNIFICANT CHANGE UP (ref 8.5–10.1)
CHLORIDE SERPL-SCNC: 106 MMOL/L — SIGNIFICANT CHANGE UP (ref 96–108)
CHLORIDE SERPL-SCNC: 107 MMOL/L — SIGNIFICANT CHANGE UP (ref 96–108)
CO2 SERPL-SCNC: 23 MMOL/L — SIGNIFICANT CHANGE UP (ref 22–31)
CO2 SERPL-SCNC: 24 MMOL/L — SIGNIFICANT CHANGE UP (ref 22–31)
CREAT SERPL-MCNC: 2.24 MG/DL — HIGH (ref 0.5–1.3)
CREAT SERPL-MCNC: 2.29 MG/DL — HIGH (ref 0.5–1.3)
EGFR: 29 ML/MIN/1.73M2 — LOW
EGFR: 29 ML/MIN/1.73M2 — LOW
EGFR: 30 ML/MIN/1.73M2 — LOW
EGFR: 30 ML/MIN/1.73M2 — LOW
ESTIMATED AVERAGE GLUCOSE: 151 MG/DL — HIGH (ref 68–114)
GLUCOSE BLDC GLUCOMTR-MCNC: 137 MG/DL — HIGH (ref 70–99)
GLUCOSE BLDC GLUCOMTR-MCNC: 148 MG/DL — HIGH (ref 70–99)
GLUCOSE BLDC GLUCOMTR-MCNC: 158 MG/DL — HIGH (ref 70–99)
GLUCOSE BLDC GLUCOMTR-MCNC: 195 MG/DL — HIGH (ref 70–99)
GLUCOSE SERPL-MCNC: 161 MG/DL — HIGH (ref 70–99)
GLUCOSE SERPL-MCNC: 202 MG/DL — HIGH (ref 70–99)
HCT VFR BLD CALC: 37.3 % — LOW (ref 39–50)
HGB BLD-MCNC: 12.7 G/DL — LOW (ref 13–17)
MCHC RBC-ENTMCNC: 28.2 PG — SIGNIFICANT CHANGE UP (ref 27–34)
MCHC RBC-ENTMCNC: 34 G/DL — SIGNIFICANT CHANGE UP (ref 32–36)
MCV RBC AUTO: 82.7 FL — SIGNIFICANT CHANGE UP (ref 80–100)
NRBC # BLD: 0 /100 WBCS — SIGNIFICANT CHANGE UP (ref 0–0)
NRBC BLD-RTO: 0 /100 WBCS — SIGNIFICANT CHANGE UP (ref 0–0)
PLATELET # BLD AUTO: 261 K/UL — SIGNIFICANT CHANGE UP (ref 150–400)
POTASSIUM SERPL-MCNC: 4.1 MMOL/L — SIGNIFICANT CHANGE UP (ref 3.5–5.3)
POTASSIUM SERPL-MCNC: 4.1 MMOL/L — SIGNIFICANT CHANGE UP (ref 3.5–5.3)
POTASSIUM SERPL-SCNC: 4.1 MMOL/L — SIGNIFICANT CHANGE UP (ref 3.5–5.3)
POTASSIUM SERPL-SCNC: 4.1 MMOL/L — SIGNIFICANT CHANGE UP (ref 3.5–5.3)
RBC # BLD: 4.51 M/UL — SIGNIFICANT CHANGE UP (ref 4.2–5.8)
RBC # FLD: 14 % — SIGNIFICANT CHANGE UP (ref 10.3–14.5)
SODIUM SERPL-SCNC: 135 MMOL/L — SIGNIFICANT CHANGE UP (ref 135–145)
SODIUM SERPL-SCNC: 135 MMOL/L — SIGNIFICANT CHANGE UP (ref 135–145)
WBC # BLD: 8.28 K/UL — SIGNIFICANT CHANGE UP (ref 3.8–10.5)
WBC # FLD AUTO: 8.28 K/UL — SIGNIFICANT CHANGE UP (ref 3.8–10.5)

## 2024-06-20 PROCEDURE — 99232 SBSQ HOSP IP/OBS MODERATE 35: CPT

## 2024-06-20 RX ORDER — HEPARIN SODIUM 1000 [USP'U]/ML
5000 INJECTION INTRAVENOUS; SUBCUTANEOUS EVERY 8 HOURS
Refills: 0 | Status: DISCONTINUED | OUTPATIENT
Start: 2024-06-20 | End: 2024-06-22

## 2024-06-20 RX ORDER — INSULIN GLARGINE-YFGN 100 [IU]/ML
10 INJECTION, SOLUTION SUBCUTANEOUS EVERY MORNING
Refills: 0 | Status: DISCONTINUED | OUTPATIENT
Start: 2024-06-20 | End: 2024-06-22

## 2024-06-20 RX ORDER — AMLODIPINE BESYLATE 10 MG/1
10 TABLET ORAL DAILY
Refills: 0 | Status: DISCONTINUED | OUTPATIENT
Start: 2024-06-20 | End: 2024-06-22

## 2024-06-20 RX ORDER — OXYCODONE HYDROCHLORIDE 30 MG/1
5 TABLET ORAL EVERY 6 HOURS
Refills: 0 | Status: DISCONTINUED | OUTPATIENT
Start: 2024-06-20 | End: 2024-06-20

## 2024-06-20 RX ADMIN — TAMSULOSIN HYDROCHLORIDE 0.8 MILLIGRAM(S): 0.4 CAPSULE ORAL at 21:27

## 2024-06-20 RX ADMIN — INSULIN LISPRO 1: 100 INJECTION, SOLUTION INTRAVENOUS; SUBCUTANEOUS at 17:19

## 2024-06-20 RX ADMIN — Medication 2 TABLET(S): at 21:27

## 2024-06-20 RX ADMIN — SODIUM CHLORIDE 75 MILLILITER(S): 9 INJECTION, SOLUTION INTRAVENOUS at 05:27

## 2024-06-20 RX ADMIN — FINASTERIDE 5 MILLIGRAM(S): 1 TABLET, FILM COATED ORAL at 12:32

## 2024-06-20 RX ADMIN — HEPARIN SODIUM 5000 UNIT(S): 1000 INJECTION INTRAVENOUS; SUBCUTANEOUS at 16:21

## 2024-06-20 RX ADMIN — INSULIN GLARGINE-YFGN 10 UNIT(S): 100 INJECTION, SOLUTION SUBCUTANEOUS at 08:37

## 2024-06-20 RX ADMIN — AMLODIPINE BESYLATE 10 MILLIGRAM(S): 10 TABLET ORAL at 05:27

## 2024-06-20 RX ADMIN — Medication 1000 MILLILITER(S): at 12:37

## 2024-06-20 RX ADMIN — HEPARIN SODIUM 5000 UNIT(S): 1000 INJECTION INTRAVENOUS; SUBCUTANEOUS at 05:27

## 2024-06-20 RX ADMIN — HEPARIN SODIUM 5000 UNIT(S): 1000 INJECTION INTRAVENOUS; SUBCUTANEOUS at 21:27

## 2024-06-21 LAB
ALBUMIN SERPL ELPH-MCNC: 2.9 G/DL — LOW (ref 3.3–5)
ALP SERPL-CCNC: 50 U/L — SIGNIFICANT CHANGE UP (ref 40–120)
ALT FLD-CCNC: 19 U/L — SIGNIFICANT CHANGE UP (ref 12–78)
ANION GAP SERPL CALC-SCNC: 7 MMOL/L — SIGNIFICANT CHANGE UP (ref 5–17)
AST SERPL-CCNC: 15 U/L — SIGNIFICANT CHANGE UP (ref 15–37)
BASOPHILS # BLD AUTO: 0.04 K/UL — SIGNIFICANT CHANGE UP (ref 0–0.2)
BASOPHILS NFR BLD AUTO: 0.6 % — SIGNIFICANT CHANGE UP (ref 0–2)
BILIRUB SERPL-MCNC: 0.3 MG/DL — SIGNIFICANT CHANGE UP (ref 0.2–1.2)
BUN SERPL-MCNC: 35 MG/DL — HIGH (ref 7–23)
CALCIUM SERPL-MCNC: 9 MG/DL — SIGNIFICANT CHANGE UP (ref 8.5–10.1)
CHLORIDE SERPL-SCNC: 107 MMOL/L — SIGNIFICANT CHANGE UP (ref 96–108)
CO2 SERPL-SCNC: 23 MMOL/L — SIGNIFICANT CHANGE UP (ref 22–31)
CREAT SERPL-MCNC: 2.39 MG/DL — HIGH (ref 0.5–1.3)
EGFR: 28 ML/MIN/1.73M2 — LOW
EGFR: 28 ML/MIN/1.73M2 — LOW
EOSINOPHIL # BLD AUTO: 0.14 K/UL — SIGNIFICANT CHANGE UP (ref 0–0.5)
EOSINOPHIL NFR BLD AUTO: 2.2 % — SIGNIFICANT CHANGE UP (ref 0–6)
GLUCOSE BLDC GLUCOMTR-MCNC: 117 MG/DL — HIGH (ref 70–99)
GLUCOSE BLDC GLUCOMTR-MCNC: 144 MG/DL — HIGH (ref 70–99)
GLUCOSE BLDC GLUCOMTR-MCNC: 159 MG/DL — HIGH (ref 70–99)
GLUCOSE BLDC GLUCOMTR-MCNC: 159 MG/DL — HIGH (ref 70–99)
GLUCOSE SERPL-MCNC: 150 MG/DL — HIGH (ref 70–99)
HCT VFR BLD CALC: 38.8 % — LOW (ref 39–50)
HGB BLD-MCNC: 13.1 G/DL — SIGNIFICANT CHANGE UP (ref 13–17)
IMM GRANULOCYTES NFR BLD AUTO: 0.3 % — SIGNIFICANT CHANGE UP (ref 0–0.9)
LYMPHOCYTES # BLD AUTO: 2.13 K/UL — SIGNIFICANT CHANGE UP (ref 1–3.3)
LYMPHOCYTES # BLD AUTO: 33.9 % — SIGNIFICANT CHANGE UP (ref 13–44)
MCHC RBC-ENTMCNC: 27.8 PG — SIGNIFICANT CHANGE UP (ref 27–34)
MCHC RBC-ENTMCNC: 33.8 G/DL — SIGNIFICANT CHANGE UP (ref 32–36)
MCV RBC AUTO: 82.4 FL — SIGNIFICANT CHANGE UP (ref 80–100)
MONOCYTES # BLD AUTO: 0.52 K/UL — SIGNIFICANT CHANGE UP (ref 0–0.9)
MONOCYTES NFR BLD AUTO: 8.3 % — SIGNIFICANT CHANGE UP (ref 2–14)
NEUTROPHILS # BLD AUTO: 3.44 K/UL — SIGNIFICANT CHANGE UP (ref 1.8–7.4)
NEUTROPHILS NFR BLD AUTO: 54.7 % — SIGNIFICANT CHANGE UP (ref 43–77)
NRBC # BLD: 0 /100 WBCS — SIGNIFICANT CHANGE UP (ref 0–0)
NRBC BLD-RTO: 0 /100 WBCS — SIGNIFICANT CHANGE UP (ref 0–0)
PLATELET # BLD AUTO: 250 K/UL — SIGNIFICANT CHANGE UP (ref 150–400)
POTASSIUM SERPL-MCNC: 4.4 MMOL/L — SIGNIFICANT CHANGE UP (ref 3.5–5.3)
POTASSIUM SERPL-SCNC: 4.4 MMOL/L — SIGNIFICANT CHANGE UP (ref 3.5–5.3)
PROT SERPL-MCNC: 7.4 GM/DL — SIGNIFICANT CHANGE UP (ref 6–8.3)
RBC # BLD: 4.71 M/UL — SIGNIFICANT CHANGE UP (ref 4.2–5.8)
RBC # FLD: 13.9 % — SIGNIFICANT CHANGE UP (ref 10.3–14.5)
SODIUM SERPL-SCNC: 137 MMOL/L — SIGNIFICANT CHANGE UP (ref 135–145)
WBC # BLD: 6.29 K/UL — SIGNIFICANT CHANGE UP (ref 3.8–10.5)
WBC # FLD AUTO: 6.29 K/UL — SIGNIFICANT CHANGE UP (ref 3.8–10.5)

## 2024-06-21 PROCEDURE — 99232 SBSQ HOSP IP/OBS MODERATE 35: CPT

## 2024-06-21 RX ORDER — TRAMADOL HYDROCHLORIDE 50 MG/1
50 TABLET, FILM COATED ORAL EVERY 8 HOURS
Refills: 0 | Status: DISCONTINUED | OUTPATIENT
Start: 2024-06-21 | End: 2024-06-22

## 2024-06-21 RX ADMIN — INSULIN GLARGINE-YFGN 10 UNIT(S): 100 INJECTION, SOLUTION SUBCUTANEOUS at 08:30

## 2024-06-21 RX ADMIN — TAMSULOSIN HYDROCHLORIDE 0.8 MILLIGRAM(S): 0.4 CAPSULE ORAL at 21:42

## 2024-06-21 RX ADMIN — HEPARIN SODIUM 5000 UNIT(S): 1000 INJECTION INTRAVENOUS; SUBCUTANEOUS at 06:18

## 2024-06-21 RX ADMIN — FINASTERIDE 5 MILLIGRAM(S): 1 TABLET, FILM COATED ORAL at 11:46

## 2024-06-21 RX ADMIN — Medication 2 TABLET(S): at 21:42

## 2024-06-21 RX ADMIN — INSULIN LISPRO 1: 100 INJECTION, SOLUTION INTRAVENOUS; SUBCUTANEOUS at 08:31

## 2024-06-21 RX ADMIN — HEPARIN SODIUM 5000 UNIT(S): 1000 INJECTION INTRAVENOUS; SUBCUTANEOUS at 14:43

## 2024-06-21 RX ADMIN — HEPARIN SODIUM 5000 UNIT(S): 1000 INJECTION INTRAVENOUS; SUBCUTANEOUS at 21:42

## 2024-06-21 RX ADMIN — AMLODIPINE BESYLATE 10 MILLIGRAM(S): 10 TABLET ORAL at 06:18

## 2024-06-22 ENCOUNTER — TRANSCRIPTION ENCOUNTER (OUTPATIENT)
Age: 74
End: 2024-06-22

## 2024-06-22 VITALS
HEART RATE: 80 BPM | RESPIRATION RATE: 18 BRPM | TEMPERATURE: 98 F | SYSTOLIC BLOOD PRESSURE: 128 MMHG | OXYGEN SATURATION: 96 % | DIASTOLIC BLOOD PRESSURE: 91 MMHG

## 2024-06-22 LAB
ANION GAP SERPL CALC-SCNC: 9 MMOL/L — SIGNIFICANT CHANGE UP (ref 5–17)
BUN SERPL-MCNC: 37 MG/DL — HIGH (ref 7–23)
CALCIUM SERPL-MCNC: 9.1 MG/DL — SIGNIFICANT CHANGE UP (ref 8.5–10.1)
CHLORIDE SERPL-SCNC: 106 MMOL/L — SIGNIFICANT CHANGE UP (ref 96–108)
CO2 SERPL-SCNC: 23 MMOL/L — SIGNIFICANT CHANGE UP (ref 22–31)
CREAT SERPL-MCNC: 2.3 MG/DL — HIGH (ref 0.5–1.3)
EGFR: 29 ML/MIN/1.73M2 — LOW
EGFR: 29 ML/MIN/1.73M2 — LOW
GLUCOSE BLDC GLUCOMTR-MCNC: 109 MG/DL — HIGH (ref 70–99)
GLUCOSE BLDC GLUCOMTR-MCNC: 145 MG/DL — HIGH (ref 70–99)
GLUCOSE BLDC GLUCOMTR-MCNC: 146 MG/DL — HIGH (ref 70–99)
GLUCOSE SERPL-MCNC: 110 MG/DL — HIGH (ref 70–99)
POTASSIUM SERPL-MCNC: 4.3 MMOL/L — SIGNIFICANT CHANGE UP (ref 3.5–5.3)
POTASSIUM SERPL-SCNC: 4.3 MMOL/L — SIGNIFICANT CHANGE UP (ref 3.5–5.3)
SODIUM SERPL-SCNC: 138 MMOL/L — SIGNIFICANT CHANGE UP (ref 135–145)

## 2024-06-22 PROCEDURE — 99239 HOSP IP/OBS DSCHRG MGMT >30: CPT

## 2024-06-22 RX ORDER — TAMSULOSIN HYDROCHLORIDE 0.4 MG/1
2 CAPSULE ORAL
Qty: 0 | Refills: 0 | DISCHARGE
Start: 2024-06-22

## 2024-06-22 RX ORDER — METFORMIN HYDROCHLORIDE 850 MG/1
1 TABLET ORAL
Refills: 0 | DISCHARGE

## 2024-06-22 RX ORDER — AMLODIPINE BESYLATE 10 MG/1
1 TABLET ORAL
Qty: 0 | Refills: 0 | DISCHARGE
Start: 2024-06-22

## 2024-06-22 RX ORDER — SITAGLIPTIN 100 MG/1
1 TABLET, FILM COATED ORAL
Qty: 30 | Refills: 0
Start: 2024-06-22 | End: 2024-07-21

## 2024-06-22 RX ORDER — FINASTERIDE 1 MG/1
1 TABLET, FILM COATED ORAL
Qty: 0 | Refills: 0 | DISCHARGE
Start: 2024-06-22

## 2024-06-22 RX ADMIN — AMLODIPINE BESYLATE 10 MILLIGRAM(S): 10 TABLET ORAL at 05:47

## 2024-06-22 RX ADMIN — HEPARIN SODIUM 5000 UNIT(S): 1000 INJECTION INTRAVENOUS; SUBCUTANEOUS at 13:42

## 2024-06-22 RX ADMIN — HEPARIN SODIUM 5000 UNIT(S): 1000 INJECTION INTRAVENOUS; SUBCUTANEOUS at 05:47

## 2024-06-22 RX ADMIN — FINASTERIDE 5 MILLIGRAM(S): 1 TABLET, FILM COATED ORAL at 11:53

## 2024-08-14 NOTE — PROGRESS NOTE ADULT - NS ATTEND OPT1 GEN_ALL_CORE

## 2024-08-19 NOTE — BRIEF OPERATIVE NOTE - TYPE OF ANESTHESIA
Patient with history of COPD.   Continue current regimen per primary team.   Outpatient follow-up with PCP.    General

## 2025-03-12 NOTE — DISCHARGE NOTE PROVIDER - PREFACE STATEMENT FOR MINUTES SPENT
Continue Catapres - additional PRN IV Labetalol on board for BP spikes  Previously on Toprol-XL, which patient currently denies taking  Also on PRN HCTZ daily for leg edema   I personally spent

## 2025-04-09 ENCOUNTER — APPOINTMENT (OUTPATIENT)
Dept: UROLOGY | Facility: HOSPITAL | Age: 75
End: 2025-04-09

## 2025-06-02 NOTE — ED PROVIDER NOTE - DISPOSITION TYPE
ADMIT
Severely impaired: cannot locate objects without hearing or touching them or patient nonresponsive.

## 2025-06-06 ENCOUNTER — INPATIENT (INPATIENT)
Facility: HOSPITAL | Age: 75
LOS: 3 days | Discharge: HOME HEALTH | End: 2025-06-10
Attending: STUDENT IN AN ORGANIZED HEALTH CARE EDUCATION/TRAINING PROGRAM | Admitting: STUDENT IN AN ORGANIZED HEALTH CARE EDUCATION/TRAINING PROGRAM
Payer: MEDICARE

## 2025-06-06 VITALS
HEIGHT: 66 IN | SYSTOLIC BLOOD PRESSURE: 159 MMHG | DIASTOLIC BLOOD PRESSURE: 83 MMHG | TEMPERATURE: 98 F | WEIGHT: 229.94 LBS | OXYGEN SATURATION: 97 % | HEART RATE: 83 BPM | RESPIRATION RATE: 16 BRPM

## 2025-06-06 DIAGNOSIS — Z98.890 OTHER SPECIFIED POSTPROCEDURAL STATES: Chronic | ICD-10-CM

## 2025-06-06 DIAGNOSIS — N20.0 CALCULUS OF KIDNEY: Chronic | ICD-10-CM

## 2025-06-06 PROCEDURE — 99285 EMERGENCY DEPT VISIT HI MDM: CPT

## 2025-06-06 RX ORDER — LIDOCAINE HYDROCHLORIDE 20 MG/ML
1 JELLY TOPICAL ONCE
Refills: 0 | Status: COMPLETED | OUTPATIENT
Start: 2025-06-06 | End: 2025-06-06

## 2025-06-06 RX ORDER — ACETAMINOPHEN 500 MG/5ML
1000 LIQUID (ML) ORAL ONCE
Refills: 0 | Status: COMPLETED | OUTPATIENT
Start: 2025-06-06 | End: 2025-06-06

## 2025-06-06 NOTE — ED PROVIDER NOTE - ATTENDING CONTRIBUTION TO CARE
74 year old [place] Creole speaking male, accompanied by daughter at bedside, language line  ID# [id number] Camilo with PMHx of  HTN, DM2, nephrolithiasis, chronic L flank pain presents c/o L sided Rib pain s/p fall yesterday, mechanical onto L side. Has been ambulatory since, has pain to L lower lateral chest wall, lungs clear. No abd ttp, no hip ttp, did not hit head. Took tylenol which gave some relief but still with pain. C/f possible rib fracture, CT chest ordered, no report of head trauma but given age will obtain CTH as well, pain meds, reassess

## 2025-06-06 NOTE — ED ADULT TRIAGE NOTE - CHIEF COMPLAINT QUOTE
c/o left rib pain s/p fall while sitting down on chair. Pt fell with the chair. Part of chair hit the left ribs. Denies any head trauma or LOC. Hx DM2, HTN. Fingerstick 265

## 2025-06-06 NOTE — ED PROVIDER NOTE - CLINICAL SUMMARY MEDICAL DECISION MAKING FREE TEXT BOX
Patient is a 74 year old [place] citizen Creole speaking male, accompanied by daughter at bedside, language line  ID# [id number] with PMHx of  HTN, DM2, nephrolithiasis, chronic L flank pain presents c/o L sided Rib pain s/p fall yesterday. Per daughter patient was walking yesterday and tried to sit down and put his hand on the backrest of the chair and the chair tipped over and he fell with his L ribs striking the chair. He needed assistance to be helped back up to walk again. Denies any preceeding symptoms, denies LOC. They note he has had subjective fevers since the fall and took tylenol last at 3pm. Also with decreased appetite denies abd pain. Patient denies any nausea, vomiting, chest pain, diarrhea, constipation, painful urination, new rashes.  VS non actionable  DDx/plan- Will obtain ct chest to eval rib fractures and CT head to eval for ICH.

## 2025-06-06 NOTE — ED PROVIDER NOTE - PROGRESS NOTE DETAILS
Sergio De Anda DO (PGY-2) Labs notable for UA with Leuk esterase, WBCs, Bacteria without epithelials. Ordered Ceftriaxone for UTI. Pending CTr. Sergio De Anda, DO (PGY-2) CTH negative, pending CT chest result Sergio De Anda, DO (PGY-2) CT chest showing acute L rib 7th fracture. Given UTI, rib fracture, and family no longer at bedside (attempted to call, no response) will admit. Sergio De Anda,  (PGY-2) Patient sleeping so not currently pulling incentive spirometry. Remains HDS. endorsed to hospitalist Dr. Palacios.

## 2025-06-06 NOTE — ED PROVIDER NOTE - NSICDXPASTMEDICALHX_GEN_ALL_CORE_FT
PAST MEDICAL HISTORY:  Arthrosis     [place] Creole  needed     HTN (hypertension)     Language barrier to communication     Poor compliance with medication     Poor historian     Renal stone     Type 2 diabetes mellitus

## 2025-06-07 DIAGNOSIS — I10 ESSENTIAL (PRIMARY) HYPERTENSION: ICD-10-CM

## 2025-06-07 DIAGNOSIS — S22.32XA FRACTURE OF ONE RIB, LEFT SIDE, INITIAL ENCOUNTER FOR CLOSED FRACTURE: ICD-10-CM

## 2025-06-07 DIAGNOSIS — J96.02 ACUTE RESPIRATORY FAILURE WITH HYPERCAPNIA: ICD-10-CM

## 2025-06-07 DIAGNOSIS — N39.0 URINARY TRACT INFECTION, SITE NOT SPECIFIED: ICD-10-CM

## 2025-06-07 DIAGNOSIS — N17.9 ACUTE KIDNEY FAILURE, UNSPECIFIED: ICD-10-CM

## 2025-06-07 DIAGNOSIS — E11.9 TYPE 2 DIABETES MELLITUS WITHOUT COMPLICATIONS: ICD-10-CM

## 2025-06-07 DIAGNOSIS — R07.81 PLEURODYNIA: ICD-10-CM

## 2025-06-07 LAB
ALBUMIN SERPL ELPH-MCNC: 4 G/DL — SIGNIFICANT CHANGE UP (ref 3.3–5)
ALP SERPL-CCNC: 69 U/L — SIGNIFICANT CHANGE UP (ref 40–120)
ALT FLD-CCNC: 17 U/L — SIGNIFICANT CHANGE UP (ref 4–41)
ANION GAP SERPL CALC-SCNC: 12 MMOL/L — SIGNIFICANT CHANGE UP (ref 7–14)
APPEARANCE UR: CLEAR — SIGNIFICANT CHANGE UP
APTT BLD: 30.8 SEC — SIGNIFICANT CHANGE UP (ref 26.1–36.8)
AST SERPL-CCNC: 15 U/L — SIGNIFICANT CHANGE UP (ref 4–40)
BACTERIA # UR AUTO: ABNORMAL /HPF
BASOPHILS # BLD AUTO: 0.04 K/UL — SIGNIFICANT CHANGE UP (ref 0–0.2)
BASOPHILS NFR BLD AUTO: 0.4 % — SIGNIFICANT CHANGE UP (ref 0–2)
BILIRUB SERPL-MCNC: 0.4 MG/DL — SIGNIFICANT CHANGE UP (ref 0.2–1.2)
BILIRUB UR-MCNC: NEGATIVE — SIGNIFICANT CHANGE UP
BUN SERPL-MCNC: 40 MG/DL — HIGH (ref 7–23)
CALCIUM SERPL-MCNC: 8.9 MG/DL — SIGNIFICANT CHANGE UP (ref 8.4–10.5)
CAST: 1 /LPF — SIGNIFICANT CHANGE UP (ref 0–4)
CHLORIDE SERPL-SCNC: 100 MMOL/L — SIGNIFICANT CHANGE UP (ref 98–107)
CO2 SERPL-SCNC: 22 MMOL/L — SIGNIFICANT CHANGE UP (ref 22–31)
COLOR SPEC: YELLOW — SIGNIFICANT CHANGE UP
CREAT SERPL-MCNC: 2.37 MG/DL — HIGH (ref 0.5–1.3)
DIFF PNL FLD: ABNORMAL
EGFR: 28 ML/MIN/1.73M2 — LOW
EGFR: 28 ML/MIN/1.73M2 — LOW
EOSINOPHIL # BLD AUTO: 0.19 K/UL — SIGNIFICANT CHANGE UP (ref 0–0.5)
EOSINOPHIL NFR BLD AUTO: 1.9 % — SIGNIFICANT CHANGE UP (ref 0–6)
GAS PNL BLDV: SIGNIFICANT CHANGE UP
GAS PNL BLDV: SIGNIFICANT CHANGE UP
GLUCOSE SERPL-MCNC: 186 MG/DL — HIGH (ref 70–99)
GLUCOSE UR QL: 100 MG/DL
HCT VFR BLD CALC: 43.9 % — SIGNIFICANT CHANGE UP (ref 39–50)
HGB BLD-MCNC: 15 G/DL — SIGNIFICANT CHANGE UP (ref 13–17)
IANC: 6.37 K/UL — SIGNIFICANT CHANGE UP (ref 1.8–7.4)
IMM GRANULOCYTES NFR BLD AUTO: 0.6 % — SIGNIFICANT CHANGE UP (ref 0–0.9)
INR BLD: 0.91 RATIO — SIGNIFICANT CHANGE UP (ref 0.85–1.16)
KETONES UR QL: NEGATIVE MG/DL — SIGNIFICANT CHANGE UP
LEUKOCYTE ESTERASE UR-ACNC: ABNORMAL
LYMPHOCYTES # BLD AUTO: 2.36 K/UL — SIGNIFICANT CHANGE UP (ref 1–3.3)
LYMPHOCYTES # BLD AUTO: 23.9 % — SIGNIFICANT CHANGE UP (ref 13–44)
MCHC RBC-ENTMCNC: 28.2 PG — SIGNIFICANT CHANGE UP (ref 27–34)
MCHC RBC-ENTMCNC: 34.2 G/DL — SIGNIFICANT CHANGE UP (ref 32–36)
MCV RBC AUTO: 82.7 FL — SIGNIFICANT CHANGE UP (ref 80–100)
MONOCYTES # BLD AUTO: 0.87 K/UL — SIGNIFICANT CHANGE UP (ref 0–0.9)
MONOCYTES NFR BLD AUTO: 8.8 % — SIGNIFICANT CHANGE UP (ref 2–14)
NEUTROPHILS # BLD AUTO: 6.37 K/UL — SIGNIFICANT CHANGE UP (ref 1.8–7.4)
NEUTROPHILS NFR BLD AUTO: 64.4 % — SIGNIFICANT CHANGE UP (ref 43–77)
NITRITE UR-MCNC: NEGATIVE — SIGNIFICANT CHANGE UP
NRBC # BLD AUTO: 0 K/UL — SIGNIFICANT CHANGE UP (ref 0–0)
NRBC # FLD: 0 K/UL — SIGNIFICANT CHANGE UP (ref 0–0)
NRBC BLD AUTO-RTO: 0 /100 WBCS — SIGNIFICANT CHANGE UP (ref 0–0)
PH UR: 6 — SIGNIFICANT CHANGE UP (ref 5–8)
PLATELET # BLD AUTO: 305 K/UL — SIGNIFICANT CHANGE UP (ref 150–400)
POTASSIUM SERPL-MCNC: 5 MMOL/L — SIGNIFICANT CHANGE UP (ref 3.5–5.3)
POTASSIUM SERPL-SCNC: 5 MMOL/L — SIGNIFICANT CHANGE UP (ref 3.5–5.3)
PROT SERPL-MCNC: 8.2 G/DL — SIGNIFICANT CHANGE UP (ref 6–8.3)
PROT UR-MCNC: 300 MG/DL
PROTHROM AB SERPL-ACNC: 10.8 SEC — SIGNIFICANT CHANGE UP (ref 9.9–13.4)
RBC # BLD: 5.31 M/UL — SIGNIFICANT CHANGE UP (ref 4.2–5.8)
RBC # FLD: 13.6 % — SIGNIFICANT CHANGE UP (ref 10.3–14.5)
RBC CASTS # UR COMP ASSIST: 3 /HPF — SIGNIFICANT CHANGE UP (ref 0–4)
SODIUM SERPL-SCNC: 134 MMOL/L — LOW (ref 135–145)
SP GR SPEC: 1.01 — SIGNIFICANT CHANGE UP (ref 1–1.03)
SQUAMOUS # UR AUTO: 0 /HPF — SIGNIFICANT CHANGE UP (ref 0–5)
UROBILINOGEN FLD QL: 0.2 MG/DL — SIGNIFICANT CHANGE UP (ref 0.2–1)
WBC # BLD: 9.89 K/UL — SIGNIFICANT CHANGE UP (ref 3.8–10.5)
WBC # FLD AUTO: 9.89 K/UL — SIGNIFICANT CHANGE UP (ref 3.8–10.5)
WBC UR QL: 132 /HPF — HIGH (ref 0–5)

## 2025-06-07 PROCEDURE — 71250 CT THORAX DX C-: CPT | Mod: 26

## 2025-06-07 PROCEDURE — 99223 1ST HOSP IP/OBS HIGH 75: CPT

## 2025-06-07 PROCEDURE — 70450 CT HEAD/BRAIN W/O DYE: CPT | Mod: 26

## 2025-06-07 RX ORDER — ENALAPRIL MALEATE 20 MG
1 TABLET ORAL
Refills: 0 | DISCHARGE

## 2025-06-07 RX ORDER — LIDOCAINE HYDROCHLORIDE 20 MG/ML
1 JELLY TOPICAL EVERY 24 HOURS
Refills: 0 | Status: DISCONTINUED | OUTPATIENT
Start: 2025-06-07 | End: 2025-06-10

## 2025-06-07 RX ORDER — CEFTRIAXONE 500 MG/1
1000 INJECTION, POWDER, FOR SOLUTION INTRAMUSCULAR; INTRAVENOUS EVERY 24 HOURS
Refills: 0 | Status: DISCONTINUED | OUTPATIENT
Start: 2025-06-08 | End: 2025-06-07

## 2025-06-07 RX ORDER — OXYCODONE HYDROCHLORIDE 30 MG/1
5 TABLET ORAL EVERY 4 HOURS
Refills: 0 | Status: DISCONTINUED | OUTPATIENT
Start: 2025-06-07 | End: 2025-06-10

## 2025-06-07 RX ORDER — DEXTROSE 50 % IN WATER 50 %
15 SYRINGE (ML) INTRAVENOUS ONCE
Refills: 0 | Status: DISCONTINUED | OUTPATIENT
Start: 2025-06-07 | End: 2025-06-10

## 2025-06-07 RX ORDER — ACETAMINOPHEN 500 MG/5ML
650 LIQUID (ML) ORAL EVERY 6 HOURS
Refills: 0 | Status: DISCONTINUED | OUTPATIENT
Start: 2025-06-07 | End: 2025-06-09

## 2025-06-07 RX ORDER — SENNA 187 MG
2 TABLET ORAL AT BEDTIME
Refills: 0 | Status: DISCONTINUED | OUTPATIENT
Start: 2025-06-07 | End: 2025-06-10

## 2025-06-07 RX ORDER — MAGNESIUM, ALUMINUM HYDROXIDE 200-200 MG
30 TABLET,CHEWABLE ORAL EVERY 4 HOURS
Refills: 0 | Status: DISCONTINUED | OUTPATIENT
Start: 2025-06-07 | End: 2025-06-10

## 2025-06-07 RX ORDER — CEFTRIAXONE 500 MG/1
1000 INJECTION, POWDER, FOR SOLUTION INTRAMUSCULAR; INTRAVENOUS ONCE
Refills: 0 | Status: COMPLETED | OUTPATIENT
Start: 2025-06-07 | End: 2025-06-07

## 2025-06-07 RX ORDER — SODIUM CHLORIDE 9 G/1000ML
1000 INJECTION, SOLUTION INTRAVENOUS
Refills: 0 | Status: DISCONTINUED | OUTPATIENT
Start: 2025-06-07 | End: 2025-06-10

## 2025-06-07 RX ORDER — NALOXONE HYDROCHLORIDE 0.4 MG/ML
0.4 INJECTION, SOLUTION INTRAMUSCULAR; INTRAVENOUS; SUBCUTANEOUS ONCE
Refills: 0 | Status: DISCONTINUED | OUTPATIENT
Start: 2025-06-07 | End: 2025-06-10

## 2025-06-07 RX ORDER — GLUCAGON 3 MG/1
1 POWDER NASAL ONCE
Refills: 0 | Status: DISCONTINUED | OUTPATIENT
Start: 2025-06-07 | End: 2025-06-10

## 2025-06-07 RX ORDER — MELATONIN 5 MG
3 TABLET ORAL AT BEDTIME
Refills: 0 | Status: DISCONTINUED | OUTPATIENT
Start: 2025-06-07 | End: 2025-06-10

## 2025-06-07 RX ORDER — ONDANSETRON HCL/PF 4 MG/2 ML
4 VIAL (ML) INJECTION EVERY 8 HOURS
Refills: 0 | Status: DISCONTINUED | OUTPATIENT
Start: 2025-06-07 | End: 2025-06-10

## 2025-06-07 RX ORDER — DEXTROSE 50 % IN WATER 50 %
25 SYRINGE (ML) INTRAVENOUS ONCE
Refills: 0 | Status: DISCONTINUED | OUTPATIENT
Start: 2025-06-07 | End: 2025-06-10

## 2025-06-07 RX ORDER — OXYCODONE HYDROCHLORIDE 30 MG/1
10 TABLET ORAL EVERY 4 HOURS
Refills: 0 | Status: DISCONTINUED | OUTPATIENT
Start: 2025-06-07 | End: 2025-06-10

## 2025-06-07 RX ORDER — DEXTROSE 50 % IN WATER 50 %
12.5 SYRINGE (ML) INTRAVENOUS ONCE
Refills: 0 | Status: DISCONTINUED | OUTPATIENT
Start: 2025-06-07 | End: 2025-06-10

## 2025-06-07 RX ORDER — BISACODYL 5 MG
5 TABLET, DELAYED RELEASE (ENTERIC COATED) ORAL DAILY
Refills: 0 | Status: DISCONTINUED | OUTPATIENT
Start: 2025-06-07 | End: 2025-06-10

## 2025-06-07 RX ORDER — HEPARIN SODIUM 1000 [USP'U]/ML
7500 INJECTION INTRAVENOUS; SUBCUTANEOUS EVERY 12 HOURS
Refills: 0 | Status: DISCONTINUED | OUTPATIENT
Start: 2025-06-07 | End: 2025-06-10

## 2025-06-07 RX ORDER — POLYETHYLENE GLYCOL 3350 17 G/17G
17 POWDER, FOR SOLUTION ORAL DAILY
Refills: 0 | Status: DISCONTINUED | OUTPATIENT
Start: 2025-06-07 | End: 2025-06-10

## 2025-06-07 RX ORDER — INSULIN LISPRO 100 U/ML
INJECTION, SOLUTION INTRAVENOUS; SUBCUTANEOUS
Refills: 0 | Status: DISCONTINUED | OUTPATIENT
Start: 2025-06-07 | End: 2025-06-10

## 2025-06-07 RX ORDER — INSULIN LISPRO 100 U/ML
INJECTION, SOLUTION INTRAVENOUS; SUBCUTANEOUS AT BEDTIME
Refills: 0 | Status: DISCONTINUED | OUTPATIENT
Start: 2025-06-07 | End: 2025-06-10

## 2025-06-07 RX ADMIN — OXYCODONE HYDROCHLORIDE 10 MILLIGRAM(S): 30 TABLET ORAL at 18:15

## 2025-06-07 RX ADMIN — Medication 1 APPLICATION(S): at 19:26

## 2025-06-07 RX ADMIN — LIDOCAINE HYDROCHLORIDE 1 PATCH: 20 JELLY TOPICAL at 13:04

## 2025-06-07 RX ADMIN — Medication 100 MILLILITER(S): at 17:46

## 2025-06-07 RX ADMIN — LIDOCAINE HYDROCHLORIDE 1 PATCH: 20 JELLY TOPICAL at 12:21

## 2025-06-07 RX ADMIN — LIDOCAINE HYDROCHLORIDE 1 PATCH: 20 JELLY TOPICAL at 00:39

## 2025-06-07 RX ADMIN — CEFTRIAXONE 100 MILLIGRAM(S): 500 INJECTION, POWDER, FOR SOLUTION INTRAMUSCULAR; INTRAVENOUS at 03:40

## 2025-06-07 RX ADMIN — LIDOCAINE HYDROCHLORIDE 1 PATCH: 20 JELLY TOPICAL at 10:17

## 2025-06-07 RX ADMIN — Medication 4 MILLIGRAM(S): at 19:44

## 2025-06-07 RX ADMIN — OXYCODONE HYDROCHLORIDE 10 MILLIGRAM(S): 30 TABLET ORAL at 17:45

## 2025-06-07 RX ADMIN — Medication 400 MILLIGRAM(S): at 00:39

## 2025-06-07 RX ADMIN — Medication 1000 MILLILITER(S): at 00:38

## 2025-06-07 RX ADMIN — HEPARIN SODIUM 7500 UNIT(S): 1000 INJECTION INTRAVENOUS; SUBCUTANEOUS at 17:28

## 2025-06-07 NOTE — H&P ADULT - PROBLEM SELECTOR PLAN 3
- complicated UTI (male)  - empiric UTI - Cr 2.37  - no known h/o kidney disease per pt and no prior labs available  - IV fluids and trend - Cr 2.37  - no known h/o kidney disease per pt and no prior labs available  - IV fluids and trend  - check ulytes

## 2025-06-07 NOTE — ED ADULT NURSE NOTE - NSFALLRISKINTERV_ED_ALL_ED
Assistance OOB with selected safe patient handling equipment if applicable/Assistance with ambulation/Communicate fall risk and risk factors to all staff, patient, and family/Monitor gait and stability/Provide visual cue: yellow wristband, yellow gown, etc/Reinforce activity limits and safety measures with patient and family/Call bell, personal items and telephone in reach/Instruct patient to call for assistance before getting out of bed/chair/stretcher/Non-slip footwear applied when patient is off stretcher/[place] to call system/Physically safe environment - no spills, clutter or unnecessary equipment/Purposeful Proactive Rounding/Room/bathroom lighting operational, light cord in reach

## 2025-06-07 NOTE — ED ADULT NURSE NOTE - OBJECTIVE STATEMENT
Received pt in room 26a, Pt is A&Ox 3 creole speaking  ID# [id number]  with past medical history of HTN, DM2, nephrolithiasis, chronic L flank pain . Pt came to the ED due to  L sided Rib pain s/p fall yesterday. Per daughter patient was walking yesterday and tried to sit down and put his hand on the backrest of the chair and the chair tipped over and he fell with his L ribs striking the chair. Pt breathing is equal and nonlabored. Pt abdomen is soft and nondistended. Pt denies any chest pain, SOB, headache, nausea, vomiting, diarrhea, fever or chills. US IV to the right AC 20g. Pt safety maintained

## 2025-06-07 NOTE — H&P ADULT - PROBLEM SELECTOR PLAN 1
- CT: minimally displaced L 7th side rib fracture  - treatment is pain mgmt  - incentive spirometer
no

## 2025-06-07 NOTE — H&P ADULT - PROBLEM SELECTOR PLAN 4
- pt reports on enalapril 20 at home. continue Asymptomatic bacturia  - denies  symptoms  - monitor off abx

## 2025-06-07 NOTE — H&P ADULT - HISTORY OF PRESENT ILLNESS
73 yo M PMH HTN, T2DM, nepholithiasis, chornic L flank pain presents to ED with fall 75 yo M PMH HTN, T2DM, nepholithiasis, chronic L flank pain presents to ED with fall. Patient is [place] creole speaking, assessed with  [id number]. Patient reports he was leaning on a metal chair and the chair slipped out from underneath him. He landed on his left side and now has severe 10/10 pain localized to his left flank. He reports the pain makes it difficult for him to take a deep breath. Denies SOB at rest however. He reports his only medication is enalapril 20 mg daily. Does not take medication for diabetes.  In ED, patient found to have acute minimally displaced L 7th rib fracture. UA was positive but patient denies urinary symptoms. VBG shows respiratory acidosis 73 yo M PMH HTN, T2DM, nephrolithiasis, chronic L flank pain presents to ED with fall. Patient is [place] creole speaking, assessed with  [id number]. Patient reports he was leaning on a metal chair and the chair slipped out from underneath him. He landed on his left side and now has severe 10/10 pain localized to his left flank. He reports the pain makes it difficult for him to take a deep breath. Denies SOB at rest however. He denies any dizziness or lightheadedness prior to his fall. He reports his only medication is enalapril 20 mg daily. Does not take medication for diabetes. Denies other medical problems  In ED, patient found to have acute minimally displaced L 7th rib fracture. UA was positive but patient denies urinary symptoms. VBG shows respiratory acidosis

## 2025-06-07 NOTE — H&P ADULT - PROBLEM SELECTOR PLAN 5
- pt reports not on treatment  - ISS accuchek  - a1c check - pt reports on enalapril 20 at home.  - holding for ED

## 2025-06-07 NOTE — ED ADULT NURSE NOTE - NS ED PATIENT SAFETY CONCERN
"  Physical Therapy Progress Note     Name: Damian KRAMER [facility] Number: [id number]    Therapy Diagnosis:   Encounter Diagnoses   Name Primary?    Decreased range of motion of right knee Yes    Quadriceps weakness        Physician: Maryjane Kong, NP-C    Visit Date: 12/6/2022    Physician Orders: PT Eval and Treat   Medical Diagnosis from Referral:   M17.11 (ICD-10-CM) - Primary osteoarthritis of right knee   M25.551 (ICD-10-CM) - Right hip pain      Evaluation Date: 9/21/2022  Plan of Care Expiration: 12/21/22     Authorization Period Expiration: 7/6/23  Visit # / Visits authorized: 5/24      Time In: 1345  Time Out: 1440  Total Billable Time: 30 minutes      Precautions: Standard      Subjective     Pt reports: the knee is improving with therapy.     He was compliant with home exercise program.  Response to previous treatment: good  Functional change: ongoing    Pain: 3/10  Location: right knee      Objective     Range of Motion/Strength:   .  Knee  Right    Left        AROM  MMT  AROM  MMT    Flexion  120 4- 130 4+   Extension  -4 4- 0 4+     Damian received therapeutic exercises to develop strength, endurance, ROM, and flexibility for 31 minutes including:    Recumbent bike    8 min, level 2  Shuttle Squats DL 50#  3x10  Wall sits    3x20"  +Lateral Heel Taps 4in R only  2x10  Gastroc stretch on wedge               30 sec x 4  TKE with pink tubing                        X 30  Quad sets                                          2 x 10, 3" hold  SLR     2 x 10  HS & Gastroc stretch w/ strap         1 min x 2  Matrix knee ext 10lbs  ECC   3 x 10  Matrix hamstring curl   35lbs  3 x 10  Matrix hip abd 40lbs   3 x 10    Damian received the following manual therapy techniques:  were applied to the: right knee for 5 minutes, including:  Patellar mobilization, tibiofemoral distraction        Home Exercises Provided and Patient Education Provided     Education provided:   Cont to perform HEP as provided.     Written Home " Exercises Provided: Patient instructed to cont prior HEP.  Exercises were reviewed and Damian was able to demonstrate them prior to the end of the session.  Damian demonstrated good  understanding of the education provided.     See EMR under Patient Instructions for exercises provided prior visit.    Assessment     Pt presents today with an improved gait, decreased antalgia, increased stance time.  Progressed therex today consisting of improving functional mobility of the right knee. No c/o increased discomfort with prescribed activities.  Good response to exercise progression.      Damian Is progressing well towards his goals.   Pt prognosis is Good.     Pt will continue to benefit from skilled outpatient physical therapy to address the deficits listed in the problem list box on initial evaluation, provide pt/family education and to maximize pt's level of independence in the home and community environment.     Pt's spiritual, cultural and educational needs considered and pt agreeable to plan of care and goals.    Anticipated barriers to physical therapy: none    Goals: Short Term Goals (4 Weeks):   Updated 12/6/23  MET     1.Pt to increase strength by a 1/2 grade of muscles test to allow for improvement in functional activities such as performing chores. MET   2.Pt to improve range of motion by 25% to allow for improved functional mobility to allow for improvement in IADLs.  MET  3.Pt to report compliance with HEP and demonstrate proper exercise technique to PT to show competence with self management of condition.MET  4.Decrease pain by 25% during functional activities. MET     Long Term Goals (12 Weeks):      1. Increase ROM to allow improved joint biomechanics during functional activities. (PROGRESSING, NOT MET)  2.Increase trunk and upper extremity strength to within normal limits during functional activities. (PROGRESSING, NOT MET)   3. Independent with home exercise program. (PROGRESSING, NOT MET)  4. Full return  to functional activities with manageable complaints. (PROGRESSING, NOT MET)  5. Patient to demonstrate improved posture and body mechanics. (PROGRESSING, NOT MET)  6. Decrease pain by 75% during functional activities. (PROGRESSING, NOT MET)    Plan     Progress knee stabilization therex next visit.     Delmer Hurtado, PT   12/06/2022                 No

## 2025-06-07 NOTE — ED ADULT NURSE REASSESSMENT NOTE - NS ED NURSE REASSESS COMMENT FT1
break RN: Pt sleeping in stretcher, arousable to verbal and tactile stimuli. Respirations even and unlabored on room air. No acute distress noted. Denies headache, dizziness, chest pain and SOB at this time. Pt medicated per EMAR. Plan of care ongoing, comfort measures provided and safety measures maintained. Awaiting results.

## 2025-06-07 NOTE — ED ADULT NURSE NOTE - NSSEPSISSUSPECTED_ED_A_ED
Cm Maynard. JAD Hernandez      Date of visit: 3/9/2017       Subjective:   Ms. Augustus Pyle is a long time pt of Dr. Ingrid Borja. She was initially dx with ovarian cancer in Sept 2012. She had MARAH/BSO/Cytoreductive surgery on 9/21/12. At that time she was Stage III C. She recovered and began 6 cycles of Carbo/Taxol. Had good response, but reoccurred and in Fall of 2014 she received 3 cycles of Carbo/Taxol before having a reaction to Carbo and this was stopped. She continued her next 3 cycles with Doxil/Avastin. This did not decrease disease. .   She had a treatment break from 3/2015 until she restarted with Topatecan for 11 cycles from July 2015 through March 2016. She was followed by Dr. Ingrid Borja and in Jan 2017, she had a CT scan that showed recurrence of disease (see below)      She began treatment with gemzar q21 days on Feb 2nd and presents today for cycle 2 D8  Today, her hgb is noted at 8.1 and pt acknowledges feeling \"run down\". She also has a tooth that her dentist has now told her needs to be pulled. It is beginning to cause her pain in her upper jaw area. She continues drinking \"green smoothies\" twice a day between meals that she says keeps her \"regular\". Of note, she has been found on CT to have renal calculi and has seen Dr. Altaf Mack (urology) and they are preparing to have the stones removed once she had cardiac clearance. Pt had stress test that she said Dr. Rock Scales said was OK. She is waiting to hear from DR. Neely's office as to when to proceed. She was started on abx after last visit as she was having frequency and urine culture grew out e-coli and she was started on Levaquin. Today she say is freeling better and that she is \"not urinating as often\". Denies dysuria, hematuria or pain. She is also having a difficult time with an upper molar and has seen a dentist and was told she needs it removed.     Her son is again with her today and supportive. Diagnosis: Ovarian cancer  Original Surgery: 9/21/12 Ex-lap, ROULA/BSO, radical tumor debulking  Pathology: High grade serious ovarian cancer with omental cake and carcinomatosis. Stage IIIC  Preop CA-125: 147 units      Current Outpatient Prescriptions   Medication Sig    levoFLOXacin (LEVAQUIN) 250 mg tablet Take 1 Tab by mouth daily.  HYDROcodone-acetaminophen (NORCO) 5-325 mg per tablet Take 1 Tab by mouth.  sertraline (ZOLOFT) 50 mg tablet TAKE 1 TABLET BY MOUTH EVERY MORNING    clonazePAM (KLONOPIN) 1 mg tablet TAKE 1 TABLET BY MOUTH TWICE A DAY    methocarbamol (ROBAXIN) 500 mg tablet Take  by mouth three (3) times daily.  oxyCODONE-acetaminophen (PERCOCET 7.5) 7.5-325 mg per tablet Take 1 Tab by mouth every four (4) hours as needed for Pain. Max Daily Amount: 6 Tabs.  ondansetron (ZOFRAN ODT) 8 mg disintegrating tablet Take 1 Tab by mouth every eight (8) hours as needed for Nausea.  valsartan (DIOVAN) 160 mg tablet TAKE 1 TAB BY MOUTH DAILY. REPLACES VALSARTAN-HCTZ    LEVEMIR FLEXTOUCH 100 unit/mL (3 mL) inpn 30 UNITS IN THE MORNING AND 50 UNITS AT NIGHT. INCREASE AS DIRECTED TO MAX  UNITS PER DAY    omega-3 acid ethyl esters (LOVAZA) 1 gram capsule TAKE ONE CAPSULE BY MOUTH TWICE A DAY    montelukast (SINGULAIR) 10 mg tablet Take 1 Tab by mouth daily.  levothyroxine (SYNTHROID) 150 mcg tablet TAKE 1 TAB BY MOUTH DAILY (BEFORE BREAKFAST).  Lancets misc Use as directed to test blood sugar three times daily-DX-DM-250.00    fluticasone-salmeterol (ADVAIR HFA) 230-21 mcg/actuation inhaler Take 2 Puffs by inhalation two (2) times a day.  BD INSULIN PEN NEEDLE UF SHORT 31 gauge x 5/16\" ndle USE WITH INSULIN TO INJECT FIVE TIMES DAILY.  albuterol (PROVENTIL VENTOLIN) 2.5 mg /3 mL (0.083 %) nebulizer solution 3 mL by Nebulization route every four (4) hours as needed for Wheezing.     Liraglutide (VICTOZA) 0.6 mg/0.1 mL (18 mg/3 mL) sub-q pen 0.3 No mL by SubCUTAneous route daily (with lunch).  NOVOLOG FLEXPEN 100 unit/mL inpn INJECT 20 UNITS BEFORE EACH MEAL + 4 UNITS FOR EVERY 50 MG/DL ABOVE 150 MG/DL.  UNITS PER DAY    LORazepam (ATIVAN) 1 mg tablet Take 1 Tab by mouth every six (6) hours as needed for Anxiety. Max Daily Amount: 4 mg. Indications: CANCER CHEMOTHERAPY-INDUCED NAUSEA AND VOMITING    potassium chloride SR (KLOR-CON 10) 10 mEq tablet Take 1 Tab by mouth two (2) days a week.  cholecalciferol, VITAMIN D3, (VITAMIN D3) 5,000 unit tab tablet Take  by mouth daily.  furosemide (LASIX) 40 mg tablet Take 80 mg by mouth as needed.  Blood-Glucose Meter monitoring kit Use as directed to test blood sugar three times daily-DX-DM-250.00    albuterol (PROVENTIL HFA, VENTOLIN HFA, PROAIR HFA) 90 mcg/actuation inhaler Take 2 Puffs by inhalation every four (4) hours as needed for Wheezing.  Bifidobacterium Infantis (ALIGN) 4 mg cap Take 1 Cap by mouth daily.  fexofenadine (ALLEGRA) 180 mg tablet Take 180 mg by mouth daily.  mometasone (NASONEX) 50 mcg/actuation nasal spray 2 sprays by Both Nostrils route as needed. Current Facility-Administered Medications   Medication Dose Route Frequency    sodium chloride 0.9 % injection 10 mL  10 mL IntraVENous PRN    heparin (porcine) pf 500 Units  500 Units IntraVENous PRN    sodium chloride (NS) flush 10 mL  10 mL IntraVENous PRN    0.9% sodium chloride infusion  50 mL/hr IntraVENous PRN    0.9% sodium chloride infusion 250 mL  250 mL IntraVENous PRN        Review of Systems:  General: Denies wt loss. Some fatigue seems to be lasting longer this past time  HEENT: Denies visual changes, dysphagia or headache  Resp: Denies SOB, or change in ECKERT, wheezing or cough  CV: Denies CP, palpitations  GI/: Denies N/V, diarrhea, constipation or dysuria  MuskSkel:Continues with DJD knee pain. She said she was told she needed a TKR, but has not gotten it yet.  Tries to take tylenol for pain when needed and rest leg  Neuro: Denies dizzyness or syncope    Objective:     Patient Vitals for the past 8 hrs:   BP Temp Pulse Resp Height Weight   03/09/17 1129 128/77 96.8 °F (36 °C) 67 18 - -   03/09/17 1106 - - - - 5' 8.5\" (1.74 m) 340 lb (154.2 kg)       Physical Exam:  General: A&O X3 in NAD  HEENT: Sclera anicteric, Mucosa pink, moist   Neck: No JVD. No cervical adenopathy appreciated. Heart: Regular with persistent 2-3/6 murmur  Lungs: CTA Bilat without wheezing or rales   Abd: Soft, obese, NT/ND with + BS throughout  Ext: Without edema and + pedal pulses bilat  Neuro: grossly intact      LABS: 3/9/2017:  WBC-4.7; HGB 8.1; HCT 25.2; Plt: 108; ANC 2.1;   Labs from 2/28/2017:Na 142; K+ 4.6; Cl 104; CO2 19; BUN32; Creat 1.38 (was 1.17 on 1/30/17); Gluc 168; HgA1C on 2/27 6.4  CA-125: 3/9/2017 181 (CA-125 on 2/27/17= 259; 1/30 347)      CT ABD/Pelvis without contrast 1/13/2017:  FINDINGS:   Solid organ evaluation is limited without contrast.      Multiple scattered intraperitoneal soft tissue nodules are overall increased. A  representative soft tissue mass in the pelvis adjacent to the rectum measures  3.6 x 3.8 cm (series 3, image 77), previously 1.6 x 1.9 cm. A representative  soft tissue nodule in the left hemipelvis measures 3.2 x 3.3 cm (series 3, image  70), previously 2.5 x 2.9 cm. An adjacent soft tissue nodule laterally measures  3.4 x 4.7 cm (series 3, image 72), previously 2.7 x 4.2 cm. A representative  conglomerate of soft tissue nodules adjacent to the pancreatic tail measures 3.6  x 4.8 cm (series 3, image 21), previously 3.2 x 3.4 cm.     A right inguinal lymph node measures 2.2 x 3.1 cm (series 3, image 83),  previously 1.8 x 2.6 cm.      The visualized lung bases demonstrate no mass or consolidation.  The heart size  is normal. There is no pericardial or pleural effusion.     A 1.2 cm calculus is again seen in the left renal pelvis with new mild left  hydronephrosis and mild fat stranding adjacent to the left renal hilum. Several  small nonobstructing calculi are also seen bilaterally. The urinary bladder is  grossly normal.     The liver, spleen, pancreas, and adrenal glands are normal. The gall bladder is  present without intra- or extra-hepatic biliary dilatation.      There are no dilated bowel loops. There is no free fluid or free air. The aorta  tapers without aneurysm.     The uterus and ovaries are surgically absent. There is no pelvic mass. There are  stable degenerative changes in the spine. There is no aggressive blastic or  lytic bony lesion.     IMPRESSION:   1. Overall progression of metastatic disease with multiple intraperitoneal soft  tissue implants. There is no ascites. There is also increased right inguinal  lymphadenopathy.     2. At 1.2 cm calculus is again seen in the left renal collecting system. There  is new mild left hydronephrosis and inflammation adjacent to the left renal  hilum. Correlation with urinalysis for infection is suggested. Pathology:   FINAL PATHOLOGIC DIAGNOSIS  1. Omentum, omentectomy:  Serous carcinoma, high grade  2. Soft tissue, umbilicus, biopsy:  Serous carcinoma, high grade  3. Peritoneum, periappendiceal, biopsy:  Serous carcinoma, high grade  4. Colonic mesentery, biopsy:  Serous carcinoma, high grade  See comment  5.  Uterus (157 grams), ovaries and fallopian tubes, supracervical hysterectomy and bilateral salpingooophorectomy:  Cervix: No diagnostic pathologic abnormality  Myometrium: No diagnostic pathologic abnormality  Endometrium: Benign polyp and cystic atrophy  Ovaries and fallopian tubes: Serous carcinoma, high grade  Serosa: Serous carcinoma implants, high-grade  See synoptic report  Synoptic Report:  Specimen: Uterus, ovaries and fallopian tubes, omentum  Procedure: Supracervical hysterectomy, bilateral salpingo-oophorectomy, omentectomy  Lymph node sampling: No lymph nodes present  Specimen integrity:  Right ovary: Intact        Assessment:     Patient Active Problem List   Diagnosis Code    Histor of ovarian cancer Z85.43    Carcinomatosis ([facility] C80.0    Controlled type 2 diabetes mellitus without complication, with long-term current use of insulin ([address] E11.9, Z79.4    Morbid obesity ([facility] E66.01    Abnormal mammogram R92.8    Sleep apnea G47.30    Dyslipidemia (high LDL; low HDL) E78.4    Benign essential hypertension I10    Disorder of thyroid E07.9    CINV (chemotherapy-induced nausea and vomiting) R11.2, T45.1X5A    Swelling of eye, bilateral--left > right H57.8    Portacath in place--left Z95.828    Chest pain, atypical R07.89    Hx of pulmonary embolus during pregnancy Z86.711, Z87.59    Elevated CA-125 R97.1    Ovarian cancer (HCC) C56.9    Hyperglycemia due to type 2 diabetes mellitus ([address] E11.65    Preop cardiovascular exam Z01.810    Angina pectoris associated with type 2 diabetes mellitus (HCC) E11.59, I20.9    ARF (acute renal failure) (HCC) N17.9    Renal calculi N20.0    Elevated BUN R79.9         Plan: Will proceed with C2D8 of Gemzar  She will follow up with urologist regarding hydronephrosis/stone abnormal urinalysis and procedure to remove stones. Hopefully, will help with elevated creat  She will follow up with dentist, but did not recommend tooth extraction until just before next chemotherapy if ABSOLUTELY necessary.   Discussed low HGB and with pt's increasing fatigue and in need of further chemotherapy, we will obtain a type & cross and transfuse with 2PURBC's tomorrow at [facility]    Follow up with cardiologist as needed  Follow up with PCP re-DM/HTN/Lipids (noted lipid profile drawn 2/27)  Continue current meds  Hydration encouraged  Encouraged to call for any concerns or questions  Terry Pack NP

## 2025-06-07 NOTE — ED ADULT NURSE NOTE - NSICDXPASTMEDICALHX_GEN_ALL_CORE_FT
PAST MEDICAL HISTORY:  Arthrosis     [place] Creole  needed     HTN (hypertension)     Language barrier to communication     Poor compliance with medication     Poor historian     Renal stone     Type 2 diabetes mellitus

## 2025-06-07 NOTE — H&P ADULT - NSHPPHYSICALEXAM_GEN_ALL_CORE
General: Alert and no acute distress. obese  Head: Normocephalic.  Neck: Supple, trachea midline  Eye: Pupils are equal, round and reactive to light and extraocular movements are intact.  Ears, nose, mouth and throat: Tympanic membranes clear and oral mucosa moist.  Cardiovascular: Regular rate and rhythm, No murmur  Respiratory: Lungs are clear to auscultation.  Chest wall: left lateral chest tenderness  Back: Nontender and no step-offs.  Musculoskeletal: Normal ROM, no tenderness, no swelling and no deformity.  Gastrointestinal: Soft, Nontender, Normal bowel sounds and No organomegaly.  Neurological: Alert and orientated to person, place, time, and situation, CN II – XII intact, normal sensory observed and normal motor observed.  Lymphatics: No lymphadenopathy.  Psychiatric: Cooperative and appropriate mood and affect.

## 2025-06-07 NOTE — H&P ADULT - PROBLEM SELECTOR PLAN 2
- Suspect this is due to hypoventilation due to rib fracture. lungs are clear, pt without SOB and not in any respiratory distress  - vbg ph 7.27/58  - repeat VBG

## 2025-06-07 NOTE — H&P ADULT - ASSESSMENT
73 yo M PMH HTN, T2DM, nepholithiasis, chornic L flank pain presents to ED with fall found to have UTI and L 7th rib fracture 73 yo M PMH HTN, T2DM, nephrolithiasis, chronic L flank pain presents to ED with fall found to have UTI and L 7th rib fracture

## 2025-06-08 LAB
A1C WITH ESTIMATED AVERAGE GLUCOSE RESULT: 8.4 % — HIGH (ref 4–5.6)
ANION GAP SERPL CALC-SCNC: 16 MMOL/L — HIGH (ref 7–14)
BUN SERPL-MCNC: 36 MG/DL — HIGH (ref 7–23)
CALCIUM SERPL-MCNC: 9.1 MG/DL — SIGNIFICANT CHANGE UP (ref 8.4–10.5)
CHLORIDE SERPL-SCNC: 99 MMOL/L — SIGNIFICANT CHANGE UP (ref 98–107)
CO2 SERPL-SCNC: 19 MMOL/L — LOW (ref 22–31)
CREAT SERPL-MCNC: 2.26 MG/DL — HIGH (ref 0.5–1.3)
EGFR: 30 ML/MIN/1.73M2 — LOW
EGFR: 30 ML/MIN/1.73M2 — LOW
ESTIMATED AVERAGE GLUCOSE: 194 — SIGNIFICANT CHANGE UP
GLUCOSE SERPL-MCNC: 192 MG/DL — HIGH (ref 70–99)
HCT VFR BLD CALC: 48 % — SIGNIFICANT CHANGE UP (ref 39–50)
HGB BLD-MCNC: 15.8 G/DL — SIGNIFICANT CHANGE UP (ref 13–17)
MCHC RBC-ENTMCNC: 27.4 PG — SIGNIFICANT CHANGE UP (ref 27–34)
MCHC RBC-ENTMCNC: 32.9 G/DL — SIGNIFICANT CHANGE UP (ref 32–36)
MCV RBC AUTO: 83.3 FL — SIGNIFICANT CHANGE UP (ref 80–100)
MRSA PCR RESULT.: SIGNIFICANT CHANGE UP
NRBC # BLD AUTO: 0 K/UL — SIGNIFICANT CHANGE UP (ref 0–0)
NRBC # FLD: 0 K/UL — SIGNIFICANT CHANGE UP (ref 0–0)
NRBC BLD AUTO-RTO: 0 /100 WBCS — SIGNIFICANT CHANGE UP (ref 0–0)
PLATELET # BLD AUTO: 305 K/UL — SIGNIFICANT CHANGE UP (ref 150–400)
POTASSIUM SERPL-MCNC: 5 MMOL/L — SIGNIFICANT CHANGE UP (ref 3.5–5.3)
POTASSIUM SERPL-SCNC: 5 MMOL/L — SIGNIFICANT CHANGE UP (ref 3.5–5.3)
RBC # BLD: 5.76 M/UL — SIGNIFICANT CHANGE UP (ref 4.2–5.8)
RBC # FLD: 13.6 % — SIGNIFICANT CHANGE UP (ref 10.3–14.5)
S AUREUS DNA NOSE QL NAA+PROBE: SIGNIFICANT CHANGE UP
SODIUM SERPL-SCNC: 134 MMOL/L — LOW (ref 135–145)
WBC # BLD: 9.85 K/UL — SIGNIFICANT CHANGE UP (ref 3.8–10.5)
WBC # FLD AUTO: 9.85 K/UL — SIGNIFICANT CHANGE UP (ref 3.8–10.5)

## 2025-06-08 PROCEDURE — 76770 US EXAM ABDO BACK WALL COMP: CPT | Mod: 26

## 2025-06-08 PROCEDURE — 99233 SBSQ HOSP IP/OBS HIGH 50: CPT

## 2025-06-08 RX ORDER — CEFTRIAXONE 500 MG/1
1000 INJECTION, POWDER, FOR SOLUTION INTRAMUSCULAR; INTRAVENOUS EVERY 24 HOURS
Refills: 0 | Status: COMPLETED | OUTPATIENT
Start: 2025-06-08 | End: 2025-06-09

## 2025-06-08 RX ORDER — AMLODIPINE BESYLATE 10 MG/1
2.5 TABLET ORAL DAILY
Refills: 0 | Status: DISCONTINUED | OUTPATIENT
Start: 2025-06-08 | End: 2025-06-10

## 2025-06-08 RX ADMIN — HEPARIN SODIUM 7500 UNIT(S): 1000 INJECTION INTRAVENOUS; SUBCUTANEOUS at 17:40

## 2025-06-08 RX ADMIN — HEPARIN SODIUM 7500 UNIT(S): 1000 INJECTION INTRAVENOUS; SUBCUTANEOUS at 05:29

## 2025-06-08 RX ADMIN — LIDOCAINE HYDROCHLORIDE 1 PATCH: 20 JELLY TOPICAL at 01:04

## 2025-06-08 RX ADMIN — POLYETHYLENE GLYCOL 3350 17 GRAM(S): 17 POWDER, FOR SOLUTION ORAL at 12:10

## 2025-06-08 RX ADMIN — CEFTRIAXONE 100 MILLIGRAM(S): 500 INJECTION, POWDER, FOR SOLUTION INTRAMUSCULAR; INTRAVENOUS at 12:09

## 2025-06-08 RX ADMIN — Medication 1 APPLICATION(S): at 05:33

## 2025-06-08 RX ADMIN — LIDOCAINE HYDROCHLORIDE 1 PATCH: 20 JELLY TOPICAL at 12:09

## 2025-06-08 RX ADMIN — OXYCODONE HYDROCHLORIDE 10 MILLIGRAM(S): 30 TABLET ORAL at 05:29

## 2025-06-08 RX ADMIN — AMLODIPINE BESYLATE 2.5 MILLIGRAM(S): 10 TABLET ORAL at 14:52

## 2025-06-08 RX ADMIN — INSULIN LISPRO 1: 100 INJECTION, SOLUTION INTRAVENOUS; SUBCUTANEOUS at 08:57

## 2025-06-08 RX ADMIN — Medication 100 MILLILITER(S): at 12:14

## 2025-06-08 RX ADMIN — OXYCODONE HYDROCHLORIDE 10 MILLIGRAM(S): 30 TABLET ORAL at 05:59

## 2025-06-08 NOTE — PHYSICAL THERAPY INITIAL EVALUATION ADULT - NSPTDISCHREC_GEN_A_CORE
Unable to determine.  Pt deferring functional activity.  Will follow up with functional activity if time permits.

## 2025-06-08 NOTE — PROGRESS NOTE ADULT - SUBJECTIVE AND OBJECTIVE BOX
[facility] Division of Hospital Medicine  Ortega Castro DO  Available via MS Teams  Pager: [phone]    Patient is a 74y old  Male who presents with a chief complaint of fall (07 Jun 2025 12:26)      SUBJECTIVE / OVERNIGHT EVENTS:    Pt seen and examined this morning. Pt admits to left sided chest/flank pain but otherwise denies any other acute events over night.     ADDITIONAL REVIEW OF SYSTEMS:  No Fever, chills, chest pain, shortness of breath.     MEDICATIONS  (STANDING):  chlorhexidine 2% Cloths 1 Application(s) Topical <User Schedule>  dextrose 5%. 1000 milliLiter(s) (100 mL/Hr) IV Continuous <Continuous>  dextrose 5%. 1000 milliLiter(s) (50 mL/Hr) IV Continuous <Continuous>  dextrose 50% Injectable 25 Gram(s) IV Push once  dextrose 50% Injectable 12.5 Gram(s) IV Push once  dextrose 50% Injectable 25 Gram(s) IV Push once  glucagon  Injectable 1 milliGRAM(s) IntraMuscular once  heparin   Injectable 7500 Unit(s) SubCutaneous every 12 hours  insulin lispro (ADMELOG) corrective regimen sliding scale   SubCutaneous three times a day before meals  insulin lispro (ADMELOG) corrective regimen sliding scale   SubCutaneous at bedtime  lidocaine   4% Patch 1 Patch Transdermal every 24 hours  naloxone Injectable 0.4 milliGRAM(s) IV Push once  polyethylene glycol 3350 17 Gram(s) Oral daily  senna 2 Tablet(s) Oral at bedtime  sodium chloride 0.9%. 1000 milliLiter(s) (100 mL/Hr) IV Continuous <Continuous>    MEDICATIONS  (PRN):  acetaminophen     Tablet .. 650 milliGRAM(s) Oral every 6 hours PRN Temp greater or equal to 38C (100.4F), Mild Pain (1 - 3)  aluminum hydroxide/magnesium hydroxide/simethicone Suspension 30 milliLiter(s) Oral every 4 hours PRN Dyspepsia  bisacodyl 5 milliGRAM(s) Oral daily PRN Constipation  dextrose Oral Gel 15 Gram(s) Oral once PRN Blood Glucose LESS THAN 70 milliGRAM(s)/deciliter  melatonin 3 milliGRAM(s) Oral at bedtime PRN Insomnia  ondansetron Injectable 4 milliGRAM(s) IV Push every 8 hours PRN Nausea and/or Vomiting  oxyCODONE    IR 5 milliGRAM(s) Oral every 4 hours PRN Moderate Pain (4 - 6)  oxyCODONE    IR 10 milliGRAM(s) Oral every 4 hours PRN Severe Pain (7 - 10)      I&O's Summary    07 Jun 2025 07:01  -  08 Jun 2025 07:00  --------------------------------------------------------  IN: 0 mL / OUT: 900 mL / NET: -900 mL        PHYSICAL EXAM:  Vital Signs Last 24 Hrs  T(C): 36.4 (08 Jun 2025 10:00), Max: 36.8 (07 Jun 2025 17:22)  T(F): 97.6 (08 Jun 2025 10:00), Max: 98.3 (07 Jun 2025 17:22)  HR: 75 (08 Jun 2025 10:00) (75 - 100)  BP: 170/98 (08 Jun 2025 10:00) (134/78 - 170/98)  BP(mean): --  RR: 19 (08 Jun 2025 10:00) (17 - 19)  SpO2: 100% (08 Jun 2025 10:00) (100% - 100%)    Parameters below as of 08 Jun 2025 10:00  Patient On (Oxygen Delivery Method): room air      CONSTITUTIONAL: NAD  EYES: PERRLA  ENMT: Moist oral mucosa  RESPIRATORY: Normal respiratory effort; lungs are clear to auscultation bilaterally  CARDIOVASCULAR: Regular rate and rhythm, normal S1 and S2, no murmur/rub/gallop  ABDOMEN: Nontender to palpation, normoactive bowel sounds  PSYCH: Alert  NEUROLOGY: CN 2-12 are intact and symmetric; no gross sensory deficits     LABS:                        15.8   9.85  )-----------( 305      ( 08 Jun 2025 06:11 )             48.0     06-08    134[L]  |  99  |  36[H]  ----------------------------<  192[H]  5.0   |  19[L]  |  2.26[H]    Ca    9.1      08 Jun 2025 06:11    TPro  8.2  /  Alb  4.0  /  TBili  0.4  /  DBili  x   /  AST  15  /  ALT  17  /  AlkPhos  69  06-07    PT/INR - ( 07 Jun 2025 00:07 )   PT: 10.8 sec;   INR: 0.91 ratio         PTT - ( 07 Jun 2025 00:07 )  PTT:30.8 sec      Urinalysis Basic - ( 08 Jun 2025 06:11 )    Color: x / Appearance: x / SG: x / pH: x  Gluc: 192 mg/dL / Ketone: x  / Bili: x / Urobili: x   Blood: x / Protein: x / Nitrite: x   Leuk Esterase: x / RBC: x / WBC x   Sq Epi: x / Non Sq Epi: x / Bacteria: x        Urinalysis with Rflx Culture (collected 07 Jun 2025 00:50)

## 2025-06-08 NOTE — OCCUPATIONAL THERAPY INITIAL EVALUATION ADULT - GENERAL OBSERVATIONS, REHAB EVAL
Pt received semisupine in bed in NAD +IV all lines intact. Pt OK to be seen for OT per RAEGAN Roberto.

## 2025-06-08 NOTE — OCCUPATIONAL THERAPY INITIAL EVALUATION ADULT - DIAGNOSIS, OT EVAL
Pt with impaired strength and standing balanc impacting ability to complete ADLs, IADLs, functional mobility/transfers.

## 2025-06-08 NOTE — OCCUPATIONAL THERAPY INITIAL EVALUATION ADULT - PERTINENT HX OF CURRENT PROBLEM, REHAB EVAL
As per EMR: "74 year old Male with past medical history of HTN, T2DM, nephrolithiasis, chronic Left flank pain presents to ED with fall found to have UTI and Left 7th rib fracture"  Admit dx: Pleurodynia

## 2025-06-08 NOTE — PHYSICAL THERAPY INITIAL EVALUATION ADULT - PERTINENT HX OF CURRENT PROBLEM, REHAB EVAL
73 yo M PMH HTN, T2DM, nephrolithiasis, chronic L flank pain presents to ED with fall. Patient is [place] creole speaking, assessed with  [id number]. Patient reports he was leaning on a metal chair and the chair slipped out from underneath him. He landed on his left side and now has severe 10/10 pain localized to his left flank. He reports the pain makes it difficult for him to take a deep breath. Denies SOB at rest however. He denies any dizziness or lightheadedness prior to his fall. He reports his only medication is enalapril 20 mg daily.

## 2025-06-08 NOTE — PHYSICAL THERAPY INITIAL EVALUATION ADULT - LEVEL OF INDEPENDENCE, REHAB EVAL
Pt deferring due to fatigue/ Weakness.  Encouraged pt to participate in functional activity.  Pt performed Straight leg raises b/l x 10.  Will continue to monitor patients functional abilities./unable to perform

## 2025-06-08 NOTE — PHYSICAL THERAPY INITIAL EVALUATION ADULT - MANUAL MUSCLE TESTING RESULTS, REHAB EVAL
Upper and lower extremities grossly assessed.  Bilaterally, pt presents with a 3/5 on MMT score.  LLE weakness/grossly assessed due to

## 2025-06-08 NOTE — OCCUPATIONAL THERAPY INITIAL EVALUATION ADULT - STANDING BALANCE: DYNAMIC, REHAB EVAL
Pt attempted side-stepping x 2 attempts however unable and with guarding / grimace noted in Pt although Pt denying pain. Pt unsteady on feet unable to advance for short functional mobility

## 2025-06-08 NOTE — PHYSICAL THERAPY INITIAL EVALUATION ADULT - ADDITIONAL COMMENTS
Pt lives in a home with daughter, few steps to negotiate, daughter assists as needed.    Patients Current SpO2: 99%

## 2025-06-08 NOTE — OCCUPATIONAL THERAPY INITIAL EVALUATION ADULT - LIVES WITH, PROFILE
Pt lives in a house, with his child, in the basement with 1 steps to enter, 2 steps down to basement, bathroom has a tub. Pt reports his child "helps with everything" in regards to ADLs. Pt was ambulating with a cane prior to admission./children

## 2025-06-09 DIAGNOSIS — Z29.9 ENCOUNTER FOR PROPHYLACTIC MEASURES, UNSPECIFIED: ICD-10-CM

## 2025-06-09 LAB
ANION GAP SERPL CALC-SCNC: 14 MMOL/L — SIGNIFICANT CHANGE UP (ref 7–14)
BUN SERPL-MCNC: 31 MG/DL — HIGH (ref 7–23)
CALCIUM SERPL-MCNC: 8.7 MG/DL — SIGNIFICANT CHANGE UP (ref 8.4–10.5)
CHLORIDE SERPL-SCNC: 102 MMOL/L — SIGNIFICANT CHANGE UP (ref 98–107)
CO2 SERPL-SCNC: 17 MMOL/L — LOW (ref 22–31)
CREAT SERPL-MCNC: 1.94 MG/DL — HIGH (ref 0.5–1.3)
EGFR: 35 ML/MIN/1.73M2 — LOW
EGFR: 35 ML/MIN/1.73M2 — LOW
GLUCOSE BLDC GLUCOMTR-MCNC: 119 MG/DL — HIGH (ref 70–99)
GLUCOSE BLDC GLUCOMTR-MCNC: 148 MG/DL — HIGH (ref 70–99)
GLUCOSE BLDC GLUCOMTR-MCNC: 156 MG/DL — HIGH (ref 70–99)
GLUCOSE SERPL-MCNC: 104 MG/DL — HIGH (ref 70–99)
HCT VFR BLD CALC: 44.7 % — SIGNIFICANT CHANGE UP (ref 39–50)
HGB BLD-MCNC: 15.5 G/DL — SIGNIFICANT CHANGE UP (ref 13–17)
MCHC RBC-ENTMCNC: 28.2 PG — SIGNIFICANT CHANGE UP (ref 27–34)
MCHC RBC-ENTMCNC: 34.7 G/DL — SIGNIFICANT CHANGE UP (ref 32–36)
MCV RBC AUTO: 81.4 FL — SIGNIFICANT CHANGE UP (ref 80–100)
NRBC # BLD AUTO: 0 K/UL — SIGNIFICANT CHANGE UP (ref 0–0)
NRBC # FLD: 0 K/UL — SIGNIFICANT CHANGE UP (ref 0–0)
NRBC BLD AUTO-RTO: 0 /100 WBCS — SIGNIFICANT CHANGE UP (ref 0–0)
PLATELET # BLD AUTO: 267 K/UL — SIGNIFICANT CHANGE UP (ref 150–400)
POTASSIUM SERPL-MCNC: 4.5 MMOL/L — SIGNIFICANT CHANGE UP (ref 3.5–5.3)
POTASSIUM SERPL-SCNC: 4.5 MMOL/L — SIGNIFICANT CHANGE UP (ref 3.5–5.3)
RBC # BLD: 5.49 M/UL — SIGNIFICANT CHANGE UP (ref 4.2–5.8)
RBC # FLD: 13.3 % — SIGNIFICANT CHANGE UP (ref 10.3–14.5)
SODIUM SERPL-SCNC: 133 MMOL/L — LOW (ref 135–145)
WBC # BLD: 7.76 K/UL — SIGNIFICANT CHANGE UP (ref 3.8–10.5)
WBC # FLD AUTO: 7.76 K/UL — SIGNIFICANT CHANGE UP (ref 3.8–10.5)

## 2025-06-09 PROCEDURE — 99233 SBSQ HOSP IP/OBS HIGH 50: CPT

## 2025-06-09 RX ORDER — ACETAMINOPHEN 500 MG/5ML
650 LIQUID (ML) ORAL EVERY 6 HOURS
Refills: 0 | Status: DISCONTINUED | OUTPATIENT
Start: 2025-06-09 | End: 2025-06-10

## 2025-06-09 RX ADMIN — LIDOCAINE HYDROCHLORIDE 1 PATCH: 20 JELLY TOPICAL at 23:15

## 2025-06-09 RX ADMIN — HEPARIN SODIUM 7500 UNIT(S): 1000 INJECTION INTRAVENOUS; SUBCUTANEOUS at 07:32

## 2025-06-09 RX ADMIN — OXYCODONE HYDROCHLORIDE 5 MILLIGRAM(S): 30 TABLET ORAL at 13:57

## 2025-06-09 RX ADMIN — AMLODIPINE BESYLATE 2.5 MILLIGRAM(S): 10 TABLET ORAL at 07:32

## 2025-06-09 RX ADMIN — POLYETHYLENE GLYCOL 3350 17 GRAM(S): 17 POWDER, FOR SOLUTION ORAL at 11:14

## 2025-06-09 RX ADMIN — Medication 650 MILLIGRAM(S): at 17:39

## 2025-06-09 RX ADMIN — Medication 650 MILLIGRAM(S): at 11:29

## 2025-06-09 RX ADMIN — HEPARIN SODIUM 7500 UNIT(S): 1000 INJECTION INTRAVENOUS; SUBCUTANEOUS at 17:40

## 2025-06-09 RX ADMIN — Medication 1 APPLICATION(S): at 07:33

## 2025-06-09 RX ADMIN — OXYCODONE HYDROCHLORIDE 5 MILLIGRAM(S): 30 TABLET ORAL at 14:30

## 2025-06-09 RX ADMIN — OXYCODONE HYDROCHLORIDE 10 MILLIGRAM(S): 30 TABLET ORAL at 22:45

## 2025-06-09 RX ADMIN — Medication 100 MILLILITER(S): at 09:16

## 2025-06-09 RX ADMIN — LIDOCAINE HYDROCHLORIDE 1 PATCH: 20 JELLY TOPICAL at 00:00

## 2025-06-09 RX ADMIN — OXYCODONE HYDROCHLORIDE 10 MILLIGRAM(S): 30 TABLET ORAL at 22:15

## 2025-06-09 RX ADMIN — LIDOCAINE HYDROCHLORIDE 1 PATCH: 20 JELLY TOPICAL at 11:15

## 2025-06-09 RX ADMIN — CEFTRIAXONE 100 MILLIGRAM(S): 500 INJECTION, POWDER, FOR SOLUTION INTRAMUSCULAR; INTRAVENOUS at 11:14

## 2025-06-09 NOTE — PROGRESS NOTE ADULT - TIME BILLING
- Ordering, reviewing, and interpreting labs, testing, and imaging.  - Independently obtaining a review of systems and performing a physical exam  - Reviewing consultant documentation/recommendations in addition to discussing plan of care with consultants.  - Counselling and educating patient and family regarding interpretation of aforementioned items and plan of care.
I have spent 51 minutes of time on the encounter which excludes teaching and separately reported services.

## 2025-06-09 NOTE — PROGRESS NOTE ADULT - SUBJECTIVE AND OBJECTIVE BOX
[facility] Division of Hospital Medicine  Wendi Dow MD  Available via MS Teams    SUBJECTIVE / OVERNIGHT EVENTS: No acute overnight events. Pt reports that left rib pain is not well controlled, worse with movement and palpations. No chest pain, SOB, abd pain, n/v/d    ADDITIONAL REVIEW OF SYSTEMS: negative    MEDICATIONS  (STANDING):  acetaminophen     Tablet .. 650 milliGRAM(s) Oral every 6 hours  amLODIPine   Tablet 2.5 milliGRAM(s) Oral daily  chlorhexidine 2% Cloths 1 Application(s) Topical <User Schedule>  dextrose 5%. 1000 milliLiter(s) (100 mL/Hr) IV Continuous <Continuous>  dextrose 5%. 1000 milliLiter(s) (50 mL/Hr) IV Continuous <Continuous>  dextrose 50% Injectable 25 Gram(s) IV Push once  dextrose 50% Injectable 12.5 Gram(s) IV Push once  dextrose 50% Injectable 25 Gram(s) IV Push once  glucagon  Injectable 1 milliGRAM(s) IntraMuscular once  heparin   Injectable 7500 Unit(s) SubCutaneous every 12 hours  insulin lispro (ADMELOG) corrective regimen sliding scale   SubCutaneous three times a day before meals  insulin lispro (ADMELOG) corrective regimen sliding scale   SubCutaneous at bedtime  lidocaine   4% Patch 1 Patch Transdermal every 24 hours  naloxone Injectable 0.4 milliGRAM(s) IV Push once  polyethylene glycol 3350 17 Gram(s) Oral daily  senna 2 Tablet(s) Oral at bedtime  sodium chloride 0.9%. 1000 milliLiter(s) (100 mL/Hr) IV Continuous <Continuous>    MEDICATIONS  (PRN):  aluminum hydroxide/magnesium hydroxide/simethicone Suspension 30 milliLiter(s) Oral every 4 hours PRN Dyspepsia  bisacodyl 5 milliGRAM(s) Oral daily PRN Constipation  dextrose Oral Gel 15 Gram(s) Oral once PRN Blood Glucose LESS THAN 70 milliGRAM(s)/deciliter  melatonin 3 milliGRAM(s) Oral at bedtime PRN Insomnia  ondansetron Injectable 4 milliGRAM(s) IV Push every 8 hours PRN Nausea and/or Vomiting  oxyCODONE    IR 5 milliGRAM(s) Oral every 4 hours PRN Moderate Pain (4 - 6)  oxyCODONE    IR 10 milliGRAM(s) Oral every 4 hours PRN Severe Pain (7 - 10)      I&O's Summary    08 Jun 2025 07:01  -  09 Jun 2025 07:00  --------------------------------------------------------  IN: 1500 mL / OUT: 1935 mL / NET: -435 mL    09 Jun 2025 07:01  -  09 Jun 2025 15:36  --------------------------------------------------------  IN: 250 mL / OUT: 400 mL / NET: -150 mL        PHYSICAL EXAM:  Vital Signs Last 24 Hrs  T(C): 36.2 (09 Jun 2025 13:52), Max: 36.8 (09 Jun 2025 01:44)  T(F): 97.2 (09 Jun 2025 13:52), Max: 98.3 (09 Jun 2025 01:44)  HR: 89 (09 Jun 2025 13:52) (76 - 89)  BP: 188/99 (09 Jun 2025 13:52) (149/86 - 188/99)  BP(mean): --  RR: 18 (09 Jun 2025 13:52) (18 - 18)  SpO2: 100% (09 Jun 2025 13:52) (100% - 100%)    Parameters below as of 09 Jun 2025 13:52  Patient On (Oxygen Delivery Method): room air      CONSTITUTIONAL: NAD, well-developed, well-groomed  EYES: PERRLA; conjunctiva and sclera clear  ENMT: Moist oral mucosa, no pharyngeal injection or exudates; normal dentition  NECK: Supple, no palpable masses; no thyromegaly  RESPIRATORY: Normal respiratory effort; lungs are clear to auscultation bilaterally, left lateral rib tenderness on palpation  CARDIOVASCULAR: Regular rate and rhythm, normal S1 and S2, no murmur/rub/gallop; No lower extremity edema; Peripheral pulses are 2+ bilaterally  ABDOMEN: Nontender to palpation, normoactive bowel sounds, no rebound/guarding; No hepatosplenomegaly  MUSCULOSKELETAL:  no clubbing or cyanosis of digits; no joint swelling or tenderness to palpation  PSYCH: A+O to person, place, and time; affect appropriate  NEUROLOGY: CN 2-12 are intact and symmetric; no gross sensory deficits   SKIN: No rashes; no palpable lesions    LABS:                        15.5   7.76  )-----------( 267      ( 09 Jun 2025 07:00 )             44.7     Hb Trend: 15.5<--, 15.8<--, 15.0<--  06-09    133[L]  |  102  |  31[H]  ----------------------------<  104[H]  4.5   |  17[L]  |  1.94[H]    Ca    8.7      09 Jun 2025 07:00      Creatinine Trend: 1.94<--, 2.26<--, 2.37<--        Urinalysis Basic - ( 09 Jun 2025 07:00 )    Color: x / Appearance: x / SG: x / pH: x  Gluc: 104 mg/dL / Ketone: x  / Bili: x / Urobili: x   Blood: x / Protein: x / Nitrite: x   Leuk Esterase: x / RBC: x / WBC x   Sq Epi: x / Non Sq Epi: x / Bacteria: x        Urinalysis with Rflx Culture (collected 07 Jun 2025 00:50)          RADIOLOGY & ADDITIONAL TESTS:  No new imaging    COORDINATION OF CARE:  Consultant Communication and Details of Discussion (where applicable): Discussed at IDRs, will dc with outpt PT tomorrow

## 2025-06-09 NOTE — PROGRESS NOTE ADULT - ASSESSMENT
73 yo M PMH HTN, T2DM, nephrolithiasis, chronic L flank pain presents to ED with fall found to have UTI and L 7th rib fracture
73 yo M PMH HTN, T2DM, nephrolithiasis, chronic L flank pain presents to ED with fall found to have UTI and L 7th rib fracture

## 2025-06-09 NOTE — PROGRESS NOTE ADULT - PROBLEM SELECTOR PLAN 1
- CT: minimally displaced L 7th side rib fracture  - continue pain management with lidocaine patch, tylenol, oxy prn  - incentive spirometer
- CT: minimally displaced L 7th side rib fracture  - treatment is pain mgmt  - incentive spirometer

## 2025-06-09 NOTE — PROGRESS NOTE ADULT - PROBLEM SELECTOR PLAN 3
- Cr 2.37  - no known h/o kidney disease per pt and no prior labs available  - IV fluids and trend, likely 2/2 uti, ctm  - f/u renal us
renal US with medical renal disease  - creatinine improving  - trend BMP, if stable can likely dc tomorrow

## 2025-06-09 NOTE — PROGRESS NOTE ADULT - PROBLEM SELECTOR PLAN 6
- pt reports not on treatment, however sure scripts notes farxiga and lantus, would resume on dc  - ISS accuchek  - a1c 8.4
- pt reports not on treatment  - ISS accuchek  - a1c 8.4

## 2025-06-09 NOTE — PROGRESS NOTE ADULT - PROBLEM SELECTOR PLAN 2
- Suspect this is due to hypoventilation due to rib fracture. lungs are clear, pt without SOB and not in any respiratory distress  - vbg ph 7.27/58, repeat vbg improved pH 7.36
- Suspect this is due to hypoventilation due to rib fracture. lungs are clear, pt without SOB and not in any respiratory distress  - vbg ph 7.27/58, repeat vbg improved pH 7.36

## 2025-06-09 NOTE — PROGRESS NOTE ADULT - PROBLEM SELECTOR PLAN 5
- pt reports on enalapril 20 at home.  - holding for ED
- pt reports on enalapril 20 at home.  - holding for ED  - continue amlodipine

## 2025-06-09 NOTE — PROGRESS NOTE ADULT - PROBLEM SELECTOR PLAN 4
Asymptomatic bacteria  - denies  symptoms but presents with ED  - complete tx with ceftriaxone, s/p 1 dose in ED  - f/u Ucx
Asymptomatic bacteria  - denies  symptoms but presents with ED  - s/p CTX

## 2025-06-10 ENCOUNTER — TRANSCRIPTION ENCOUNTER (OUTPATIENT)
Age: 75
End: 2025-06-10

## 2025-06-10 VITALS
RESPIRATION RATE: 18 BRPM | SYSTOLIC BLOOD PRESSURE: 159 MMHG | DIASTOLIC BLOOD PRESSURE: 90 MMHG | HEART RATE: 82 BPM | TEMPERATURE: 98 F | OXYGEN SATURATION: 99 %

## 2025-06-10 LAB
ANION GAP SERPL CALC-SCNC: 14 MMOL/L — SIGNIFICANT CHANGE UP (ref 7–14)
BUN SERPL-MCNC: 30 MG/DL — HIGH (ref 7–23)
CALCIUM SERPL-MCNC: 8.4 MG/DL — SIGNIFICANT CHANGE UP (ref 8.4–10.5)
CHLORIDE SERPL-SCNC: 102 MMOL/L — SIGNIFICANT CHANGE UP (ref 98–107)
CO2 SERPL-SCNC: 18 MMOL/L — LOW (ref 22–31)
CREAT SERPL-MCNC: 2.12 MG/DL — HIGH (ref 0.5–1.3)
EGFR: 32 ML/MIN/1.73M2 — LOW
EGFR: 32 ML/MIN/1.73M2 — LOW
GLUCOSE BLDC GLUCOMTR-MCNC: 102 MG/DL — HIGH (ref 70–99)
GLUCOSE BLDC GLUCOMTR-MCNC: 115 MG/DL — HIGH (ref 70–99)
GLUCOSE BLDC GLUCOMTR-MCNC: 97 MG/DL — SIGNIFICANT CHANGE UP (ref 70–99)
GLUCOSE SERPL-MCNC: 111 MG/DL — HIGH (ref 70–99)
POTASSIUM SERPL-MCNC: 4.7 MMOL/L — SIGNIFICANT CHANGE UP (ref 3.5–5.3)
POTASSIUM SERPL-SCNC: 4.7 MMOL/L — SIGNIFICANT CHANGE UP (ref 3.5–5.3)
SODIUM SERPL-SCNC: 134 MMOL/L — LOW (ref 135–145)

## 2025-06-10 PROCEDURE — 99239 HOSP IP/OBS DSCHRG MGMT >30: CPT

## 2025-06-10 RX ORDER — DONEPEZIL HYDROCHLORIDE 5 MG/1
1 TABLET ORAL
Refills: 0 | DISCHARGE

## 2025-06-10 RX ORDER — ACETAMINOPHEN 500 MG/5ML
2 LIQUID (ML) ORAL
Qty: 40 | Refills: 0
Start: 2025-06-10 | End: 2025-06-14

## 2025-06-10 RX ORDER — DAPAGLIFLOZIN 5 MG/1
1 TABLET, FILM COATED ORAL
Refills: 0 | DISCHARGE

## 2025-06-10 RX ORDER — ISOPROPYL ALCOHOL, BENZOCAINE .7; .06 ML/ML; ML/ML
0 SWAB TOPICAL
Qty: 100 | Refills: 1
Start: 2025-06-10

## 2025-06-10 RX ORDER — OXYCODONE HYDROCHLORIDE 30 MG/1
1 TABLET ORAL
Qty: 18 | Refills: 0
Start: 2025-06-10 | End: 2025-06-12

## 2025-06-10 RX ORDER — ATORVASTATIN CALCIUM 80 MG/1
1 TABLET, FILM COATED ORAL
Refills: 0 | DISCHARGE

## 2025-06-10 RX ORDER — INSULIN GLARGINE-YFGN 100 [IU]/ML
20 INJECTION, SOLUTION SUBCUTANEOUS
Refills: 0 | DISCHARGE

## 2025-06-10 RX ORDER — FINASTERIDE 1 MG/1
1 TABLET, FILM COATED ORAL
Refills: 0 | DISCHARGE

## 2025-06-10 RX ADMIN — OXYCODONE HYDROCHLORIDE 10 MILLIGRAM(S): 30 TABLET ORAL at 02:23

## 2025-06-10 RX ADMIN — Medication 650 MILLIGRAM(S): at 17:17

## 2025-06-10 RX ADMIN — LIDOCAINE HYDROCHLORIDE 1 PATCH: 20 JELLY TOPICAL at 11:10

## 2025-06-10 RX ADMIN — HEPARIN SODIUM 7500 UNIT(S): 1000 INJECTION INTRAVENOUS; SUBCUTANEOUS at 17:18

## 2025-06-10 RX ADMIN — POLYETHYLENE GLYCOL 3350 17 GRAM(S): 17 POWDER, FOR SOLUTION ORAL at 11:10

## 2025-06-10 RX ADMIN — Medication 650 MILLIGRAM(S): at 11:09

## 2025-06-10 RX ADMIN — Medication 650 MILLIGRAM(S): at 06:43

## 2025-06-10 RX ADMIN — Medication 650 MILLIGRAM(S): at 07:13

## 2025-06-10 RX ADMIN — Medication 1 APPLICATION(S): at 06:52

## 2025-06-10 RX ADMIN — OXYCODONE HYDROCHLORIDE 10 MILLIGRAM(S): 30 TABLET ORAL at 02:53

## 2025-06-10 RX ADMIN — HEPARIN SODIUM 7500 UNIT(S): 1000 INJECTION INTRAVENOUS; SUBCUTANEOUS at 06:43

## 2025-06-10 RX ADMIN — AMLODIPINE BESYLATE 2.5 MILLIGRAM(S): 10 TABLET ORAL at 06:42

## 2025-06-10 NOTE — DISCHARGE NOTE PROVIDER - NSDCMRMEDTOKEN_GEN_ALL_CORE_FT
enalapril 20 mg oral tablet: 1 tab(s) orally once a day   atorvastatin 40 mg oral tablet: 1 tab(s) orally once a day  donepezil 5 mg oral tablet: 1 tab(s) orally once a day  enalapril 20 mg oral tablet: 1 tab(s) orally once a day  Farxiga 10 mg oral tablet: 1 tab(s) orally once a day  finasteride 5 mg oral tablet: 1 tab(s) orally  Insulin Glargine: 20 unit(s) subcutaneous once a day (at bedtime)  Outpatient Physical Therapy : Please evaluate and assess for Outpatient PT 3 times weekly for 90 days, or as needed. Thanks   acetaminophen 325 mg oral tablet: 2 tab(s) orally every 6 hours  atorvastatin 40 mg oral tablet: 1 tab(s) orally once a day  donepezil 5 mg oral tablet: 1 tab(s) orally once a day  enalapril 20 mg oral tablet: 1 tab(s) orally once a day  Farxiga 10 mg oral tablet: 1 tab(s) orally once a day  finasteride 5 mg oral tablet: 1 tab(s) orally  Insulin Glargine: 20 unit(s) subcutaneous once a day (at bedtime)  oxyCODONE 5 mg oral tablet: 1 tab(s) orally every 4 hours as needed for Moderate Pain (4 - 6) MDD: 30mg

## 2025-06-10 NOTE — DISCHARGE NOTE PROVIDER - ATTENDING DISCHARGE PHYSICAL EXAMINATION:
T(C): 36.5 (06-10-25 @ 13:24), Max: 36.7 (06-10-25 @ 10:37)  HR: 82 (06-10-25 @ 13:24) (71 - 82)  BP: 159/90 (06-10-25 @ 13:24) (144/80 - 177/98)  RR: 18 (06-10-25 @ 13:24) (18 - 18)  SpO2: 99% (06-10-25 @ 13:24) (98% - 100%)    CONSTITUTIONAL: Well groomed, no apparent distress  EYES: PERRLA and symmetric, EOMI, No conjunctival or scleral injection, non-icteric  ENMT: Oral mucosa with moist membranes. Normal dentition; no pharyngeal injection or exudates  NECK: Supple, trachea midline  RESP: No respiratory distress, no use of accessory muscles; CTA b/l, no WRR  CV: RRR, +S1S2, no MRG; no JVD; no peripheral edema  GI: Soft, NT, ND, no rebound, no guarding; no palpable masses; no hepatosplenomegaly; no hernia palpated  LYMPH: No cervical LAD or tenderness; no axillary LAD or tenderness; no inguinal LAD or tenderness  MSK: No digital clubbing or cyanosis; examination of the (head/neck/spine/ribs/pelvis, RUE, LUE, RLE, LLE) without misalignment,            Normal ROM without pain, no spinal tenderness, normal muscle strength/tone  SKIN: No rashes or ulcers noted; no subcutaneous nodules or induration palpable  NEURO: CN II-XII intact; no focal motor deficits, sensation intact in upper and lower extremities b/l to light touch   PSYCH: Appropriate insight/judgment; A+O x 3, mood and affect appropriate, recent/remote memory intact

## 2025-06-10 NOTE — CHART NOTE - NSCHARTNOTEFT_GEN_A_CORE
Rebecca Jong Claude is confined to a floor in the home without toileting facility, therefore he will require a Commode.

## 2025-06-10 NOTE — PROVIDER CONTACT NOTE (OTHER) - RECOMMENDATIONS
manual BP taken 166/92
Pain medication given as per order. Patient laying comfortably in bed. Will retake vitals and continue to monitor patient.
pain medication given, recheck /82
declines

## 2025-06-10 NOTE — DISCHARGE NOTE PROVIDER - HOSPITAL COURSE
HPI:  73 yo M PMH HTN, T2DM, nephrolithiasis, chronic L flank pain presents to ED with fall. Patient is [place]ani creole speaking, assessed with  [id number]. Patient reports he was leaning on a metal chair and the chair slipped out from underneath him. He landed on his left side and now has severe 10/10 pain localized to his left flank. He reports the pain makes it difficult for him to take a deep breath. Denies SOB at rest however. He denies any dizziness or lightheadedness prior to his fall. He reports his only medication is enalapril 20 mg daily. Does not take medication for diabetes. Denies other medical problems  In ED, patient found to have acute minimally displaced L 7th rib fracture. UA was positive but patient denies urinary symptoms. VBG shows respiratory acidosis (07 Jun 2025 12:26)    Hospital Course:    Pt was started on pain medication regimen for management of rib fracture. Review of prior labs note that his baseline creatinine is near 2.2, and IVF stopped.       Important Medication Changes and Reason:    Active or Pending Issues Requiring Follow-up:    Advanced Directives:   [ ] Full code  [ ] DNR  [ ] Hospice    Discharge Diagnoses:         HPI:  75 yo M PMH HTN, T2DM, nephrolithiasis, chronic L flank pain presents to ED with fall. Patient is [place] creole speaking, assessed with  [id number]. Patient reports he was leaning on a metal chair and the chair slipped out from underneath him. He landed on his left side and now has severe 10/10 pain localized to his left flank. He reports the pain makes it difficult for him to take a deep breath. Denies SOB at rest however. He denies any dizziness or lightheadedness prior to his fall. He reports his only medication is enalapril 20 mg daily. Does not take medication for diabetes. Denies other medical problems  In ED, patient found to have acute minimally displaced L 7th rib fracture. UA was positive but patient denies urinary symptoms. VBG shows respiratory acidosis (07 Jun 2025 12:26)    Hospital Course:    Pt was started on pain medication regimen for management of rib fracture. Review of prior labs note that his baseline creatinine is near 2.2, and IVF stopped.       Important Medication Changes and Reason:  Continue home medications    Active or Pending Issues Requiring Follow-up:  - outpt PT  - follow up with PMD in one week    Advanced Directives:   [x] Full code  [ ] DNR  [ ] Hospice    Discharge Diagnoses:  - rib fracture

## 2025-06-10 NOTE — DISCHARGE NOTE PROVIDER - NSDCCPCAREPLAN_GEN_ALL_CORE_FT
PRINCIPAL DISCHARGE DIAGNOSIS  Diagnosis: Pain in rib  Assessment and Plan of Treatment: You were found to have a broken rib. Continue to use oxycodone as needed for pain. Take tylenol 650mg every 8 hours as needed for pain. Use the incentive spirometer as instructed. Return to the ED for fevers, chills, chest pain, or any other concerning symptoms.      SECONDARY DISCHARGE DIAGNOSES  Diagnosis: Type 2 diabetes mellitus  Assessment and Plan of Treatment: You have uncontrolled diabetes. You were recently prescribed farxiga and lantus. Resume those medications when you leave the hospital. Follow up with your PMD within one week of discharge.

## 2025-06-10 NOTE — PROVIDER CONTACT NOTE (OTHER) - ASSESSMENT
Patient asymptomatic. Patient complained of pain. Provider made aware and PRN pain medication given as per order. Provider aware.
pt complaining of pain
pt asymptomatic

## 2025-06-10 NOTE — DISCHARGE NOTE PROVIDER - DISCHARGE SERVICE FOR PATIENT
Refilled per protocol    Last OV: 9/20/23  Next OV: n/a  Last lab: 9/12/23    Name from pharmacy: Levothyroxine Sodium 50 MCG Oral Tablet          Will file in chart as: levothyroxine 50 MCG tablet    Sig: Take 1 tablet by mouth once daily    Disp: 90 tablet    Refills: 0    Start: 12/11/2023    Class: Eprescribe    Non-formulary For: Other specified hypothyroidism    Last ordered: 9 months ago (3/13/2023) by Giovani Garcia DO    Last refill: 3/13/2023    Rx #: [id number]    Thyroid Hormones Refill Protocol - 12 Month Protocol [id number]/11/2023 08:09 AM   Protocol Details Seen by prescribing provider or same department within the last 12 months or has a future appt in 3 months - IF FAILED PLEASE LOOK AT CHART REVIEW FOR LAST VISIT AND PROCEED ACCORDINGLY    Normal TSH within last 12 months looking at last value - IF FAILED REFER TO PROTOCOL DETAILS    Medication (including dose and sig) on current meds list        
on the discharge service for the patient. I have reviewed and made amendments to the documentation where necessary.

## 2025-06-10 NOTE — DISCHARGE NOTE NURSING/CASE MANAGEMENT/SOCIAL WORK - FINANCIAL ASSISTANCE
[facility] provides services at a reduced cost to those who are determined to be eligible through [facility]’s financial assistance program. Information regarding [facility]’s financial assistance program can be found by going to [email or web address] or by calling [phone].

## 2025-06-10 NOTE — DISCHARGE NOTE NURSING/CASE MANAGEMENT/SOCIAL WORK - PATIENT PORTAL LINK FT
You can access the FollowMyHealth Patient Portal offered by  by registering at the following website: [email or web address]. By joining [employer]’s FollowMyHealth portal, you will also be able to view your health information using other applications (apps) compatible with our system.

## 2025-06-10 NOTE — DISCHARGE NOTE PROVIDER - PROVIDER TOKENS
FREE:[LAST:[Primary Care Doctor],PHONE:[(   )    -],FAX:[(   )    -],ADDRESS:[[facility]],FOLLOWUP:[1 week]]

## 2025-06-10 NOTE — PROVIDER CONTACT NOTE (OTHER) - ACTION/TREATMENT ORDERED:
Pain medication given as per order. Patient laying comfortably in bed. Will retake vitals and continue to monitor patient.
provider notified
provider notified, no new orders at this time

## 2025-06-10 NOTE — DISCHARGE NOTE PROVIDER - CARE PROVIDER_API CALL
Primary Care Doctor,   Primary Care Clinic  Phone: (   )    -  Fax: (   )    -  Follow Up Time: 1 week

## 2025-06-12 LAB
CULTURE RESULTS: SIGNIFICANT CHANGE UP
SPECIMEN SOURCE: SIGNIFICANT CHANGE UP

## 2025-06-25 NOTE — PATIENT PROFILE ADULT - HEALTH LITERACY
Modify Regimen: Clobetasol 0.05% cream BID x 1 month
Render In Strict Bullet Format?: No
Detail Level: Zone
Discontinue Regimen: Triamcinolone 0.1% cream BID
no

## (undated) DEVICE — TUBING SUCTION 20FT

## (undated) DEVICE — DRAPE U 47X51" NON STERILE

## (undated) DEVICE — FOLEY HOLDER STATLOCK 2 WAY ADULT

## (undated) DEVICE — ADAPTER CHECK FLO 9FR STERILE

## (undated) DEVICE — VENODYNE/SCD SLEEVE CALF LARGE

## (undated) DEVICE — GOWN TRIMAX LG

## (undated) DEVICE — BOSTON SCIENTIFC ENCORE 26 INFLATOR

## (undated) DEVICE — SOL IRR BAG NS 0.9% 3000ML

## (undated) DEVICE — SPECIMEN CONTAINER 100ML

## (undated) DEVICE — SOL IRR POUR NS 0.9% 500ML

## (undated) DEVICE — DRSG TEGADERM 6"X8"

## (undated) DEVICE — DRAPE NEPHROSCOPY 72X118"

## (undated) DEVICE — NDL ACCESS NAVIGUIDE 18G 20CM

## (undated) DEVICE — POSITIONER CUSHION INSERT PRONE VIEW LG

## (undated) DEVICE — Device

## (undated) DEVICE — COVER NDL GUIDE

## (undated) DEVICE — POSITIONER FOAM EGG CRATE ULNAR 2PCS (PINK)

## (undated) DEVICE — GLV 7.5 PROTEXIS (WHITE)

## (undated) DEVICE — WARMING BLANKET UPPER ADULT

## (undated) DEVICE — NDL BIOPSY TROCAR TWO-PART 18G X 20CM

## (undated) DEVICE — PREP BETADINE KIT

## (undated) DEVICE — TUBING THERMADX UROLOGY

## (undated) DEVICE — IRR BULB PATHFINDER + 10"

## (undated) DEVICE — TUBING RANGER FLUID IRRIGATION SET DISP

## (undated) DEVICE — SOL IRR BAG H2O 3000ML

## (undated) DEVICE — DRAPE TOWEL BLUE 17" X 24"

## (undated) DEVICE — FOLEY CATH 2-WAY 18FR 5CC LATEX COUDE RED

## (undated) DEVICE — TUBING CONNECTING FOR DRAINAGE BAG MALE / FEMALE 14FR 30CM

## (undated) DEVICE — AMPLATZ RENAL DILATOR 6FR-30FR X 20CM

## (undated) DEVICE — POSITIONER FOAM HEADREST (PINK)

## (undated) DEVICE — SUT POLYSORB 2-0 18" V-20

## (undated) DEVICE — NDL 20CM TROCAR